# Patient Record
Sex: FEMALE | Race: BLACK OR AFRICAN AMERICAN | NOT HISPANIC OR LATINO
[De-identification: names, ages, dates, MRNs, and addresses within clinical notes are randomized per-mention and may not be internally consistent; named-entity substitution may affect disease eponyms.]

---

## 2020-11-04 PROBLEM — Z00.00 ENCOUNTER FOR PREVENTIVE HEALTH EXAMINATION: Status: ACTIVE | Noted: 2020-11-04

## 2020-11-20 ENCOUNTER — APPOINTMENT (OUTPATIENT)
Dept: VASCULAR SURGERY | Facility: CLINIC | Age: 70
End: 2020-11-20
Payer: MEDICARE

## 2020-11-20 ENCOUNTER — APPOINTMENT (OUTPATIENT)
Dept: VASCULAR SURGERY | Facility: CLINIC | Age: 70
End: 2020-11-20

## 2020-11-20 VITALS
BODY MASS INDEX: 40.48 KG/M2 | SYSTOLIC BLOOD PRESSURE: 170 MMHG | HEIGHT: 62 IN | WEIGHT: 220 LBS | HEART RATE: 90 BPM | DIASTOLIC BLOOD PRESSURE: 72 MMHG

## 2020-11-20 DIAGNOSIS — I10 ESSENTIAL (PRIMARY) HYPERTENSION: ICD-10-CM

## 2020-11-20 DIAGNOSIS — E11.9 TYPE 2 DIABETES MELLITUS W/OUT COMPLICATIONS: ICD-10-CM

## 2020-11-20 DIAGNOSIS — E04.2 NONTOXIC MULTINODULAR GOITER: ICD-10-CM

## 2020-11-20 DIAGNOSIS — E78.5 HYPERLIPIDEMIA, UNSPECIFIED: ICD-10-CM

## 2020-11-20 DIAGNOSIS — I50.9 HEART FAILURE, UNSPECIFIED: ICD-10-CM

## 2020-11-20 DIAGNOSIS — Z78.9 OTHER SPECIFIED HEALTH STATUS: ICD-10-CM

## 2020-11-20 PROCEDURE — 93970 EXTREMITY STUDY: CPT

## 2020-11-20 PROCEDURE — 99204 OFFICE O/P NEW MOD 45 MIN: CPT

## 2020-11-20 PROCEDURE — 93924 LWR XTR VASC STDY BILAT: CPT

## 2020-11-20 RX ORDER — LISINOPRIL 40 MG/1
40 TABLET ORAL
Refills: 0 | Status: ACTIVE | COMMUNITY

## 2020-11-20 RX ORDER — VITAMIN B COMPLEX
CAPSULE ORAL
Refills: 0 | Status: ACTIVE | COMMUNITY

## 2020-11-20 RX ORDER — HUMAN INSULIN 100 [IU]/ML
INJECTION, SUSPENSION SUBCUTANEOUS
Refills: 0 | Status: ACTIVE | COMMUNITY

## 2020-11-20 RX ORDER — ATORVASTATIN CALCIUM 40 MG/1
40 TABLET, FILM COATED ORAL
Refills: 0 | Status: ACTIVE | COMMUNITY

## 2020-11-20 RX ORDER — PSYLLIUM HUSK 0.4 G
CAPSULE ORAL
Refills: 0 | Status: ACTIVE | COMMUNITY

## 2020-11-20 NOTE — HISTORY OF PRESENT ILLNESS
[FreeTextEntry1] : 71 yo female with history of dm, htn, hld, chf, ckd not on hd presents for evaluation of bilateral lower extremity edema pain numbness and pain.  pt states that the pain has been present for a while but has significantly increased in severity over the past 3 months.  pt denies any history of open wounds or ulcers.  pt states that the right leg is more painful then the left.  pt states that after walking about 1/2 block her breathing stops her but her legs do feel stiff.  pt states that her dose of lasix was recently increased with improvement in the edema and pain.

## 2020-11-20 NOTE — CONSULT LETTER
[Dear  ___] : Dear  [unfilled], [Consult Letter:] : I had the pleasure of evaluating your patient, [unfilled]. [Please see my note below.] : Please see my note below. [Consult Closing:] : Thank you very much for allowing me to participate in the care of this patient.  If you have any questions, please do not hesitate to contact me. [Sincerely,] : Sincerely, [FreeTextEntry3] : Piyuhs Kidd M.D., F.YENYS., R.P.[email or web address].\par  of Vascular Surgery\par Assistant Professor of Radiology\par Director of Endovascular Program/ [facility]\par [facility]

## 2020-11-20 NOTE — PHYSICAL EXAM
[0] : right 0 [2+] : left 2+ [] : bilaterally [Ankle Swelling Bilaterally] : severe [No Rash or Lesion] : No rash or lesion [Alert] : alert [Calm] : calm [JVD] : no jugular venous distention  [Normal Breath Sounds] : Normal breath sounds [Normal Heart Sounds] : normal heart sounds [Ankle Swelling (On Exam)] : not present [Varicose Veins Of Lower Extremities] : not present [Abdomen Masses] : No abdominal masses [Skin Ulcer] : no ulcer [de-identified] : appears well  [de-identified] : right calf tenderness

## 2020-11-20 NOTE — ASSESSMENT
[FreeTextEntry1] : 69 yo female with history of dm, htn, hld, chf, ckd not on hd presents for evaluation of bilateral lower extremity edema pain numbness and pain. \par \par venous duplex shows no insufficiency of the right lower extremity, left lower extremity with reflux in the ssv from the saphenofemoral junction to the proximal calf \par \par farhana/pvr shows mild decrease in pressure of the right lower extremity with farhana of 0.8 with drop to 0.6 after exercise \par \par would recommend right lower extremity angiogram\par pt given request for blood work to check for most recent creatine and she will consult with her nephrologist \par pt encouraged to exercise/walk \par pt to start asa  \par given pt history of chf would not recommend pletal \par

## 2020-12-13 ENCOUNTER — APPOINTMENT (OUTPATIENT)
Dept: DISASTER EMERGENCY | Facility: CLINIC | Age: 70
End: 2020-12-13

## 2020-12-14 ENCOUNTER — LABORATORY RESULT (OUTPATIENT)
Age: 70
End: 2020-12-14

## 2020-12-14 LAB — SARS-COV-2 N GENE NPH QL NAA+PROBE: NOT DETECTED

## 2020-12-15 PROBLEM — I73.9 PAD (PERIPHERAL ARTERY DISEASE): Status: ACTIVE | Noted: 2020-11-20

## 2020-12-15 RX ORDER — INSULIN ISOPHANE,PORK PURE 100/ML
VIAL (ML) SUBCUTANEOUS
Refills: 0 | Status: ACTIVE | COMMUNITY

## 2020-12-15 RX ORDER — GABAPENTIN 300 MG/1
300 CAPSULE ORAL
Refills: 0 | Status: ACTIVE | COMMUNITY

## 2020-12-16 ENCOUNTER — APPOINTMENT (OUTPATIENT)
Dept: ENDOVASCULAR SURGERY | Facility: CLINIC | Age: 70
End: 2020-12-16
Payer: MEDICARE

## 2020-12-16 ENCOUNTER — LABORATORY RESULT (OUTPATIENT)
Age: 70
End: 2020-12-16

## 2020-12-16 ENCOUNTER — RESULT REVIEW (OUTPATIENT)
Age: 70
End: 2020-12-16

## 2020-12-16 VITALS
DIASTOLIC BLOOD PRESSURE: 97 MMHG | RESPIRATION RATE: 16 BRPM | OXYGEN SATURATION: 98 % | SYSTOLIC BLOOD PRESSURE: 185 MMHG | HEART RATE: 74 BPM | TEMPERATURE: 98 F | WEIGHT: 220 LBS | BODY MASS INDEX: 40.48 KG/M2 | HEIGHT: 62 IN

## 2020-12-16 DIAGNOSIS — I73.9 PERIPHERAL VASCULAR DISEASE, UNSPECIFIED: ICD-10-CM

## 2020-12-16 PROCEDURE — 99072 ADDL SUPL MATRL&STAF TM PHE: CPT

## 2020-12-16 PROCEDURE — 37229Z: CUSTOM | Mod: RT

## 2020-12-16 PROCEDURE — 75625 CONTRAST EXAM ABDOMINL AORTA: CPT

## 2020-12-16 RX ORDER — ASPIRIN ENTERIC COATED TABLETS 81 MG 81 MG/1
81 TABLET, DELAYED RELEASE ORAL DAILY
Qty: 90 | Refills: 3 | Status: ACTIVE | COMMUNITY
Start: 2020-12-16 | End: 1900-01-01

## 2020-12-22 NOTE — PAST MEDICAL HISTORY
[Increasing age ( >40 years old)] : Increasing age ( >40 years old) [Congestive Heart Failure] : Congestive Heart Failure [No therapy indicated for cases scheduled for less than one hour] : No therapy indicated for cases scheduled for less than one hour. [Obesity: BMI >25] : Obesity: BMI >25 [FreeTextEntry1] : Malignant Hyperthermia Screening Tool and Risk of Bleeding Assessment\par \par Ms. YON WARREN denies family history of unexpected death following Anesthesia or Exercise.\par Denies Family history of Malignant Hyperthermia, Muscle or Neuromuscular disorder and High Temperature following exercise.\par \par Ms. YON WARREN denies history of Muscle Spasm, Dark or Chocolate - Colored urine and Unanticipated fever immediately following anesthesia or serious exercise. \par Ms. WARREN also denies bleeding tendencies/ Risks of Bleeding.\par

## 2020-12-22 NOTE — PROCEDURE
[FSBS] : FSBS [D/C IV on discharge] : D/C IV on discharge [Resume diet] : resume diet [FreeTextEntry1] : aortogram, right leg angiogram/atherectomy/angioplasty

## 2020-12-22 NOTE — HISTORY OF PRESENT ILLNESS
[FreeTextEntry1] : accompanied by daughter Tootie Estevez [phone]\par covid not detected 12/13/2020\par Cr 2.03 12/14/2020\par BS: 250, took 10 units of novolin at 430 am.  @700am\par feels ok\par took lisinopril [FreeTextEntry5] : 12/15/2020 9pm [FreeTextEntry6] : Dr. Sparks

## 2020-12-22 NOTE — PHYSICAL EXAM
[Normal Breath Sounds] : Normal breath sounds [Normal Heart Sounds] : normal heart sounds [0] : right 0 [2+] : left 2+ [] : bilaterally [Ankle Swelling Bilaterally] : severe [No Rash or Lesion] : No rash or lesion [Alert] : alert [Calm] : calm [JVD] : no jugular venous distention  [Ankle Swelling (On Exam)] : not present [Varicose Veins Of Lower Extremities] : not present [Abdomen Masses] : No abdominal masses [Skin Ulcer] : no ulcer [de-identified] : appears well  [de-identified] : right calf tenderness

## 2021-01-08 ENCOUNTER — APPOINTMENT (OUTPATIENT)
Dept: VASCULAR SURGERY | Facility: CLINIC | Age: 71
End: 2021-01-08
Payer: MEDICARE

## 2021-01-08 VITALS — TEMPERATURE: 96.8 F

## 2021-01-08 PROCEDURE — 99213 OFFICE O/P EST LOW 20 MIN: CPT

## 2021-01-08 PROCEDURE — 93926 LOWER EXTREMITY STUDY: CPT

## 2021-01-08 PROCEDURE — 99072 ADDL SUPL MATRL&STAF TM PHE: CPT

## 2021-01-08 PROCEDURE — 93923 UPR/LXTR ART STDY 3+ LVLS: CPT

## 2021-01-08 NOTE — PHYSICAL EXAM
Currently asymptomatic  [JVD] : no jugular venous distention  [Normal Breath Sounds] : Normal breath sounds [Normal Heart Sounds] : normal heart sounds [2+] : left 2+ [Ankle Swelling (On Exam)] : not present [Varicose Veins Of Lower Extremities] : not present [] : bilaterally [Ankle Swelling Bilaterally] : severe [Abdomen Masses] : No abdominal masses [No Rash or Lesion] : No rash or lesion [Skin Ulcer] : no ulcer [Alert] : alert [Oriented to Person] : oriented to person [Oriented to Place] : oriented to place [Calm] : calm [de-identified] : appears well  [de-identified] : right calf tenderness

## 2021-01-08 NOTE — ASSESSMENT
[FreeTextEntry1] : Patient with peripheral vascular disease, status post right leg intervention.  Patient's symptoms have improved.  Arterial Dopplers have improved.  Arterial duplex of the right anterior tibial artery shows some residual stenosis.  Recommend antiplatelet therapy.  Follow-up in 3 months with repeat duplex.

## 2021-01-08 NOTE — HISTORY OF PRESENT ILLNESS
[FreeTextEntry1] : Patient is a 70-year-old with complaints of right foot pain and claudication with skin discoloration who underwent right leg angiogram and right lower extremity revascularization endovascularly.  Patient reports improvement of symptoms significantly.  No significant rest pain with mild claudication symptoms.  Patient has remained noncompliant with antiplatelet therapy.

## 2021-04-16 ENCOUNTER — APPOINTMENT (OUTPATIENT)
Dept: VASCULAR SURGERY | Facility: CLINIC | Age: 71
End: 2021-04-16
Payer: MEDICARE

## 2021-04-16 VITALS
DIASTOLIC BLOOD PRESSURE: 74 MMHG | SYSTOLIC BLOOD PRESSURE: 126 MMHG | WEIGHT: 220 LBS | HEIGHT: 62 IN | BODY MASS INDEX: 40.48 KG/M2 | HEART RATE: 66 BPM

## 2021-04-16 VITALS — TEMPERATURE: 96.7 F

## 2021-04-16 PROCEDURE — 99213 OFFICE O/P EST LOW 20 MIN: CPT

## 2021-04-16 PROCEDURE — 93926 LOWER EXTREMITY STUDY: CPT

## 2021-04-16 PROCEDURE — 99072 ADDL SUPL MATRL&STAF TM PHE: CPT

## 2021-04-16 NOTE — HISTORY OF PRESENT ILLNESS
[FreeTextEntry1] : Patient is a 70-year-old with complaints of right foot pain and claudication with skin discoloration who underwent right leg angiogram and right lower extremity revascularization endovascularly.  Patient reports improvement of symptoms significantly.  No significant rest pain with mild claudication symptoms.

## 2021-04-16 NOTE — ASSESSMENT
[FreeTextEntry1] : Patient with peripheral vascular disease, status post right leg intervention.  Patient's symptoms have improved.   Arterial duplex of the right anterior tibial artery shows some residual stenosis.  Recommend antiplatelet therapy.  Follow-up in 3 months with repeat duplex and PVRs.

## 2021-04-16 NOTE — PHYSICAL EXAM
[JVD] : no jugular venous distention  [Normal Breath Sounds] : Normal breath sounds [Normal Heart Sounds] : normal heart sounds [2+] : left 2+ [Ankle Swelling (On Exam)] : not present [Varicose Veins Of Lower Extremities] : not present [] : bilaterally [Ankle Swelling Bilaterally] : severe [Abdomen Masses] : No abdominal masses [No Rash or Lesion] : No rash or lesion [Skin Ulcer] : no ulcer [Alert] : alert [Oriented to Person] : oriented to person [Oriented to Place] : oriented to place [Calm] : calm [de-identified] : appears well  [de-identified] : right calf tenderness

## 2021-04-29 ENCOUNTER — INPATIENT (INPATIENT)
Facility: HOSPITAL | Age: 71
LOS: 22 days | Discharge: HOME HEALTH | End: 2021-05-22
Attending: HOSPITALIST | Admitting: HOSPITALIST
Payer: MEDICARE

## 2021-04-29 VITALS
TEMPERATURE: 98 F | SYSTOLIC BLOOD PRESSURE: 169 MMHG | DIASTOLIC BLOOD PRESSURE: 82 MMHG | OXYGEN SATURATION: 95 % | RESPIRATION RATE: 24 BRPM | HEART RATE: 74 BPM

## 2021-04-29 DIAGNOSIS — I10 ESSENTIAL (PRIMARY) HYPERTENSION: ICD-10-CM

## 2021-04-29 DIAGNOSIS — I50.9 HEART FAILURE, UNSPECIFIED: ICD-10-CM

## 2021-04-29 DIAGNOSIS — C34.90 MALIGNANT NEOPLASM OF UNSPECIFIED PART OF UNSPECIFIED BRONCHUS OR LUNG: Chronic | ICD-10-CM

## 2021-04-29 DIAGNOSIS — G62.9 POLYNEUROPATHY, UNSPECIFIED: ICD-10-CM

## 2021-04-29 DIAGNOSIS — N18.9 CHRONIC KIDNEY DISEASE, UNSPECIFIED: ICD-10-CM

## 2021-04-29 DIAGNOSIS — Z29.9 ENCOUNTER FOR PROPHYLACTIC MEASURES, UNSPECIFIED: ICD-10-CM

## 2021-04-29 DIAGNOSIS — R06.00 DYSPNEA, UNSPECIFIED: ICD-10-CM

## 2021-04-29 DIAGNOSIS — I73.9 PERIPHERAL VASCULAR DISEASE, UNSPECIFIED: ICD-10-CM

## 2021-04-29 DIAGNOSIS — Z98.890 OTHER SPECIFIED POSTPROCEDURAL STATES: Chronic | ICD-10-CM

## 2021-04-29 DIAGNOSIS — F10.10 ALCOHOL ABUSE, UNCOMPLICATED: ICD-10-CM

## 2021-04-29 DIAGNOSIS — N28.9 DISORDER OF KIDNEY AND URETER, UNSPECIFIED: ICD-10-CM

## 2021-04-29 DIAGNOSIS — E78.5 HYPERLIPIDEMIA, UNSPECIFIED: ICD-10-CM

## 2021-04-29 DIAGNOSIS — Z90.710 ACQUIRED ABSENCE OF BOTH CERVIX AND UTERUS: Chronic | ICD-10-CM

## 2021-04-29 DIAGNOSIS — E11.9 TYPE 2 DIABETES MELLITUS WITHOUT COMPLICATIONS: ICD-10-CM

## 2021-04-29 DIAGNOSIS — H53.2 DIPLOPIA: ICD-10-CM

## 2021-04-29 DIAGNOSIS — Z98.62 PERIPHERAL VASCULAR ANGIOPLASTY STATUS: Chronic | ICD-10-CM

## 2021-04-29 DIAGNOSIS — R07.9 CHEST PAIN, UNSPECIFIED: ICD-10-CM

## 2021-04-29 DIAGNOSIS — Z98.49 CATARACT EXTRACTION STATUS, UNSPECIFIED EYE: Chronic | ICD-10-CM

## 2021-04-29 DIAGNOSIS — R13.10 DYSPHAGIA, UNSPECIFIED: ICD-10-CM

## 2021-04-29 DIAGNOSIS — E11.42 TYPE 2 DIABETES MELLITUS WITH DIABETIC POLYNEUROPATHY: ICD-10-CM

## 2021-04-29 LAB
ALBUMIN SERPL ELPH-MCNC: 4.2 G/DL — SIGNIFICANT CHANGE UP (ref 3.3–5)
ALP SERPL-CCNC: 122 U/L — HIGH (ref 40–120)
ALT FLD-CCNC: 25 U/L — SIGNIFICANT CHANGE UP (ref 4–33)
ANION GAP SERPL CALC-SCNC: 10 MMOL/L — SIGNIFICANT CHANGE UP (ref 7–14)
AST SERPL-CCNC: 23 U/L — SIGNIFICANT CHANGE UP (ref 4–32)
BASOPHILS # BLD AUTO: 0.01 K/UL — SIGNIFICANT CHANGE UP (ref 0–0.2)
BASOPHILS NFR BLD AUTO: 0.2 % — SIGNIFICANT CHANGE UP (ref 0–2)
BILIRUB SERPL-MCNC: 0.4 MG/DL — SIGNIFICANT CHANGE UP (ref 0.2–1.2)
BUN SERPL-MCNC: 44 MG/DL — HIGH (ref 7–23)
CALCIUM SERPL-MCNC: 10.6 MG/DL — HIGH (ref 8.4–10.5)
CHLORIDE SERPL-SCNC: 105 MMOL/L — SIGNIFICANT CHANGE UP (ref 98–107)
CK MB BLD-MCNC: 1.8 % — SIGNIFICANT CHANGE UP (ref 0–2.5)
CK MB BLD-MCNC: 1.9 % — SIGNIFICANT CHANGE UP (ref 0–2.5)
CK MB CFR SERPL CALC: 2.9 NG/ML — SIGNIFICANT CHANGE UP
CK MB CFR SERPL CALC: 3 NG/ML — SIGNIFICANT CHANGE UP
CK SERPL-CCNC: 161 U/L — SIGNIFICANT CHANGE UP (ref 25–170)
CK SERPL-CCNC: 162 U/L — SIGNIFICANT CHANGE UP (ref 25–170)
CO2 SERPL-SCNC: 26 MMOL/L — SIGNIFICANT CHANGE UP (ref 22–31)
CREAT SERPL-MCNC: 2.41 MG/DL — HIGH (ref 0.5–1.3)
EOSINOPHIL # BLD AUTO: 0.09 K/UL — SIGNIFICANT CHANGE UP (ref 0–0.5)
EOSINOPHIL NFR BLD AUTO: 1.9 % — SIGNIFICANT CHANGE UP (ref 0–6)
GLUCOSE BLDC GLUCOMTR-MCNC: 140 MG/DL — HIGH (ref 70–99)
GLUCOSE BLDC GLUCOMTR-MCNC: 140 MG/DL — HIGH (ref 70–99)
GLUCOSE BLDC GLUCOMTR-MCNC: 87 MG/DL — SIGNIFICANT CHANGE UP (ref 70–99)
GLUCOSE SERPL-MCNC: 74 MG/DL — SIGNIFICANT CHANGE UP (ref 70–99)
HCT VFR BLD CALC: 34.8 % — SIGNIFICANT CHANGE UP (ref 34.5–45)
HGB BLD-MCNC: 10.9 G/DL — LOW (ref 11.5–15.5)
IANC: 2.74 K/UL — SIGNIFICANT CHANGE UP (ref 1.5–8.5)
IMM GRANULOCYTES NFR BLD AUTO: 0.2 % — SIGNIFICANT CHANGE UP (ref 0–1.5)
LYMPHOCYTES # BLD AUTO: 1.25 K/UL — SIGNIFICANT CHANGE UP (ref 1–3.3)
LYMPHOCYTES # BLD AUTO: 26.6 % — SIGNIFICANT CHANGE UP (ref 13–44)
MAGNESIUM SERPL-MCNC: 2.5 MG/DL — SIGNIFICANT CHANGE UP (ref 1.6–2.6)
MCHC RBC-ENTMCNC: 28.7 PG — SIGNIFICANT CHANGE UP (ref 27–34)
MCHC RBC-ENTMCNC: 31.3 GM/DL — LOW (ref 32–36)
MCV RBC AUTO: 91.6 FL — SIGNIFICANT CHANGE UP (ref 80–100)
MONOCYTES # BLD AUTO: 0.6 K/UL — SIGNIFICANT CHANGE UP (ref 0–0.9)
MONOCYTES NFR BLD AUTO: 12.8 % — SIGNIFICANT CHANGE UP (ref 2–14)
NEUTROPHILS # BLD AUTO: 2.74 K/UL — SIGNIFICANT CHANGE UP (ref 1.8–7.4)
NEUTROPHILS NFR BLD AUTO: 58.3 % — SIGNIFICANT CHANGE UP (ref 43–77)
NRBC # BLD: 0 /100 WBCS — SIGNIFICANT CHANGE UP
NRBC # FLD: 0 K/UL — SIGNIFICANT CHANGE UP
NT-PROBNP SERPL-SCNC: 295 PG/ML — SIGNIFICANT CHANGE UP
PHOSPHATE SERPL-MCNC: 3.4 MG/DL — SIGNIFICANT CHANGE UP (ref 2.5–4.5)
PLATELET # BLD AUTO: 198 K/UL — SIGNIFICANT CHANGE UP (ref 150–400)
POTASSIUM SERPL-MCNC: 4.8 MMOL/L — SIGNIFICANT CHANGE UP (ref 3.5–5.3)
POTASSIUM SERPL-SCNC: 4.8 MMOL/L — SIGNIFICANT CHANGE UP (ref 3.5–5.3)
PROT SERPL-MCNC: 7.3 G/DL — SIGNIFICANT CHANGE UP (ref 6–8.3)
RBC # BLD: 3.8 M/UL — SIGNIFICANT CHANGE UP (ref 3.8–5.2)
RBC # FLD: 15 % — HIGH (ref 10.3–14.5)
SARS-COV-2 RNA SPEC QL NAA+PROBE: SIGNIFICANT CHANGE UP
SODIUM SERPL-SCNC: 141 MMOL/L — SIGNIFICANT CHANGE UP (ref 135–145)
TROPONIN T, HIGH SENSITIVITY RESULT: 64 NG/L — CRITICAL HIGH
TROPONIN T, HIGH SENSITIVITY RESULT: 66 NG/L — CRITICAL HIGH
WBC # BLD: 4.7 K/UL — SIGNIFICANT CHANGE UP (ref 3.8–10.5)
WBC # FLD AUTO: 4.7 K/UL — SIGNIFICANT CHANGE UP (ref 3.8–10.5)

## 2021-04-29 PROCEDURE — 71045 X-RAY EXAM CHEST 1 VIEW: CPT | Mod: 26

## 2021-04-29 PROCEDURE — 99223 1ST HOSP IP/OBS HIGH 75: CPT

## 2021-04-29 PROCEDURE — 99285 EMERGENCY DEPT VISIT HI MDM: CPT | Mod: 25

## 2021-04-29 PROCEDURE — 93010 ELECTROCARDIOGRAM REPORT: CPT

## 2021-04-29 RX ORDER — ASPIRIN/CALCIUM CARB/MAGNESIUM 324 MG
324 TABLET ORAL ONCE
Refills: 0 | Status: COMPLETED | OUTPATIENT
Start: 2021-04-29 | End: 2021-04-29

## 2021-04-29 RX ORDER — FUROSEMIDE 40 MG
40 TABLET ORAL ONCE
Refills: 0 | Status: COMPLETED | OUTPATIENT
Start: 2021-04-29 | End: 2021-04-29

## 2021-04-29 RX ORDER — GABAPENTIN 400 MG/1
300 CAPSULE ORAL THREE TIMES A DAY
Refills: 0 | Status: DISCONTINUED | OUTPATIENT
Start: 2021-04-29 | End: 2021-05-22

## 2021-04-29 RX ORDER — KETOTIFEN FUMARATE 0.34 MG/ML
1 SOLUTION OPHTHALMIC
Refills: 0 | Status: DISCONTINUED | OUTPATIENT
Start: 2021-04-29 | End: 2021-05-22

## 2021-04-29 RX ORDER — INSULIN LISPRO 100/ML
5 VIAL (ML) SUBCUTANEOUS
Refills: 0 | Status: DISCONTINUED | OUTPATIENT
Start: 2021-04-29 | End: 2021-04-29

## 2021-04-29 RX ORDER — DEXTROSE 50 % IN WATER 50 %
25 SYRINGE (ML) INTRAVENOUS ONCE
Refills: 0 | Status: DISCONTINUED | OUTPATIENT
Start: 2021-04-29 | End: 2021-05-22

## 2021-04-29 RX ORDER — ALLOPURINOL 300 MG
100 TABLET ORAL DAILY
Refills: 0 | Status: DISCONTINUED | OUTPATIENT
Start: 2021-04-29 | End: 2021-05-22

## 2021-04-29 RX ORDER — KETOTIFEN FUMARATE 0.34 MG/ML
1 SOLUTION OPHTHALMIC
Refills: 0 | Status: DISCONTINUED | OUTPATIENT
Start: 2021-04-29 | End: 2021-04-29

## 2021-04-29 RX ORDER — SODIUM CHLORIDE 9 MG/ML
1000 INJECTION, SOLUTION INTRAVENOUS
Refills: 0 | Status: DISCONTINUED | OUTPATIENT
Start: 2021-04-29 | End: 2021-05-22

## 2021-04-29 RX ORDER — HYDRALAZINE HCL 50 MG
100 TABLET ORAL THREE TIMES A DAY
Refills: 0 | Status: DISCONTINUED | OUTPATIENT
Start: 2021-04-29 | End: 2021-05-04

## 2021-04-29 RX ORDER — GLUCAGON INJECTION, SOLUTION 0.5 MG/.1ML
1 INJECTION, SOLUTION SUBCUTANEOUS ONCE
Refills: 0 | Status: DISCONTINUED | OUTPATIENT
Start: 2021-04-29 | End: 2021-05-22

## 2021-04-29 RX ORDER — INSULIN LISPRO 100/ML
VIAL (ML) SUBCUTANEOUS AT BEDTIME
Refills: 0 | Status: DISCONTINUED | OUTPATIENT
Start: 2021-04-29 | End: 2021-05-10

## 2021-04-29 RX ORDER — DEXTROSE 50 % IN WATER 50 %
12.5 SYRINGE (ML) INTRAVENOUS ONCE
Refills: 0 | Status: DISCONTINUED | OUTPATIENT
Start: 2021-04-29 | End: 2021-05-22

## 2021-04-29 RX ORDER — ACETAMINOPHEN 500 MG
650 TABLET ORAL EVERY 6 HOURS
Refills: 0 | Status: DISCONTINUED | OUTPATIENT
Start: 2021-04-29 | End: 2021-05-22

## 2021-04-29 RX ORDER — HEPARIN SODIUM 5000 [USP'U]/ML
5000 INJECTION INTRAVENOUS; SUBCUTANEOUS EVERY 8 HOURS
Refills: 0 | Status: DISCONTINUED | OUTPATIENT
Start: 2021-04-29 | End: 2021-05-20

## 2021-04-29 RX ORDER — INSULIN LISPRO 100/ML
VIAL (ML) SUBCUTANEOUS
Refills: 0 | Status: DISCONTINUED | OUTPATIENT
Start: 2021-04-29 | End: 2021-05-10

## 2021-04-29 RX ORDER — CARVEDILOL PHOSPHATE 80 MG/1
6.25 CAPSULE, EXTENDED RELEASE ORAL EVERY 12 HOURS
Refills: 0 | Status: DISCONTINUED | OUTPATIENT
Start: 2021-04-29 | End: 2021-05-04

## 2021-04-29 RX ORDER — HUMAN INSULIN 100 [IU]/ML
25 INJECTION, SUSPENSION SUBCUTANEOUS
Refills: 0 | Status: DISCONTINUED | OUTPATIENT
Start: 2021-04-29 | End: 2021-04-29

## 2021-04-29 RX ORDER — DEXTROSE 50 % IN WATER 50 %
15 SYRINGE (ML) INTRAVENOUS ONCE
Refills: 0 | Status: DISCONTINUED | OUTPATIENT
Start: 2021-04-29 | End: 2021-05-22

## 2021-04-29 RX ORDER — HUMAN INSULIN 100 [IU]/ML
20 INJECTION, SUSPENSION SUBCUTANEOUS
Refills: 0 | Status: DISCONTINUED | OUTPATIENT
Start: 2021-04-29 | End: 2021-05-07

## 2021-04-29 RX ORDER — FUROSEMIDE 40 MG
40 TABLET ORAL
Refills: 0 | Status: DISCONTINUED | OUTPATIENT
Start: 2021-04-29 | End: 2021-04-30

## 2021-04-29 RX ORDER — LISINOPRIL 2.5 MG/1
40 TABLET ORAL DAILY
Refills: 0 | Status: DISCONTINUED | OUTPATIENT
Start: 2021-04-29 | End: 2021-04-29

## 2021-04-29 RX ORDER — ATORVASTATIN CALCIUM 80 MG/1
80 TABLET, FILM COATED ORAL AT BEDTIME
Refills: 0 | Status: DISCONTINUED | OUTPATIENT
Start: 2021-04-29 | End: 2021-05-22

## 2021-04-29 RX ORDER — LISINOPRIL 2.5 MG/1
1 TABLET ORAL
Qty: 0 | Refills: 0 | DISCHARGE

## 2021-04-29 RX ORDER — ASPIRIN/CALCIUM CARB/MAGNESIUM 324 MG
81 TABLET ORAL DAILY
Refills: 0 | Status: DISCONTINUED | OUTPATIENT
Start: 2021-04-29 | End: 2021-05-22

## 2021-04-29 RX ADMIN — Medication 324 MILLIGRAM(S): at 11:28

## 2021-04-29 RX ADMIN — GABAPENTIN 300 MILLIGRAM(S): 400 CAPSULE ORAL at 22:46

## 2021-04-29 RX ADMIN — ATORVASTATIN CALCIUM 80 MILLIGRAM(S): 80 TABLET, FILM COATED ORAL at 22:47

## 2021-04-29 RX ADMIN — Medication 1 TABLET(S): at 17:39

## 2021-04-29 RX ADMIN — Medication 100 MILLIGRAM(S): at 19:59

## 2021-04-29 RX ADMIN — LISINOPRIL 40 MILLIGRAM(S): 2.5 TABLET ORAL at 19:59

## 2021-04-29 RX ADMIN — Medication 40 MILLIGRAM(S): at 17:40

## 2021-04-29 RX ADMIN — HEPARIN SODIUM 5000 UNIT(S): 5000 INJECTION INTRAVENOUS; SUBCUTANEOUS at 22:46

## 2021-04-29 RX ADMIN — Medication 40 MILLIGRAM(S): at 10:12

## 2021-04-29 RX ADMIN — Medication 81 MILLIGRAM(S): at 17:38

## 2021-04-29 RX ADMIN — CARVEDILOL PHOSPHATE 6.25 MILLIGRAM(S): 80 CAPSULE, EXTENDED RELEASE ORAL at 17:38

## 2021-04-29 NOTE — H&P ADULT - NSICDXPASTMEDICALHX_GEN_ALL_CORE_FT
PAST MEDICAL HISTORY:  CHF (congestive heart failure)     CKD (chronic kidney disease)     Diabetes     HLD (hyperlipidemia)     HTN (hypertension)     Peripheral neuropathy     PVD (peripheral vascular disease)

## 2021-04-29 NOTE — H&P ADULT - GASTROINTESTINAL DETAILS
soft/nontender/no rebound tenderness/no guarding soft/nontender/no rebound tenderness/no guarding/no rigidity

## 2021-04-29 NOTE — H&P ADULT - PROBLEM SELECTOR PLAN 3
monitor bun/cr, avoid nephrotoxic agents, consider renal consult - Patient reports worsening renal function as outpatient but unsure on function/level (states it was in the 2s, now 1s but not sure if that is the creatinine level or the function level)  - Here with elevated BUN/Cr placing her at CKD4  - Would monitor BUN/Cr, check UA, urine sodium/creatinine  - Check renal US  - Consider renal eval in AM

## 2021-04-29 NOTE — ED PROVIDER NOTE - PMH
CHF (congestive heart failure)    CKD (chronic kidney disease)    Diabetes    HLD (hyperlipidemia)    HTN (hypertension)    PVD (peripheral vascular disease)

## 2021-04-29 NOTE — H&P ADULT - NEGATIVE ENMT SYMPTOMS
no vertigo/no sinus symptoms/no nasal congestion/no nasal discharge/no nasal obstruction no vertigo/no sinus symptoms/no nasal congestion/no nasal discharge/no nasal obstruction/no throat pain

## 2021-04-29 NOTE — ED PROVIDER NOTE - NS ED ROS FT
Constitutional: no fevers; no chills  HEENT: no visual changes, no sore throat, no rhinorrhea  CV: cp; no palpitations  Resp: sob; no cough  GI: no abd pain, no nausea, no vomiting, no diarrhea, no constipation  : no dysuria, no hematuria  MSK: no myalgais; no arthralgias  skin: no rashes  neuro: no HA, no numbness; no weakness, no tingling  ROS statement: all other ROS negative except as per HPI

## 2021-04-29 NOTE — H&P ADULT - MUSCULOSKELETAL
normal/ROM intact/no calf tenderness/normal strength detailed exam details… ROM intact/no joint swelling/no calf tenderness/normal strength

## 2021-04-29 NOTE — H&P ADULT - PROBLEM SELECTOR PLAN 5
monitor bp, cont meds, adjust as needed - c/o new onset dysphagia, noted over the past few weeks, mostly to large pills, occasionally to food  - here passed dysphagia screen, will place on dysphagia 3 diet with honey thick liquids, obtain S&S eval in AM  - Check Thyroid US to eval her thyroid nodules, if present then might need further evaluation

## 2021-04-29 NOTE — H&P ADULT - PROBLEM SELECTOR PROBLEM 7
Need for prophylactic measure Type 2 diabetes mellitus with diabetic polyneuropathy, with long-term current use of insulin

## 2021-04-29 NOTE — H&P ADULT - NSICDXPASTSURGICALHX_GEN_ALL_CORE_FT
PAST SURGICAL HISTORY:  History of angioplasty of peripheral vessel RLE - no stents    History of breast biopsy bilateral - benign    History of cataract surgery bilateral eyes    History of hysterectomy     S/P thyroid biopsy benign

## 2021-04-29 NOTE — H&P ADULT - NEGATIVE GASTROINTESTINAL SYMPTOMS
no nausea/no vomiting/no change in bowel habits/no abdominal pain no nausea/no vomiting/no diarrhea/no change in bowel habits/no abdominal pain

## 2021-04-29 NOTE — H&P ADULT - PROBLEM SELECTOR PLAN 1
ekg/telemetry, ce x 2, mg, tsh, echo, consider cardio c/s, lasix 40 iv bid, daily wts, fluid restriction, strict I & O's, bnp - Patient with worsening SOB, orthopnea, b/l LE edema, weight gain, increasing abdominal girth, here with bibasilar crackles and mildly elevated JVD concerning for CHF exacerbation despite low proBNP (might be falsely low 2/2 BMI)  - Will c/w IV lasix diuresis BID, check TTE, telemetry monitoring  - 1.5L fluid restriction, daily weights, I/O   - Recheck proBNP in AM, trend trops (likely elevated 2/2 CKD and demand from CHF exacerbation)  - Cardiology eval in AM as per primary team  - Will check b/l LE duplex scan

## 2021-04-29 NOTE — ED PROVIDER NOTE - ATTENDING CONTRIBUTION TO CARE
Pt was seen and evaluated by me. Pt is a 70 y/o female with PMHx CKD, DM type 2, HLD, HTN, PVD, and CHF who presented to the ED for increased SOB X 5 days. Pt states over the past 5 days having increased SOB with lying down and improved with sitting up. Pt was recently changed from Lasix 40mg to Torsemide 20mg. Pt also admits to some chest discomfort. Pt denies any fever, chills, nausea, vomiting, or abd pain. Rales at b/l bases. RRR. Abd soft, non-tender. Noted to have 2+ pitting edema to b/l LE.   Concern for CHF/ACS  Labs, EKG, CXR, Lasix

## 2021-04-29 NOTE — H&P ADULT - HISTORY OF PRESENT ILLNESS
72 y/o female, with a PmHx of HTN, DM, CHF, CKD, HLD. PAD s/p revascularization, presented to the [facility] ED with sob. Pt states she has been having worsening sob over the past 2 days since she was taken off the Torsemide. Her Cardiologist had taken her off the Torsemide for a few days as recommended by her Nephrologist due to a worsening renal function but since coming off of the Torsemide, she has gradually gotten more sob. She states she is having difficulty ambulating a few feet without getting sob and has had worsening pain in bilateral LE's due to increased edema. Denies any fever, chills, HA, dizziness, abd pain, n/v, recent travel. She did have some chest pain this morning to the left sided of her chest. She described the chest pain as a dull, left sided, non-radiating, 4/10 pain but has since subsided and is now a 0/10 since she has been at [facility]. She was also c/o mild blurry vision that has gradually getting worse over the past few weeks. She states she currently feels a little better and is now being admitted to telemetry for acute on chronic systolic heart failure exacerbation.   This is a 71F with history of DM2 with neuropathy, HTN, Recently diagnosed CKD, PAD s/p angiogram RLE (pt denies any stents, just "clearing out"), CKD, HLD, and Thyroid nodules (benign biopsy x2 as per patient) who presents to the hospital with complaints of worsening SOB, LE edema, orthopnea, weight gain, and increasing abdominal girth. Also with complaints of new intermittent double vision (occasionally when watching TV, does not occur otherwise) and new dysphagia intermittently.     Patient said that she initially noted worsening LE edema a few months ago and had a work up done for it and was told she had CHF (not sure on the etiology of her CHF). Was placed on furosemide by her nephrologist and had the medication changed to torsemide by her cardiologist. Initially was taking it twice daily but then her renal function started to worsen and then her cardiologist decreased the torsemide to daily then every other day. During this time she also noted that her SOB started to worsen and that she was gaining weight (~13 pounds over the past month 215 -> 228), had worsening LE edema, increasing abdominal girth, and worsening orthopnea (now sleeps on 4 pillows). Her cardiologist had performed a stress test and an echo on her recently also as work up of her issues. (pt does not know the results). Today, while she was getting ready she noted mild, 4/10 L chest pressure like pain and had to rest for the pain to resolve. Thus came to the hospital for evaluation. Currently said that her symptoms seem improved after the medications in the ED, denies any current chest pain while in the hospital, still has SOB with exertion though.    Of note, states that she has noted occasional double vision when watching TV, does not notice the double vision with any other activities. Denies any HA with the double vision, no blurry vision, no other eye complaints. Also has noted intermittent dysphagia recently, mostly to large pills and occasionally to some food/liquids. Started about a month ago, states that she has a history of thyroid nodules and is worried that they maybe growing and causing her symptoms currently. Also has EtOH use disorder, said that she drinks a pint of vodka over the weekend every week for a long time. Has never withdrawn from EtOH use and denies any hospitalizations for EtOH use. has been to rehab x2 for EtOH use.    Denies any other complaints.     On arrival to the ED, her vitals were T 97.8, P 74, /72, RR 24, O2 sat 95% RA. Her lab work showed anemia (no prior baseline), elevated trops but stable (possibly 2/2 renal disease, CK/CKMB wnl), and elevated BUN/Cr. Her CXR showed clear lungs. She was given aspirin 324mg PO x1 and lasix 40mg IVP x1. She was admitted to medicine on telemetry.  This is a 71F with history of DM2 with neuropathy, HTN, Recently diagnosed CKD, PAD s/p angiogram RLE (pt denies any stents, just "clearing out"), CKD, HLD, and Thyroid nodules (benign biopsy x2 as per patient) who presents to the hospital with complaints of worsening SOB, LE edema, orthopnea, weight gain, and increasing abdominal girth. Also with complaints of new intermittent double vision (occasionally when watching TV, does not occur otherwise) and new dysphagia intermittently.     Patient said that she initially noted worsening LE edema a few months ago and had a work up done for it and was told she had CHF (not sure on the etiology of her CHF). Was placed on furosemide by her nephrologist and had the medication changed to torsemide by her cardiologist. Initially was taking it twice daily but then her renal function started to worsen and then her cardiologist decreased the torsemide to daily then every other day. Cardiologist also stopped her lisinopril due to her worsening renal function and placed her on amlodipine (but she has not started the medication yet). During this time she also noted that her SOB started to worsen and that she was gaining weight (~13 pounds over the past month 215 -> 228), had worsening LE edema, increasing abdominal girth, and worsening orthopnea (now sleeps on 4 pillows). Her cardiologist had performed a stress test and an echo on her recently also as work up of her issues. (pt does not know the results). Today, while she was getting ready she noted mild, 4/10 L chest pressure like pain and had to rest for the pain to resolve. Thus came to the hospital for evaluation. Currently said that her symptoms seem improved after the medications in the ED, denies any current chest pain while in the hospital, still has SOB with exertion though.    Of note, states that she has noted occasional double vision when watching TV, does not notice the double vision with any other activities. Denies any HA with the double vision, no blurry vision, no other eye complaints. Also has noted intermittent dysphagia recently, mostly to large pills and occasionally to some food/liquids. Started about a month ago, states that she has a history of thyroid nodules and is worried that they maybe growing and causing her symptoms currently. Also has EtOH use disorder, said that she drinks a pint of vodka over the weekend every week for a long time. Has never withdrawn from EtOH use and denies any hospitalizations for EtOH use. has been to rehab x2 for EtOH use.    Denies any other complaints.     On arrival to the ED, her vitals were T 97.8, P 74, /72, RR 24, O2 sat 95% RA. Her lab work showed anemia (no prior baseline), elevated trops but stable (possibly 2/2 renal disease, CK/CKMB wnl), and elevated BUN/Cr. Her CXR showed clear lungs. She was given aspirin 324mg PO x1 and lasix 40mg IVP x1. She was admitted to medicine on telemetry.

## 2021-04-29 NOTE — H&P ADULT - NSHPSOCIALHISTORY_GEN_ALL_CORE
Marital Status: , lives with her daughter    Occupation: Retired Licensed Practical Nurse    Tobacco Use: denies    ETOH Use: drinks alcohol on weekends only - drinks about a 1 pint    Flu Vaccine:      denies                            Pneumonia Vaccine:     fall 2020                COVID Vaccine: neg Marital Status: , lives with her daughter    Occupation: Retired Licensed Practical Nurse    Tobacco Use: denies    ETOH Use: drinks alcohol on weekends only - drinks about a 1 pint

## 2021-04-29 NOTE — H&P ADULT - NSICDXFAMILYHX_GEN_ALL_CORE_FT
FAMILY HISTORY:  Lung cancer, mother    Sibling  Still living? Unknown  Family history of chronic renal failure, Age at diagnosis: Age Unknown  Family history of myocardial infarction, Age at diagnosis: Age Unknown

## 2021-04-29 NOTE — H&P ADULT - PROBLEM SELECTOR PLAN 6
cont gabapentin - Chronic weekend EtOH use, 1 pint over the weekend, no history of hospitalizations for EtOH use but has been in rehab  - Currently no s/s of EtOH withdrawal, will monitor on low risk CIWA with prn ativan

## 2021-04-29 NOTE — PATIENT PROFILE ADULT - FALL HARM RISK TYPE OF ASSESSMENT
2/13/2017      Gerardo Lin  [address] [place] [address]    Dear Mr. Lin,    Your procedure is scheduled with Dr. Alexis Fontenot on March 23, 2017 at 9:30 a.m. at:    [facility]  [address] [place]  [address]  [phone]    Please register at [facility] on March 23, 2017 at 7:30 a.m.. You can expect to be contacted 1 to 3 days prior to the surgery to confirm arrival and surgery time. Occasionally these times may change.    The following appointment(s) have been scheduled for you:     Post-op with Dr. Alexis Fontenot at the [facility] [place] [id number] on April 7, 2017 at 2:50 p.m.     Here are instructions for your surgery:    · PLEASE SCHEDULE A PREOPERATIVE APPOINTMENT WITH YOUR PRIMARY CARE PROVIDER FOR SURGICAL CLEARANCE TO TAKE PLACE 2-3 WEEKS PRIOR TO SURGERY    · Do not take any anti--inflammatory medications (aspirin, ibuprofen, naproxen, etc) for 5 days before surgery.  Acetaminophen (Tylenol) is acceptable.    · Do not eat or drink after midnight the night before your surgery.      We will be contacting your insurance company to ensure that they have all of the pre-authorization information they need from us.  We will let you know if we encounter any issues or have any concerns in advance of your scheduled surgery.  If you have questions regarding your benefits/out-of-pocket expenses, please contact your insurance company.  If you have any questions after speaking with your insurance company regarding your surgery authorization, please contact our authorization department at [phone].    If you have any work related and/or disability forms that need to be completed, please bring these forms to our office, or contact the Forms Completion Department directly at [phone]. It takes 7 to 10 business days to complete these forms.                If you have any scheduling questions  or need to reschedule, please contact me at the telephone number and extension listed below. If you have questions regarding the procedure, medications, rehab, etc., please contact the nursing staff at [facility] scheduling line at [phone].     Thank you,        [place] at [phone].  Surgery Scheduler for Dr. Alexis Fontenot  [facility]    Enclosures: [facility] Booklet                                                                    Insurance Authorization Need to Know’s    Prior to your surgical procedure, our team will contact your Insurance Company to initiate a PreAuthorization request.      This is not a guarantee of payment from your insurance company, but rather a step taken to ensure that we have all of the information and documentation for them to confirm the procedure is one that is eligible for coverage under your plan.    We will contact you if we either need more information from you to fulfill the requirements of your insurance company, or if we need to discuss any concerns that may lead to postponement or cancellation of your procedure.     What to do if… My Insurance Changes:  If, at any time, your insurance company, plan or even card changes… Please call our office so that our team can be sure to update your records.  We will need to make sure to submit any PreAuth or jimmy to the correct, up-to-date insurance plan.      What to do if… My Insurance Requires A Referral:  If your insurance company requires a Referral for Specialty Care or to see a Specialist, you will need to confirm with them if you have one on file.    - If your insurance carrier does not have a referral, then you will need to contact your Primary Care Physician to have one directly submitted to your insurance company ASAP.    - Without a referral on file, your insurance company will not Pre-Authorize your surgery and may not cover any of your care with our  specialty.    What to do if… I have a Work Comp (W/C) Claim:  If you have a W/C claim, please be sure to provide our reception team with the information you have regarding your claim ASAP.  We will contact your W/C carrier/adjustor to inform them of your upcoming surgery and check the status of your claim (open vs closed).  We will let you know if they advise of any concerns or issues with your claim.  - Even if you have an open W/C claim, please also provide us with your personal/family insurance.  We will want to be sure this plan is loaded into your account.  We always PreAuth with personal insurance as a back-up to W/C.  Otherwise, if W/C doesn’t cover something along the way, you will receive a bill for the services.    What to do if… I have Month-to-Month Coverage/Premiums:  If you have an insurance plan that is paid for month to month, or is subject to plan change on a monthly basis, please be aware we cannot initiate PreAuth until just before the month of your surgery, as your insurance company will need to verify your premium payments/eligibility first.    What to do if… I Do Not Have Insurance Coverage:  If you do not have insurance coverage, please call our Patient Contact Center:  [phone] or a  at the hospital to discuss possible coverage options and/or billing options.     What to do if… I have other Insurance/Billing questions:If you have questions regarding our billing process, setting up payment plans, our fee schedule, etc… Please call our Patient Contact Center:  [phone].    If you need information regarding your level of benefits or out-of-pocket expenses, please contact your insurance company directly.  They can also confirm for you whether or not we (the surgeon and the hospital/surgery center) are in your plan’s preferred network (aka ‘in-network’).   admission

## 2021-04-29 NOTE — ED PROVIDER NOTE - PROGRESS NOTE DETAILS
Ajith June MD. Pro . Trop is elevated to 66 however, likely in setting of CKD. pt has no CP now. EKG was non ischemic. ASA ordered. will need further w/u for acs/chf. pt admitted to tele

## 2021-04-29 NOTE — H&P ADULT - PROBLEM SELECTOR PLAN 4
fasting lipid profile, cont statin - c/w new intermittent double vision over the past month, intermittent when watching TV, otherwise not present, no other eye complaints, no HA  - seems to have intact peripheral vision on exam currently, EOMI, PERRL  - Will check CT for further eval, consider ophthalmology eval in AM

## 2021-04-29 NOTE — ED PROVIDER NOTE - PHYSICAL EXAMINATION
PHYSICAL EXAM:  GENERAL: non-toxic appearing; tachypneic;   HEAD Atraumatic, Normocephalic  NECK: No JVD; FROM  EYES: PERRL, EOMs intact b/l w/out deficits; normal conjunctiva  CHEST/LUNG: crackles b/l up to midlungs  HEART: RRR no murmur/gallops/rubs  ABDOMEN: +BS, soft, NT, ND  EXTREMITIES: 2+ LE edema, +2 radial pulses b/l, +2 DP/PT pulses b/l  MUSCULOSKELETAL: FROM of all 4 extremities;  NERVOUS SYSTEM:  A&Ox3, No motor deficits or sensory deficits; CNII-XII intact; no focal neurologic deficits  SKIN:  No new rashes

## 2021-04-29 NOTE — H&P ADULT - NEUROLOGICAL DETAILS
alert and oriented x 3/sensation intact/cranial nerves intact/normal strength alert and oriented x 3/responds to verbal commands/sensation intact/cranial nerves intact/normal strength

## 2021-04-29 NOTE — H&P ADULT - NEGATIVE NEUROLOGICAL SYMPTOMS
no weakness/no syncope/no vertigo/no headache no weakness/no paresthesias/no syncope/no vertigo/no headache/no confusion

## 2021-04-29 NOTE — H&P ADULT - PROBLEM SELECTOR PROBLEM 1
CHF (congestive heart failure) Acute on chronic congestive heart failure, unspecified heart failure type

## 2021-04-29 NOTE — H&P ADULT - PROBLEM SELECTOR PLAN 9
- h/o PAD with recent angiogram (s/p "clearing out", no stenting as per patient), pulses soft but palpable, no LE coolness to touch, no pain, no discoloration  - Will c/w aspirin, statin therapy

## 2021-04-29 NOTE — H&P ADULT - ATTENDING COMMENTS
Patient seen and examined on 4/29/21, case discussed with RADHA Rogers. This is a 71F with history as above who presents to the hospital with complaints of worsening CHF symptoms (LE edema, BREEN/SOB, weight gain, orthopnea) and new onset chest pain (currently resolved). Also with new intermittent double vision, and intermittent dysphagia.  - Would c/w IV diuresis for her CHF exacerbation, check TTE, cards eval in AM  - Chest pain currently resolved, EKG nonischemic, trops elevated but stable, will continue to trend trops but CK/CKMB low  - Worsening renal function as per patient, here elevated BUN/Cr, will check UA, urine lytes, Renal US, monitor I/O  - CTH for eval of her double vision, consider ophtho eval in AM  - Unclear cause of her dysphagia, reports history of thyroid nodules in past, passed dysphagia screen here, will place on dysphagia diet for now, check thyroid US, S&S eval in AM, aspiration precautions  - Other management as above.

## 2021-04-29 NOTE — ED PROVIDER NOTE - OBJECTIVE STATEMENT
70 yo F PMHx CKD, DM, HLD, HTN, PAD s/p recent RLE revascularization (for claudication), CHF (2 weeks ago changed from lasix 40qd to Torsemide 20 BID), presents to the ED c/o worsening of her chronic SOB with BREEN over the past 4-5 days. Pt has 4 pillow orthopnea. Pt endorsing parosyxmal nocturnal dyspnea this week. She is also noting midsternal CP x2 days. She states she has not missed her lasix dosing. She is endorsing b/l lower leg swelling up to her thighs. She denies nausa, vomiting, cough, fevers, abd pain, diarrhea, dysuria. Of note, pt states she had an echo 4 days ago.   Her cardiologist is Pj Barahona.

## 2021-04-29 NOTE — H&P ADULT - PROBLEM SELECTOR PLAN 2
monitor fs, insulin ssc, hgba1-c, cont pre-meal and basal insulin - Pressure like chest pain earlier today, now resolved, no chest pain in hospital at rest nor when ambulating  - Trops elevated but stable, CK/CKMB negative, trops likely elevated 2/2 CKD and demand from her CHF, would trend for now, monitor on telemetry, check TTE  - Cardiology eval as per primary team  - Has recent stress test and TTE but unsure on results, will have to see if we can obtain the results from patient's cardiologist in AM

## 2021-04-29 NOTE — ED PROVIDER NOTE - MUSCULOSKELETAL, MLM
Spine appears normal, range of motion is not limited, no muscle or joint tenderness. 2+ edema to b/l LE

## 2021-04-29 NOTE — H&P ADULT - NSHPOUTPATIENTPROVIDERS_GEN_ALL_CORE
PCP: Dr. Paz Coe [phone]  Cardio: Dr. Pj Schultz  Nephrology: Dr. Escudero   Vascular: Dr. Kidd  Podiatrist; Dr. Espino  Endocrinology: NELLIE Stahl

## 2021-04-29 NOTE — H&P ADULT - ASSESSMENT
70 y/o female, with a PmHx of HTN, DM, CHF, CKD, HLD. PAD s/p revascularization, presented to the [facility] ED with sob. Admitted to telemetry for acute on chronic systolic heart failure. This is a 71F with history of DM2 with neuropathy, HTN, Recently diagnosed CKD, PAD s/p angiogram RLE (pt denies any stents, just "clearing out"), CKD, HLD, and Thyroid nodules (benign biopsy x2 as per patient) who presents to the hospital with complaints of worsening SOB, LE edema, orthopnea, weight gain, and increasing abdominal girth likely 2/2 CHF exacerbation. Also with complaints of new intermittent double vision (occasionally when watching TV, does not occur otherwise) and new dysphagia intermittently.

## 2021-04-29 NOTE — H&P ADULT - NSHPPHYSICALEXAM_GEN_ALL_CORE
Vital Signs Last 24 Hrs  T(C): 37.1 (29 Apr 2021 15:18), Max: 37.1 (29 Apr 2021 15:18)  T(F): 98.7 (29 Apr 2021 15:18), Max: 98.7 (29 Apr 2021 15:18)  HR: 67 (29 Apr 2021 15:18) (67 - 74)  BP: 164/72 (29 Apr 2021 15:18) (164/72 - 169/82)  BP(mean): --  RR: 18 (29 Apr 2021 15:18) (18 - 24)  SpO2: 98% (29 Apr 2021 15:18) (95% - 98%)    EKG: NSR @ 70, no changes, LVH Vital Signs Last 24 Hrs  T(C): 37.1 (29 Apr 2021 15:18), Max: 37.1 (29 Apr 2021 15:18)  T(F): 98.7 (29 Apr 2021 15:18), Max: 98.7 (29 Apr 2021 15:18)  HR: 67 (29 Apr 2021 15:18) (67 - 74)  BP: 164/72 (29 Apr 2021 15:18) (164/72 - 169/82)  BP(mean): --  RR: 18 (29 Apr 2021 15:18) (18 - 24)  SpO2: 98% (29 Apr 2021 15:18) (95% - 98%)

## 2021-04-29 NOTE — ED PROVIDER NOTE - NS ED MD TWO NIGHTS YN
"DASH Eating Plan  DASH stands for \"Dietary Approaches to Stop Hypertension.\" The DASH eating plan is a healthy eating plan that has been shown to reduce high blood pressure (hypertension). It may also reduce your risk for type 2 diabetes, heart disease, and stroke. The DASH eating plan may also help with weight loss.  What are tips for following this plan?  General guidelines   · Avoid eating more than 2,300 mg (milligrams) of salt (sodium) a day. If you have hypertension, you may need to reduce your sodium intake to 1,500 mg a day.  · Limit alcohol intake to no more than 1 drink a day for nonpregnant women and 2 drinks a day for men. One drink equals 12 oz of beer, 5 oz of wine, or 1½ oz of hard liquor.  · Work with your health care provider to maintain a healthy body weight or to lose weight. Ask what an ideal weight is for you.  · Get at least 30 minutes of exercise that causes your heart to beat faster (aerobic exercise) most days of the week. Activities may include walking, swimming, or biking.  · Work with your health care provider or diet and nutrition specialist (dietitian) to adjust your eating plan to your individual calorie needs.  Reading food labels   · Check food labels for the amount of sodium per serving. Choose foods with less than 5 percent of the Daily Value of sodium. Generally, foods with less than 300 mg of sodium per serving fit into this eating plan.  · To find whole grains, look for the word \"whole\" as the first word in the ingredient list.  Shopping   · Buy products labeled as \"low-sodium\" or \"no salt added.\"  · Buy fresh foods. Avoid canned foods and premade or frozen meals.  Cooking   · Avoid adding salt when cooking. Use salt-free seasonings or herbs instead of table salt or sea salt. Check with your health care provider or pharmacist before using salt substitutes.  · Do not edwards foods. Cook foods using healthy methods such as baking, boiling, grilling, and broiling instead.  · Cook with " heart-healthy oils, such as olive, canola, soybean, or sunflower oil.  Meal planning     · Eat a balanced diet that includes:  ¨ 5 or more servings of fruits and vegetables each day. At each meal, try to fill half of your plate with fruits and vegetables.  ¨ Up to 6-8 servings of whole grains each day.  ¨ Less than 6 oz of lean meat, poultry, or fish each day. A 3-oz serving of meat is about the same size as a deck of cards. One egg equals 1 oz.  ¨ 2 servings of low-fat dairy each day.  ¨ A serving of nuts, seeds, or beans 5 times each week.  ¨ Heart-healthy fats. Healthy fats called Omega-3 fatty acids are found in foods such as flaxseeds and coldwater fish, like sardines, salmon, and mackerel.  · Limit how much you eat of the following:  ¨ Canned or prepackaged foods.  ¨ Food that is high in trans fat, such as fried foods.  ¨ Food that is high in saturated fat, such as fatty meat.  ¨ Sweets, desserts, sugary drinks, and other foods with added sugar.  ¨ Full-fat dairy products.  · Do not salt foods before eating.  · Try to eat at least 2 vegetarian meals each week.  · Eat more home-cooked food and less restaurant, buffet, and fast food.  · When eating at a restaurant, ask that your food be prepared with less salt or no salt, if possible.  What foods are recommended?  The items listed may not be a complete list. Talk with your dietitian about what dietary choices are best for you.  Grains   Whole-grain or whole-wheat bread. Whole-grain or whole-wheat pasta. Brown rice. Oatmeal. Quinoa. Bulgur. Whole-grain and low-sodium cereals. Libby bread. Low-fat, low-sodium crackers. Whole-wheat flour tortillas.  Vegetables   Fresh or frozen vegetables (raw, steamed, roasted, or grilled). Low-sodium or reduced-sodium tomato and vegetable juice. Low-sodium or reduced-sodium tomato sauce and tomato paste. Low-sodium or reduced-sodium canned vegetables.  Fruits   All fresh, dried, or frozen fruit. Canned fruit in natural juice  (without added sugar).  Meat and other protein foods   Skinless chicken or turkey. Ground chicken or turkey. Pork with fat trimmed off. Fish and seafood. Egg whites. Dried beans, peas, or lentils. Unsalted nuts, nut butters, and seeds. Unsalted canned beans. Lean cuts of beef with fat trimmed off. Low-sodium, lean deli meat.  Dairy   Low-fat (1%) or fat-free (skim) milk. Fat-free, low-fat, or reduced-fat cheeses. Nonfat, low-sodium ricotta or cottage cheese. Low-fat or nonfat yogurt. Low-fat, low-sodium cheese.  Fats and oils   Soft margarine without trans fats. Vegetable oil. Low-fat, reduced-fat, or light mayonnaise and salad dressings (reduced-sodium). Canola, safflower, olive, soybean, and sunflower oils. Avocado.  Seasoning and other foods   Herbs. Spices. Seasoning mixes without salt. Unsalted popcorn and pretzels. Fat-free sweets.  What foods are not recommended?  The items listed may not be a complete list. Talk with your dietitian about what dietary choices are best for you.  Grains   Baked goods made with fat, such as croissants, muffins, or some breads. Dry pasta or rice meal packs.  Vegetables   Creamed or fried vegetables. Vegetables in a cheese sauce. Regular canned vegetables (not low-sodium or reduced-sodium). Regular canned tomato sauce and paste (not low-sodium or reduced-sodium). Regular tomato and vegetable juice (not low-sodium or reduced-sodium). Pickles. Olives.  Fruits   Canned fruit in a light or heavy syrup. Fried fruit. Fruit in cream or butter sauce.  Meat and other protein foods   Fatty cuts of meat. Ribs. Fried meat. Preston. Sausage. Bologna and other processed lunch meats. Salami. Fatback. Hotdogs. Bratwurst. Salted nuts and seeds. Canned beans with added salt. Canned or smoked fish. Whole eggs or egg yolks. Chicken or turkey with skin.  Dairy   Whole or 2% milk, cream, and half-and-half. Whole or full-fat cream cheese. Whole-fat or sweetened yogurt. Full-fat cheese. Nondairy creamers.  Whipped toppings. Processed cheese and cheese spreads.  Fats and oils   Butter. Stick margarine. Lard. Shortening. Ghee. Preston fat. Tropical oils, such as coconut, palm kernel, or palm oil.  Seasoning and other foods   Salted popcorn and pretzels. Onion salt, garlic salt, seasoned salt, table salt, and sea salt. Worcestershire sauce. Tartar sauce. Barbecue sauce. Teriyaki sauce. Soy sauce, including reduced-sodium. Steak sauce. Canned and packaged gravies. Fish sauce. Oyster sauce. Cocktail sauce. Horseradish that you find on the shelf. Ketchup. Mustard. Meat flavorings and tenderizers. Bouillon cubes. Hot sauce and Tabasco sauce. Premade or packaged marinades. Premade or packaged taco seasonings. Relishes. Regular salad dressings.  Where to find more information:  · National Heart, Lung, and Blood [place]: www.nhlbi.nih.gov  · American Heart Association: www.heart.org  Summary  · The DASH eating plan is a healthy eating plan that has been shown to reduce high blood pressure (hypertension). It may also reduce your risk for type 2 diabetes, heart disease, and stroke.  · With the DASH eating plan, you should limit salt (sodium) intake to 2,300 mg a day. If you have hypertension, you may need to reduce your sodium intake to 1,500 mg a day.  · When on the DASH eating plan, aim to eat more fresh fruits and vegetables, whole grains, lean proteins, low-fat dairy, and heart-healthy fats.  · Work with your health care provider or diet and nutrition specialist (dietitian) to adjust your eating plan to your individual calorie needs.  This information is not intended to replace advice given to you by your health care provider. Make sure you discuss any questions you have with your health care provider.  Document Released: 12/06/2012 Document Revised: 12/11/2017 Document Reviewed: 12/11/2017  [employer] Interactive Patient Education © 2017 [employer]    Upper Respiratory Infection, Adult  Most upper respiratory infections  "(URIs) are a viral infection of the air passages leading to the lungs. A URI affects the nose, throat, and upper air passages. The most common type of URI is nasopharyngitis and is typically referred to as \"the common cold.\"  URIs run their course and usually go away on their own. Most of the time, a URI does not require medical attention, but sometimes a bacterial infection in the upper airways can follow a viral infection. This is called a secondary infection. Sinus and middle ear infections are common types of secondary upper respiratory infections.  Bacterial pneumonia can also complicate a URI. A URI can worsen asthma and chronic obstructive pulmonary disease (COPD). Sometimes, these complications can require emergency medical care and may be life threatening.  What are the causes?  Almost all URIs are caused by viruses. A virus is a type of germ and can spread from one person to another.  What increases the risk?  You may be at risk for a URI if:  · You smoke.  · You have chronic heart or lung disease.  · You have a weakened defense (immune) system.  · You are very young or very old.  · You have nasal allergies or asthma.  · You work in crowded or poorly ventilated areas.  · You work in health care facilities or schools.  What are the signs or symptoms?  Symptoms typically develop 2-3 days after you come in contact with a cold virus. Most viral URIs last 7-10 days. However, viral URIs from the influenza virus (flu virus) can last 14-18 days and are typically more severe. Symptoms may include:  · Runny or stuffy (congested) nose.  · Sneezing.  · Cough.  · Sore throat.  · Headache.  · Fatigue.  · Fever.  · Loss of appetite.  · Pain in your forehead, behind your eyes, and over your cheekbones (sinus pain).  · Muscle aches.  How is this diagnosed?  Your health care provider may diagnose a URI by:  · Physical exam.  · Tests to check that your symptoms are not due to another condition such as:  ¨ Strep " throat.  ¨ Sinusitis.  ¨ Pneumonia.  ¨ Asthma.  How is this treated?  A URI goes away on its own with time. It cannot be cured with medicines, but medicines may be prescribed or recommended to relieve symptoms. Medicines may help:  · Reduce your fever.  · Reduce your cough.  · Relieve nasal congestion.  Follow these instructions at home:  · Take medicines only as directed by your health care provider.  · Gargle warm saltwater or take cough drops to comfort your throat as directed by your health care provider.  · Use a warm mist humidifier or inhale steam from a shower to increase air moisture. This may make it easier to breathe.  · Drink enough fluid to keep your urine clear or pale yellow.  · Eat soups and other clear broths and maintain good nutrition.  · Rest as needed.  · Return to work when your temperature has returned to normal or as your health care provider advises. You may need to stay home longer to avoid infecting others. You can also use a face mask and careful hand washing to prevent spread of the virus.  · Increase the usage of your inhaler if you have asthma.  · Do not use any tobacco products, including cigarettes, chewing tobacco, or electronic cigarettes. If you need help quitting, ask your health care provider.  How is this prevented?  The best way to protect yourself from getting a cold is to practice good hygiene.  · Avoid oral or hand contact with people with cold symptoms.  · Wash your hands often if contact occurs.  There is no clear evidence that vitamin C, vitamin E, echinacea, or exercise reduces the chance of developing a cold. However, it is always recommended to get plenty of rest, exercise, and practice good nutrition.  Contact a health care provider if:  · You are getting worse rather than better.  · Your symptoms are not controlled by medicine.  · You have chills.  · You have worsening shortness of breath.  · You have brown or red mucus.  · You have yellow or brown nasal  discharge.  · You have pain in your face, especially when you bend forward.  · You have a fever.  · You have swollen neck glands.  · You have pain while swallowing.  · You have white areas in the back of your throat.  Get help right away if:  · You have severe or persistent:  ¨ Headache.  ¨ Ear pain.  ¨ Sinus pain.  ¨ Chest pain.  · You have chronic lung disease and any of the following:  ¨ Wheezing.  ¨ Prolonged cough.  ¨ Coughing up blood.  ¨ A change in your usual mucus.  · You have a stiff neck.  · You have changes in your:  ¨ Vision.  ¨ Hearing.  ¨ Thinking.  ¨ Mood.  This information is not intended to replace advice given to you by your health care provider. Make sure you discuss any questions you have with your health care provider.  Document Released: 06/13/2002 Document Revised: 08/20/2017 Document Reviewed: 03/25/2015  [employer] Interactive Patient Education © 2017 [employer]    Steps to Quit Smoking  Smoking tobacco can be bad for your health. It can also affect almost every organ in your body. Smoking puts you and people around you at risk for many serious long-lasting (chronic) diseases. Quitting smoking is hard, but it is one of the best things that you can do for your health. It is never too late to quit.  What are the benefits of quitting smoking?  When you quit smoking, you lower your risk for getting serious diseases and conditions. They can include:  · Lung cancer or lung disease.  · Heart disease.  · Stroke.  · Heart attack.  · Not being able to have children (infertility).  · Weak bones (osteoporosis) and broken bones (fractures).  If you have coughing, wheezing, and shortness of breath, those symptoms may get better when you quit. You may also get sick less often. If you are pregnant, quitting smoking can help to lower your chances of having a baby of low birth weight.  What can I do to help me quit smoking?  Talk with your doctor about what can help you quit smoking. Some things you can do  (strategies) include:  · Quitting smoking totally, instead of slowly cutting back how much you smoke over a period of time.  · Going to in-person counseling. You are more likely to quit if you go to many counseling sessions.  · Using resources and support systems, such as:  ¨ Online chats with a counselor.  ¨ Phone quitlines.  ¨ Printed self-help materials.  ¨ Support groups or group counseling.  ¨ Text messaging programs.  ¨ Mobile phone apps or applications.  · Taking medicines. Some of these medicines may have nicotine in them. If you are pregnant or breastfeeding, do not take any medicines to quit smoking unless your doctor says it is okay. Talk with your doctor about counseling or other things that can help you.  Talk with your doctor about using more than one strategy at the same time, such as taking medicines while you are also going to in-person counseling. This can help make quitting easier.  What things can I do to make it easier to quit?  Quitting smoking might feel very hard at first, but there is a lot that you can do to make it easier. Take these steps:  · Talk to your family and friends. Ask them to support and encourage you.  · Call phone quitlines, reach out to support groups, or work with a counselor.  · Ask people who smoke to not smoke around you.  · Avoid places that make you want (trigger) to smoke, such as:  ¨ Bars.  ¨ Parties.  ¨ Smoke-break areas at work.  · Spend time with people who do not smoke.  · Lower the stress in your life. Stress can make you want to smoke. Try these things to help your stress:  ¨ Getting regular exercise.  ¨ Deep-breathing exercises.  ¨ Yoga.  ¨ Meditating.  ¨ Doing a body scan. To do this, close your eyes, focus on one area of your body at a time from head to toe, and notice which parts of your body are tense. Try to relax the muscles in those areas.  · Download or buy apps on your mobile phone or tablet that can help you stick to your quit plan. There are many  free apps, such as QuitGuide from the CDC (Centers for Disease Control and Prevention). You can find more support from smokefree.gov and other websites.  This information is not intended to replace advice given to you by your health care provider. Make sure you discuss any questions you have with your health care provider.  Document Released: 10/14/2010 Document Revised: 08/15/2017 Document Reviewed: 05/03/2016  [employer] Interactive Patient Education © 2017 [employer]     Yes

## 2021-04-29 NOTE — ED PROVIDER NOTE - CLINICAL SUMMARY MEDICAL DECISION MAKING FREE TEXT BOX
Ajith June MD. 70 yo F PMHx CKD, DM, HTN, PAD, CHF, presents to ED c/o worsening of her chronic SOB w/ BREEN over the past 4-5 days despite taking her Torsemide 20 mg BID (recently adjusted from Lasix about 2 weeks ago). Pt endorses 4 pillow orthopnea. This week, having PND. Also endorsing 2 days of midsternal CP. Pt appears tachypneic. Crackles up to the mid lungs bilaterally with 2+ pitting edema up to the thighs. Pt appears tachypneic. Concern for CHF exacerbation. Cause could be 2/2 ACS vs adjustment to new dose? of torsemide. Will diuresis w/ Lasix, obtain labs, cxr, ekg, cardiac monitor, likely admit Ajith June MD. 72 yo F PMHx CKD, DM, HTN, PAD, CHF, presents to ED c/o worsening of her chronic SOB w/ BREEN over the past 4-5 days despite taking her Torsemide 20 mg BID (recently adjusted from Lasix about 2 weeks ago). Pt endorses 4 pillow orthopnea. This week, having PND. Also endorsing 2 days of midsternal CP. Pt appears tachypneic. Crackles up to the mid lungs bilaterally with 2+ pitting edema up to the thighs. Pt appears tachypneic. Concern for CHF exacerbation. Cause could be 2/2 ACS vs adjustment to new dose? of torsemide. Well's Low risk for VTE, especially bc CHF is more likely dx (pt also not tachy, not hypoxic). Will diuresis w/ Lasix, obtain labs, cxr, ekg, cardiac monitor, likely admit

## 2021-04-29 NOTE — H&P ADULT - PROBLEM SELECTOR PLAN 10
DVT ppx - Heparin 5,000u sc tid  Diet - Dysphagia 3 with honey thick liquids for now, S&S eval in AM  Activity - Ambulate with assistance    Fall and Aspiration precautions

## 2021-04-29 NOTE — H&P ADULT - NEGATIVE OPHTHALMOLOGIC SYMPTOMS
no diplopia/no photophobia/no loss of vision L/no loss of vision R no photophobia/no lacrimation L/no lacrimation R/no blurred vision L/no blurred vision R/no discharge L/no discharge R/no pain L/no pain R/no irritation L/no irritation R/no loss of vision L/no loss of vision R/no scleral injection L/no scleral injection R

## 2021-04-29 NOTE — H&P ADULT - RS GEN PE MLT RESP DETAILS PC
airway patent/breath sounds equal/good air movement/respirations non-labored/clear to auscultation bilaterally/no chest wall tenderness airway patent/breath sounds equal/good air movement/respirations non-labored/no chest wall tenderness/rales

## 2021-04-29 NOTE — H&P ADULT - NSHPLABSRESULTS_GEN_ALL_CORE
LABS and ADDITIONAL STUDIES:                        10.9   4.70  )-----------( 198      ( 29 Apr 2021 10:24 )             34.8   Complete Blood Count + Automated Diff (04.29.21 @ 10:24)   Mean Cell Volume: 91.6 fL   Mean Cell Hemoglobin: 28.7 pg   Mean Cell Hemoglobin Conc: 31.3 gm/dL     04-29    141  |  105  |  44<H>  ----------------------------<  74  4.8   |  26  |  2.41<H>    Ca    10.6<H>      29 Apr 2021 10:24  Phos  3.4     04-29  Mg     2.5     04-29    TPro  7.3  /  Alb  4.2  /  TBili  0.4  /  DBili  x   /  AST  23  /  ALT  25  /  AlkPhos  122<H>  04-29    CARDIAC MARKERS ( 29 Apr 2021 11:51 )  x     / x     / 161 U/L / x     / 2.9 ng/mL  CARDIAC MARKERS ( 29 Apr 2021 10:24 )  x     / x     / 162 U/L / x     / 3.0 ng/mL  Troponin T, High Sensitivity (04.29.21 @ 10:24)   Troponin T, High Sensitivity Result: 66  Troponin T, High Sensitivity (04.29.21 @ 11:45)   Troponin T, High Sensitivity Result: 64      LIVER FUNCTIONS - ( 29 Apr 2021 10:24 )  Alb: 4.2 g/dL / Pro: 7.3 g/dL / ALK PHOS: 122 U/L / ALT: 25 U/L / AST: 23 U/L / GGT: x           EKG - NSR @ 70, QTc 444, no significant ST-T wave changes, LVH

## 2021-04-29 NOTE — H&P ADULT - NEGATIVE MUSCULOSKELETAL SYMPTOMS
no arthralgia/no stiffness/no neck pain/no back pain no arthralgia/no myalgia/no stiffness/no neck pain/no back pain/no leg pain L/no leg pain R

## 2021-04-30 DIAGNOSIS — R06.02 SHORTNESS OF BREATH: ICD-10-CM

## 2021-04-30 LAB
A1C WITH ESTIMATED AVERAGE GLUCOSE RESULT: 6.8 % — HIGH (ref 4–5.6)
ALBUMIN SERPL ELPH-MCNC: 3.5 G/DL — SIGNIFICANT CHANGE UP (ref 3.3–5)
ALP SERPL-CCNC: 110 U/L — SIGNIFICANT CHANGE UP (ref 40–120)
ALT FLD-CCNC: 23 U/L — SIGNIFICANT CHANGE UP (ref 4–33)
ANION GAP SERPL CALC-SCNC: 13 MMOL/L — SIGNIFICANT CHANGE UP (ref 7–14)
AST SERPL-CCNC: 25 U/L — SIGNIFICANT CHANGE UP (ref 4–32)
BASOPHILS # BLD AUTO: 0.01 K/UL — SIGNIFICANT CHANGE UP (ref 0–0.2)
BASOPHILS NFR BLD AUTO: 0.2 % — SIGNIFICANT CHANGE UP (ref 0–2)
BILIRUB SERPL-MCNC: 0.5 MG/DL — SIGNIFICANT CHANGE UP (ref 0.2–1.2)
BUN SERPL-MCNC: 46 MG/DL — HIGH (ref 7–23)
CALCIUM SERPL-MCNC: 10.2 MG/DL — SIGNIFICANT CHANGE UP (ref 8.4–10.5)
CHLORIDE SERPL-SCNC: 103 MMOL/L — SIGNIFICANT CHANGE UP (ref 98–107)
CHOLEST SERPL-MCNC: 132 MG/DL — SIGNIFICANT CHANGE UP
CK MB BLD-MCNC: 1.7 % — SIGNIFICANT CHANGE UP (ref 0–2.5)
CK MB CFR SERPL CALC: 2.2 NG/ML — SIGNIFICANT CHANGE UP
CK MB CFR SERPL CALC: 2.4 NG/ML — SIGNIFICANT CHANGE UP
CK SERPL-CCNC: 133 U/L — SIGNIFICANT CHANGE UP (ref 25–170)
CO2 SERPL-SCNC: 23 MMOL/L — SIGNIFICANT CHANGE UP (ref 22–31)
COVID-19 SPIKE DOMAIN AB INTERP: POSITIVE
COVID-19 SPIKE DOMAIN ANTIBODY RESULT: >250 U/ML — HIGH
CREAT SERPL-MCNC: 2.43 MG/DL — HIGH (ref 0.5–1.3)
EOSINOPHIL # BLD AUTO: 0.12 K/UL — SIGNIFICANT CHANGE UP (ref 0–0.5)
EOSINOPHIL NFR BLD AUTO: 2.4 % — SIGNIFICANT CHANGE UP (ref 0–6)
ESTIMATED AVERAGE GLUCOSE: 148 MG/DL — HIGH (ref 68–114)
GLUCOSE BLDC GLUCOMTR-MCNC: 106 MG/DL — HIGH (ref 70–99)
GLUCOSE BLDC GLUCOMTR-MCNC: 144 MG/DL — HIGH (ref 70–99)
GLUCOSE BLDC GLUCOMTR-MCNC: 146 MG/DL — HIGH (ref 70–99)
GLUCOSE BLDC GLUCOMTR-MCNC: 217 MG/DL — HIGH (ref 70–99)
GLUCOSE SERPL-MCNC: 145 MG/DL — HIGH (ref 70–99)
HCT VFR BLD CALC: 33.5 % — LOW (ref 34.5–45)
HDLC SERPL-MCNC: 37 MG/DL — LOW
HGB BLD-MCNC: 10.6 G/DL — LOW (ref 11.5–15.5)
IANC: 2.79 K/UL — SIGNIFICANT CHANGE UP (ref 1.5–8.5)
IMM GRANULOCYTES NFR BLD AUTO: 0.2 % — SIGNIFICANT CHANGE UP (ref 0–1.5)
LIPID PNL WITH DIRECT LDL SERPL: 72 MG/DL — SIGNIFICANT CHANGE UP
LYMPHOCYTES # BLD AUTO: 1.54 K/UL — SIGNIFICANT CHANGE UP (ref 1–3.3)
LYMPHOCYTES # BLD AUTO: 30.4 % — SIGNIFICANT CHANGE UP (ref 13–44)
MAGNESIUM SERPL-MCNC: 2.5 MG/DL — SIGNIFICANT CHANGE UP (ref 1.6–2.6)
MCHC RBC-ENTMCNC: 28.5 PG — SIGNIFICANT CHANGE UP (ref 27–34)
MCHC RBC-ENTMCNC: 31.6 GM/DL — LOW (ref 32–36)
MCV RBC AUTO: 90.1 FL — SIGNIFICANT CHANGE UP (ref 80–100)
MONOCYTES # BLD AUTO: 0.6 K/UL — SIGNIFICANT CHANGE UP (ref 0–0.9)
MONOCYTES NFR BLD AUTO: 11.8 % — SIGNIFICANT CHANGE UP (ref 2–14)
NEUTROPHILS # BLD AUTO: 2.79 K/UL — SIGNIFICANT CHANGE UP (ref 1.8–7.4)
NEUTROPHILS NFR BLD AUTO: 55 % — SIGNIFICANT CHANGE UP (ref 43–77)
NON HDL CHOLESTEROL: 95 MG/DL — SIGNIFICANT CHANGE UP
NRBC # BLD: 0 /100 WBCS — SIGNIFICANT CHANGE UP
NRBC # FLD: 0 K/UL — SIGNIFICANT CHANGE UP
NT-PROBNP SERPL-SCNC: 303 PG/ML — HIGH
PHOSPHATE SERPL-MCNC: 3.8 MG/DL — SIGNIFICANT CHANGE UP (ref 2.5–4.5)
PLATELET # BLD AUTO: 194 K/UL — SIGNIFICANT CHANGE UP (ref 150–400)
POTASSIUM SERPL-MCNC: 5.2 MMOL/L — SIGNIFICANT CHANGE UP (ref 3.5–5.3)
POTASSIUM SERPL-SCNC: 5.2 MMOL/L — SIGNIFICANT CHANGE UP (ref 3.5–5.3)
PROT SERPL-MCNC: 6.9 G/DL — SIGNIFICANT CHANGE UP (ref 6–8.3)
PTH-INTACT FLD-MCNC: 201 PG/ML — HIGH (ref 15–65)
RBC # BLD: 3.72 M/UL — LOW (ref 3.8–5.2)
RBC # FLD: 15.1 % — HIGH (ref 10.3–14.5)
SARS-COV-2 IGG+IGM SERPL QL IA: >250 U/ML — HIGH
SARS-COV-2 IGG+IGM SERPL QL IA: POSITIVE
SODIUM SERPL-SCNC: 139 MMOL/L — SIGNIFICANT CHANGE UP (ref 135–145)
TRIGL SERPL-MCNC: 117 MG/DL — SIGNIFICANT CHANGE UP
TROPONIN T, HIGH SENSITIVITY RESULT: 61 NG/L — CRITICAL HIGH
TROPONIN T, HIGH SENSITIVITY RESULT: 62 NG/L — CRITICAL HIGH
TSH SERPL-MCNC: 2.19 UIU/ML — SIGNIFICANT CHANGE UP (ref 0.27–4.2)
WBC # BLD: 5.07 K/UL — SIGNIFICANT CHANGE UP (ref 3.8–10.5)
WBC # FLD AUTO: 5.07 K/UL — SIGNIFICANT CHANGE UP (ref 3.8–10.5)

## 2021-04-30 PROCEDURE — 76536 US EXAM OF HEAD AND NECK: CPT | Mod: 26

## 2021-04-30 PROCEDURE — 99223 1ST HOSP IP/OBS HIGH 75: CPT

## 2021-04-30 PROCEDURE — 70450 CT HEAD/BRAIN W/O DYE: CPT | Mod: 26

## 2021-04-30 PROCEDURE — 93970 EXTREMITY STUDY: CPT | Mod: 26

## 2021-04-30 PROCEDURE — 99233 SBSQ HOSP IP/OBS HIGH 50: CPT

## 2021-04-30 PROCEDURE — 76770 US EXAM ABDO BACK WALL COMP: CPT | Mod: 26

## 2021-04-30 RX ORDER — ISOSORBIDE DINITRATE 5 MG/1
10 TABLET ORAL THREE TIMES A DAY
Refills: 0 | Status: DISCONTINUED | OUTPATIENT
Start: 2021-04-30 | End: 2021-05-22

## 2021-04-30 RX ORDER — FUROSEMIDE 40 MG
5 TABLET ORAL
Qty: 500 | Refills: 0 | Status: DISCONTINUED | OUTPATIENT
Start: 2021-04-30 | End: 2021-05-03

## 2021-04-30 RX ADMIN — Medication 100 MILLIGRAM(S): at 15:21

## 2021-04-30 RX ADMIN — Medication 100 MILLIGRAM(S): at 12:36

## 2021-04-30 RX ADMIN — Medication 81 MILLIGRAM(S): at 12:36

## 2021-04-30 RX ADMIN — Medication 40 MILLIGRAM(S): at 06:01

## 2021-04-30 RX ADMIN — Medication 100 MILLIGRAM(S): at 21:47

## 2021-04-30 RX ADMIN — HEPARIN SODIUM 5000 UNIT(S): 5000 INJECTION INTRAVENOUS; SUBCUTANEOUS at 06:01

## 2021-04-30 RX ADMIN — CARVEDILOL PHOSPHATE 6.25 MILLIGRAM(S): 80 CAPSULE, EXTENDED RELEASE ORAL at 06:01

## 2021-04-30 RX ADMIN — Medication 2.5 MG/HR: at 21:47

## 2021-04-30 RX ADMIN — HUMAN INSULIN 20 UNIT(S): 100 INJECTION, SUSPENSION SUBCUTANEOUS at 09:15

## 2021-04-30 RX ADMIN — GABAPENTIN 300 MILLIGRAM(S): 400 CAPSULE ORAL at 15:22

## 2021-04-30 RX ADMIN — Medication 40 MILLIGRAM(S): at 17:09

## 2021-04-30 RX ADMIN — Medication 1 TABLET(S): at 12:36

## 2021-04-30 RX ADMIN — ATORVASTATIN CALCIUM 80 MILLIGRAM(S): 80 TABLET, FILM COATED ORAL at 21:47

## 2021-04-30 RX ADMIN — HEPARIN SODIUM 5000 UNIT(S): 5000 INJECTION INTRAVENOUS; SUBCUTANEOUS at 15:22

## 2021-04-30 RX ADMIN — Medication 100 MILLIGRAM(S): at 06:00

## 2021-04-30 RX ADMIN — GABAPENTIN 300 MILLIGRAM(S): 400 CAPSULE ORAL at 21:47

## 2021-04-30 RX ADMIN — HEPARIN SODIUM 5000 UNIT(S): 5000 INJECTION INTRAVENOUS; SUBCUTANEOUS at 21:46

## 2021-04-30 RX ADMIN — HUMAN INSULIN 20 UNIT(S): 100 INJECTION, SUSPENSION SUBCUTANEOUS at 17:42

## 2021-04-30 RX ADMIN — GABAPENTIN 300 MILLIGRAM(S): 400 CAPSULE ORAL at 06:00

## 2021-04-30 RX ADMIN — CARVEDILOL PHOSPHATE 6.25 MILLIGRAM(S): 80 CAPSULE, EXTENDED RELEASE ORAL at 17:09

## 2021-04-30 NOTE — PROGRESS NOTE ADULT - PROBLEM SELECTOR PLAN 6
- Chronic weekend EtOH use, 1 pint over the weekend, no history of hospitalizations for EtOH use but has been in rehab  - Currently no s/s of EtOH withdrawal, will monitor on low risk CIWA with prn ativan

## 2021-04-30 NOTE — PROGRESS NOTE ADULT - PROBLEM SELECTOR PLAN 1
- Presenting with worsening SOB, orthopnea, b/l LE edema, weight gain, increasing abdominal girth, here with bibasilar crackles and JVP concerning for CHF exacerbation  - c/w 40 IV lasix BID   - TTE pending   - 1.5L fluid restriction, daily weights, I/O   - heart failure consulted

## 2021-04-30 NOTE — CONSULT NOTE ADULT - ATTENDING COMMENTS
Continue current regimen.  Get echo.  Get cardiac records. Change Lasix to 5 mg/hr.  Get echo.  Get cardiac records from cardiologist. Change Lasix to 5 mg/hr.  Start Isordil 10 mg po tid.  Get echo.  Get cardiac records from cardiologist.

## 2021-04-30 NOTE — PROGRESS NOTE ADULT - PROBLEM SELECTOR PLAN 5
- c/o new onset dysphagia, noted over the past few weeks, mostly to large pills, occasionally to food  - here passed dysphagia screen, currently on dysphagia 3pending S&S eval

## 2021-04-30 NOTE — CONSULT NOTE ADULT - ASSESSMENT
71 y/p female with PMHX of HTN, DM II, HLD, PAD s/p RLE angiogram, CKD comes in with complaints of worsening SOB, LE edema, abdominal distension.   CXR shows clear lungs, LE dopplers show no DVT, CT head unremarkable. Labs significant for BUN/Cr 46/2.43, K 5.2, proBNP 303. She states she was recently told she had a weak heart and has been placed on oral diuretics, but with minimal relief. She admits to 4 pillow orthopnea, BREEN w/ minimal exertion. No PND, CP, palpitations, dizziness. She has had a stress test with her cardiologist Dr. Alas a few months ago, but unclear of the results. Admits to drinking 1 pint of vodka most weekends. Non smoker, no other drug use.       Hypervolemic, hypertensive.

## 2021-04-30 NOTE — PROGRESS NOTE ADULT - ASSESSMENT
71F PMH of DM2 with neuropathy, HTN, Recently diagnosed CKD, PAD s/p angiogram RLE (pt denies any stents, just "clearing out"), CKD, HLD, and Thyroid nodules (benign biopsy x2 as per patient) who presents to the hospital with complaints of worsening SOB, LE edema, orthopnea, weight gain, and increasing abdominal girth likely 2/2 CHF exacerbation. Also with complaints of new intermittent double vision (occasionally when watching TV, does not occur otherwise) and new dysphagia intermittently.

## 2021-04-30 NOTE — CONSULT NOTE ADULT - SUBJECTIVE AND OBJECTIVE BOX
Date of Admission:    CHIEF COMPLAINT:    HISTORY OF PRESENT ILLNESS:      Allergies    No Known Allergies    Intolerances    	    MEDICATIONS:  aspirin enteric coated 81 milliGRAM(s) Oral daily  carvedilol 6.25 milliGRAM(s) Oral every 12 hours  furosemide   Injectable 40 milliGRAM(s) IV Push two times a day  heparin   Injectable 5000 Unit(s) SubCutaneous every 8 hours  hydrALAZINE 100 milliGRAM(s) Oral three times a day        acetaminophen   Tablet .. 650 milliGRAM(s) Oral every 6 hours PRN  gabapentin 300 milliGRAM(s) Oral three times a day  LORazepam     Tablet 1 milliGRAM(s) Oral every 2 hours PRN  LORazepam     Tablet 1 milliGRAM(s) Oral every 1 hour PRN      allopurinol 100 milliGRAM(s) Oral daily  atorvastatin 80 milliGRAM(s) Oral at bedtime  dextrose 40% Gel 15 Gram(s) Oral once  dextrose 50% Injectable 25 Gram(s) IV Push once  dextrose 50% Injectable 12.5 Gram(s) IV Push once  dextrose 50% Injectable 25 Gram(s) IV Push once  glucagon  Injectable 1 milliGRAM(s) IntraMuscular once  insulin lispro (ADMELOG) corrective regimen sliding scale   SubCutaneous three times a day before meals  insulin lispro (ADMELOG) corrective regimen sliding scale   SubCutaneous at bedtime  insulin NPH human recombinant 20 Unit(s) SubCutaneous two times a day    dextrose 5%. 1000 milliLiter(s) IV Continuous <Continuous>  dextrose 5%. 1000 milliLiter(s) IV Continuous <Continuous>  ketotifen 0.025% Ophthalmic Solution 1 Drop(s) Both EYES two times a day PRN  multivitamin 1 Tablet(s) Oral daily      PAST MEDICAL & SURGICAL HISTORY:  CKD (chronic kidney disease)    Diabetes    HLD (hyperlipidemia)    HTN (hypertension)    PVD (peripheral vascular disease)    CHF (congestive heart failure)    Peripheral neuropathy    History of angioplasty of peripheral vessel  RLE - no stents    History of hysterectomy    History of breast biopsy  bilateral - benign    S/P thyroid biopsy  benign    History of cataract surgery  bilateral eyes        FAMILY HISTORY:  Lung cancer  mother    Family history of chronic renal failure (Sibling)  sister  from renal failure @ age 35    Family history of myocardial infarction (Sibling)  sister        SOCIAL HISTORY:    [ ] Non-smoker  [ ] Smoker  [ ] Alcohol      REVIEW OF SYSTEMS:  CONSTITUTIONAL: No fever, weight loss, or fatigue  EYES: No eye pain, visual disturbances, or discharge  ENMT:  No difficulty hearing, tinnitus, vertigo; No sinus or throat pain  NECK: No pain or stiffness  RESPIRATORY: No cough, wheezing, chills or hemoptysis; No Shortness of Breath  CARDIOVASCULAR: No chest pain, palpitations, passing out, dizziness, or leg swelling  GASTROINTESTINAL: No abdominal or epigastric pain. No nausea, vomiting, or hematemesis; No diarrhea or constipation. No melena or hematochezia.  GENITOURINARY: No dysuria, frequency, hematuria, or incontinence  NEUROLOGICAL: No headaches, memory loss, loss of strength, numbness, or tremors  SKIN: No itching, burning, rashes, or lesions   LYMPH Nodes: No enlarged glands  ENDOCRINE: No heat or cold intolerance; No hair loss  MUSCULOSKELETAL: No joint pain or swelling; No muscle, back, or extremity pain  PSYCHIATRIC: No depression, anxiety, mood swings, or difficulty sleeping  HEME/LYMPH: No easy bruising, or bleeding gums  ALLERY AND IMMUNOLOGIC: No hives or eczema	    [ ] All others negative	  [ ] Unable to obtain    PHYSICAL EXAM:  T(C): 36.7 (21 @ 05:54), Max: 37.1 (21 @ 15:18)  HR: 77 (21 @ 05:54) (67 - 95)  BP: 136/67 (21 @ 05:54) (136/67 - 197/77)  RR: 18 (21 @ 05:54) (18 - 18)  SpO2: 99% (21 @ 05:54) (98% - 99%)  Wt(kg): --  I&O's Summary      Appearance: Normal	  HEENT:   Normal oral mucosa, PERRL, EOMI	  Lymphatic: No lymphadenopathy  Cardiovascular: Normal S1 S2, No JVD, No murmurs, No edema  Respiratory: Lungs clear to auscultation	  Psychiatry: A & O x 3, Mood & affect appropriate  Gastrointestinal:  Soft, Non-tender, + BS	  Skin: No rashes, No ecchymoses, No cyanosis	  Neurologic: Non-focal  Extremities: Normal range of motion, No clubbing, cyanosis or edema  Vascular: Peripheral pulses palpable 2+ bilaterally        LABS:	 	    CBC Full  -  ( 2021 07:41 )  WBC Count : 5.07 K/uL  Hemoglobin : 10.6 g/dL  Hematocrit : 33.5 %  Platelet Count - Automated : 194 K/uL  Mean Cell Volume : 90.1 fL  Mean Cell Hemoglobin : 28.5 pg  Mean Cell Hemoglobin Concentration : 31.6 gm/dL  Auto Neutrophil # : 2.79 K/uL  Auto Lymphocyte # : 1.54 K/uL  Auto Monocyte # : 0.60 K/uL  Auto Eosinophil # : 0.12 K/uL  Auto Basophil # : 0.01 K/uL  Auto Neutrophil % : 55.0 %  Auto Lymphocyte % : 30.4 %  Auto Monocyte % : 11.8 %  Auto Eosinophil % : 2.4 %  Auto Basophil % : 0.2 %        139  |  103  |  46<H>  ----------------------------<  145<H>  5.2   |  23  |  2.43<H>      141  |  105  |  44<H>  ----------------------------<  74  4.8   |  26  |  2.41<H>    Ca    10.2      2021 07:41  Ca    10.6<H>      2021 10:24  Phos  3.8       Phos  3.4       Mg     2.5       Mg     2.5         TPro  6.9  /  Alb  3.5  /  TBili  0.5  /  DBili  x   /  AST  25  /  ALT  23  /  AlkPhos  110    TPro  7.3  /  Alb  4.2  /  TBili  0.4  /  DBili  x   /  AST  23  /  ALT  25  /  AlkPhos  122<H>        proBNP: Serum Pro-Brain Natriuretic Peptide: 303 pg/mL ( @ 07:41)  Serum Pro-Brain Natriuretic Peptide: 295 pg/mL ( @ 10:24)        < from: Xray Chest 1 View- PORTABLE-Urgent (Xray Chest 1 View- PORTABLE-Urgent .) (21 @ 11:04) >  COMPARISON:  2013      IMPRESSION:  Clear lungs.    < end of copied text >     Date of Admission: 21     CHIEF COMPLAINT: SOB, LE edema     HISTORY OF PRESENT ILLNESS:    71 y/p female with PMHX of HTN, DM II, HLD, PAD s/p RLE angiogram, CKD comes in with complaints of worsening SOB, LE edema, abdominal distension.   CXR shows clear lungs, LE dopplers show no DVT, CT head unremarkable. Labs significant for BUN/Cr 46/2.43, K 5.2, proBNP 303.       Allergies    No Known Allergies    Intolerances    	    MEDICATIONS:  aspirin enteric coated 81 milliGRAM(s) Oral daily  carvedilol 6.25 milliGRAM(s) Oral every 12 hours  furosemide   Injectable 40 milliGRAM(s) IV Push two times a day  heparin   Injectable 5000 Unit(s) SubCutaneous every 8 hours  hydrALAZINE 100 milliGRAM(s) Oral three times a day        acetaminophen   Tablet .. 650 milliGRAM(s) Oral every 6 hours PRN  gabapentin 300 milliGRAM(s) Oral three times a day  LORazepam     Tablet 1 milliGRAM(s) Oral every 2 hours PRN  LORazepam     Tablet 1 milliGRAM(s) Oral every 1 hour PRN      allopurinol 100 milliGRAM(s) Oral daily  atorvastatin 80 milliGRAM(s) Oral at bedtime  dextrose 40% Gel 15 Gram(s) Oral once  dextrose 50% Injectable 25 Gram(s) IV Push once  dextrose 50% Injectable 12.5 Gram(s) IV Push once  dextrose 50% Injectable 25 Gram(s) IV Push once  glucagon  Injectable 1 milliGRAM(s) IntraMuscular once  insulin lispro (ADMELOG) corrective regimen sliding scale   SubCutaneous three times a day before meals  insulin lispro (ADMELOG) corrective regimen sliding scale   SubCutaneous at bedtime  insulin NPH human recombinant 20 Unit(s) SubCutaneous two times a day    dextrose 5%. 1000 milliLiter(s) IV Continuous <Continuous>  dextrose 5%. 1000 milliLiter(s) IV Continuous <Continuous>  ketotifen 0.025% Ophthalmic Solution 1 Drop(s) Both EYES two times a day PRN  multivitamin 1 Tablet(s) Oral daily      PAST MEDICAL & SURGICAL HISTORY:  CKD (chronic kidney disease)    Diabetes    HLD (hyperlipidemia)    HTN (hypertension)    PVD (peripheral vascular disease)    CHF (congestive heart failure)    Peripheral neuropathy    History of angioplasty of peripheral vessel  RLE - no stents    History of hysterectomy    History of breast biopsy  bilateral - benign    S/P thyroid biopsy  benign    History of cataract surgery  bilateral eyes        FAMILY HISTORY:  Lung cancer  mother    Family history of chronic renal failure (Sibling)  sister  from renal failure @ age 35    Family history of myocardial infarction (Sibling)  sister        SOCIAL HISTORY:    [ ] Non-smoker  [ ] Smoker  [ ] Alcohol      REVIEW OF SYSTEMS:  CONSTITUTIONAL: No fever, weight loss, or fatigue  EYES: No eye pain, visual disturbances, or discharge  ENMT:  No difficulty hearing, tinnitus, vertigo; No sinus or throat pain  NECK: No pain or stiffness  RESPIRATORY: No cough, wheezing, chills or hemoptysis; No Shortness of Breath  CARDIOVASCULAR: No chest pain, palpitations, passing out, dizziness, or leg swelling  GASTROINTESTINAL: No abdominal or epigastric pain. No nausea, vomiting, or hematemesis; No diarrhea or constipation. No melena or hematochezia.  GENITOURINARY: No dysuria, frequency, hematuria, or incontinence  NEUROLOGICAL: No headaches, memory loss, loss of strength, numbness, or tremors  SKIN: No itching, burning, rashes, or lesions   LYMPH Nodes: No enlarged glands  ENDOCRINE: No heat or cold intolerance; No hair loss  MUSCULOSKELETAL: No joint pain or swelling; No muscle, back, or extremity pain  PSYCHIATRIC: No depression, anxiety, mood swings, or difficulty sleeping  HEME/LYMPH: No easy bruising, or bleeding gums  ALLERY AND IMMUNOLOGIC: No hives or eczema	    [ ] All others negative	  [ ] Unable to obtain    PHYSICAL EXAM:  T(C): 36.7 (21 @ 05:54), Max: 37.1 (21 @ 15:18)  HR: 77 (21 @ 05:54) (67 - 95)  BP: 136/67 (21 @ 05:54) (136/67 - 197/77)  RR: 18 (21 @ 05:54) (18 - 18)  SpO2: 99% (21 @ 05:54) (98% - 99%)  Wt(kg): --  I&O's Summary      Appearance: Normal	  HEENT:   Normal oral mucosa, PERRL, EOMI	  Lymphatic: No lymphadenopathy  Cardiovascular: Normal S1 S2, No JVD, No murmurs, No edema  Respiratory: Lungs clear to auscultation	  Psychiatry: A & O x 3, Mood & affect appropriate  Gastrointestinal:  Soft, Non-tender, + BS	  Skin: No rashes, No ecchymoses, No cyanosis	  Neurologic: Non-focal  Extremities: Normal range of motion, No clubbing, cyanosis or edema  Vascular: Peripheral pulses palpable 2+ bilaterally        LABS:	 	    CBC Full  -  ( 2021 07:41 )  WBC Count : 5.07 K/uL  Hemoglobin : 10.6 g/dL  Hematocrit : 33.5 %  Platelet Count - Automated : 194 K/uL  Mean Cell Volume : 90.1 fL  Mean Cell Hemoglobin : 28.5 pg  Mean Cell Hemoglobin Concentration : 31.6 gm/dL  Auto Neutrophil # : 2.79 K/uL  Auto Lymphocyte # : 1.54 K/uL  Auto Monocyte # : 0.60 K/uL  Auto Eosinophil # : 0.12 K/uL  Auto Basophil # : 0.01 K/uL  Auto Neutrophil % : 55.0 %  Auto Lymphocyte % : 30.4 %  Auto Monocyte % : 11.8 %  Auto Eosinophil % : 2.4 %  Auto Basophil % : 0.2 %        139  |  103  |  46<H>  ----------------------------<  145<H>  5.2   |  23  |  2.43<H>      141  |  105  |  44<H>  ----------------------------<  74  4.8   |  26  |  2.41<H>    Ca    10.2      2021 07:41  Ca    10.6<H>      2021 10:24  Phos  3.8       Phos  3.4       Mg     2.5       Mg     2.5         TPro  6.9  /  Alb  3.5  /  TBili  0.5  /  DBili  x   /  AST  25  /  ALT  23  /  AlkPhos  110    TPro  7.3  /  Alb  4.2  /  TBili  0.4  /  DBili  x   /  AST  23  /  ALT  25  /  AlkPhos  122<H>        proBNP: Serum Pro-Brain Natriuretic Peptide: 303 pg/mL ( @ 07:41)  Serum Pro-Brain Natriuretic Peptide: 295 pg/mL ( @ 10:24)        < from: Xray Chest 1 View- PORTABLE-Urgent (Xray Chest 1 View- PORTABLE-Urgent .) (21 @ 11:04) >  COMPARISON:  2013      IMPRESSION:  Clear lungs.    < end of copied text >     Date of Admission: 21     CHIEF COMPLAINT: SOB, LE edema     HISTORY OF PRESENT ILLNESS:    71 y/p female with PMHX of HTN, DM II, HLD, PAD s/p RLE angiogram, CKD comes in with complaints of worsening SOB, LE edema, abdominal distension.   CXR shows clear lungs, LE dopplers show no DVT, CT head unremarkable. Labs significant for BUN/Cr 46/2.43, K 5.2, proBNP 303. She states she was recently told she had a weak heart and has been placed on oral diuretics, but with minimal relief. She admits to 4 pillow orthopnea, BREEN w/ minimal exertion. No PND, CP, palpitations, dizziness. She has had a stress test with her cardiologist Dr. Alas a few months ago, but unclear of the results. Admits to drinking 1 pint of vodka most weekends. Non smoker, no other drug use.       Allergies    No Known Allergies    Intolerances    	    MEDICATIONS:  aspirin enteric coated 81 milliGRAM(s) Oral daily  carvedilol 6.25 milliGRAM(s) Oral every 12 hours  furosemide   Injectable 40 milliGRAM(s) IV Push two times a day  heparin   Injectable 5000 Unit(s) SubCutaneous every 8 hours  hydrALAZINE 100 milliGRAM(s) Oral three times a day        acetaminophen   Tablet .. 650 milliGRAM(s) Oral every 6 hours PRN  gabapentin 300 milliGRAM(s) Oral three times a day  LORazepam     Tablet 1 milliGRAM(s) Oral every 2 hours PRN  LORazepam     Tablet 1 milliGRAM(s) Oral every 1 hour PRN      allopurinol 100 milliGRAM(s) Oral daily  atorvastatin 80 milliGRAM(s) Oral at bedtime  dextrose 40% Gel 15 Gram(s) Oral once  dextrose 50% Injectable 25 Gram(s) IV Push once  dextrose 50% Injectable 12.5 Gram(s) IV Push once  dextrose 50% Injectable 25 Gram(s) IV Push once  glucagon  Injectable 1 milliGRAM(s) IntraMuscular once  insulin lispro (ADMELOG) corrective regimen sliding scale   SubCutaneous three times a day before meals  insulin lispro (ADMELOG) corrective regimen sliding scale   SubCutaneous at bedtime  insulin NPH human recombinant 20 Unit(s) SubCutaneous two times a day    dextrose 5%. 1000 milliLiter(s) IV Continuous <Continuous>  dextrose 5%. 1000 milliLiter(s) IV Continuous <Continuous>  ketotifen 0.025% Ophthalmic Solution 1 Drop(s) Both EYES two times a day PRN  multivitamin 1 Tablet(s) Oral daily      PAST MEDICAL & SURGICAL HISTORY:  CKD (chronic kidney disease)    Diabetes    HLD (hyperlipidemia)    HTN (hypertension)    PVD (peripheral vascular disease)    CHF (congestive heart failure)    Peripheral neuropathy    History of angioplasty of peripheral vessel  RLE - no stents    History of hysterectomy    History of breast biopsy  bilateral - benign    S/P thyroid biopsy  benign    History of cataract surgery  bilateral eyes        FAMILY HISTORY:  Lung cancer  mother    Family history of chronic renal failure (Sibling)  sister  from renal failure @ age 35    Family history of myocardial infarction (Sibling)  sister        SOCIAL HISTORY:    [ x] Non-smoker  [ ] Smoker  [x ] Alcohol      REVIEW OF SYSTEMS:  CONSTITUTIONAL: No fever, weight loss, or fatigue  EYES: No eye pain, visual disturbances, or discharge  ENMT:  No difficulty hearing, tinnitus, vertigo; No sinus or throat pain  NECK: No pain or stiffness  RESPIRATORY: No cough, wheezing, chills or hemoptysis; admits to shortness of Breath  CARDIOVASCULAR: No chest pain, palpitations, passing out, dizziness, has leg swelling  GASTROINTESTINAL: No abdominal or epigastric pain. No nausea, vomiting, or hematemesis; No diarrhea or constipation. No melena or hematochezia.  GENITOURINARY: No dysuria, frequency, hematuria, or incontinence  NEUROLOGICAL: No headaches, memory loss, loss of strength, numbness, or tremors  SKIN: No itching, burning, rashes, or lesions   LYMPH Nodes: No enlarged glands  ENDOCRINE: No heat or cold intolerance; No hair loss  MUSCULOSKELETAL: No joint pain or swelling; No muscle, back, or extremity pain  PSYCHIATRIC: No depression, anxiety, mood swings, or difficulty sleeping  HEME/LYMPH: No easy bruising, or bleeding gums  ALLERY AND IMMUNOLOGIC: No hives or eczema	    [ ] All others negative	  [ ] Unable to obtain    PHYSICAL EXAM:  T(C): 36.7 (21 @ 05:54), Max: 37.1 (21 @ 15:18)  HR: 77 (21 @ 05:54) (67 - 95)  BP: 136/67 (21 @ 05:54) (136/67 - 197/77)  RR: 18 (21 @ 05:54) (18 - 18)  SpO2: 99% (-21 @ 05:54) (98% - 99%)  Wt(kg): --  I&O's Summary      Appearance: Normal	  HEENT:   Normal oral mucosa, PERRL, EOMI	  Lymphatic: No lymphadenopathy  Cardiovascular: Normal S1 S2, difficult to assess JVD, No murmurs, 1+ b/l LE edema. Warm b/l.   Respiratory: Lungs clear to auscultation	  Psychiatry: A & O x 3, Mood & affect appropriate  Gastrointestinal:  Soft, Non-tender, + BS	  Skin: No rashes, No ecchymoses, No cyanosis	  Neurologic: Non-focal  Extremities: Normal range of motion, No clubbing, cyanosis   Vascular: Peripheral pulses palpable 2+ bilaterally        LABS:	 	    CBC Full  -  ( 2021 07:41 )  WBC Count : 5.07 K/uL  Hemoglobin : 10.6 g/dL  Hematocrit : 33.5 %  Platelet Count - Automated : 194 K/uL  Mean Cell Volume : 90.1 fL  Mean Cell Hemoglobin : 28.5 pg  Mean Cell Hemoglobin Concentration : 31.6 gm/dL  Auto Neutrophil # : 2.79 K/uL  Auto Lymphocyte # : 1.54 K/uL  Auto Monocyte # : 0.60 K/uL  Auto Eosinophil # : 0.12 K/uL  Auto Basophil # : 0.01 K/uL  Auto Neutrophil % : 55.0 %  Auto Lymphocyte % : 30.4 %  Auto Monocyte % : 11.8 %  Auto Eosinophil % : 2.4 %  Auto Basophil % : 0.2 %        139  |  103  |  46<H>  ----------------------------<  145<H>  5.2   |  23  |  2.43<H>      141  |  105  |  44<H>  ----------------------------<  74  4.8   |  26  |  2.41<H>    Ca    10.2      2021 07:41  Ca    10.6<H>      2021 10:24  Phos  3.8       Phos  3.4       Mg     2.5       Mg     2.5         TPro  6.9  /  Alb  3.5  /  TBili  0.5  /  DBili  x   /  AST  25  /  ALT  23  /  AlkPhos  110    TPro  7.3  /  Alb  4.2  /  TBili  0.4  /  DBili  x   /  AST  23  /  ALT  25  /  AlkPhos  122<H>        proBNP: Serum Pro-Brain Natriuretic Peptide: 303 pg/mL ( @ 07:41)  Serum Pro-Brain Natriuretic Peptide: 295 pg/mL ( @ 10:24)        < from: Xray Chest 1 View- PORTABLE-Urgent (Xray Chest 1 View- PORTABLE-Urgent .) (21 @ 11:04) >  COMPARISON:  2013      IMPRESSION:  Clear lungs.    < end of copied text >

## 2021-04-30 NOTE — PHYSICAL THERAPY INITIAL EVALUATION ADULT - PATIENT PROFILE REVIEW, REHAB EVAL
ACTIVITY: Ambulate with Assistance; spoke with RN Cheyenne Fischer prior to PT evaluation--> Pt OK for PT consult/yes

## 2021-04-30 NOTE — PHYSICAL THERAPY INITIAL EVALUATION ADULT - ADDITIONAL COMMENTS
Pt reports that she lives in a private house with her daughter and son in law with ~4 steps to enter; (+)bilateral handrails; bedroom/bathroom is on the first floor. Prior to hospital admission pt was completely independent and used no assistive device with ambulation. Pt denies any recent falls.    Pt left comfortable in bed, NAD, all lines intact, all precautions maintained, with call bell in reach, and RN aware of PT evlauation. Pt reports that she lives in a private house with her daughter and son in law with ~4 steps to enter; (+)bilateral handrails; bedroom/bathroom is on the first floor. Prior to hospital admission pt was completely independent and used no assistive device with ambulation. Pt denies any recent falls.    Pt left comfortable in bed, NAD, all lines intact, all precautions maintained, with call bell in reach, and RN aware of PT evaluation.

## 2021-04-30 NOTE — PROGRESS NOTE ADULT - PROBLEM SELECTOR PLAN 2
- pain resolved  - Trops peaked at 66 and downtrending  - Tele without events    - Has recent stress test, called cards office, awaiting call back

## 2021-04-30 NOTE — PHYSICAL THERAPY INITIAL EVALUATION ADULT - PERTINENT HX OF CURRENT PROBLEM, REHAB EVAL
This is a 71F with history of DM2 with neuropathy, HTN, Recently diagnosed CKD, PAD s/p angiogram RLE (pt denies any stents, just "clearing out"), CKD, HLD, and Thyroid nodules (benign biopsy x2 as per patient) who presents to the hospital with complaints of worsening SOB, LE edema, orthopnea, weight gain, and increasing abdominal girth likely 2/2 CHF exacerbation

## 2021-04-30 NOTE — PROGRESS NOTE ADULT - SUBJECTIVE AND OBJECTIVE BOX
Patient is a 71y old  Female who presents with a chief complaint of Worsening SOB (30 Apr 2021 10:16)      SUBJECTIVE / OVERNIGHT EVENTS:  Patient seen and examined today.    MEDICATIONS  (STANDING):  allopurinol 100 milliGRAM(s) Oral daily  aspirin enteric coated 81 milliGRAM(s) Oral daily  atorvastatin 80 milliGRAM(s) Oral at bedtime  carvedilol 6.25 milliGRAM(s) Oral every 12 hours  dextrose 40% Gel 15 Gram(s) Oral once  dextrose 5%. 1000 milliLiter(s) (50 mL/Hr) IV Continuous <Continuous>  dextrose 5%. 1000 milliLiter(s) (100 mL/Hr) IV Continuous <Continuous>  dextrose 50% Injectable 25 Gram(s) IV Push once  dextrose 50% Injectable 12.5 Gram(s) IV Push once  dextrose 50% Injectable 25 Gram(s) IV Push once  furosemide   Injectable 40 milliGRAM(s) IV Push two times a day  gabapentin 300 milliGRAM(s) Oral three times a day  glucagon  Injectable 1 milliGRAM(s) IntraMuscular once  heparin   Injectable 5000 Unit(s) SubCutaneous every 8 hours  hydrALAZINE 100 milliGRAM(s) Oral three times a day  insulin lispro (ADMELOG) corrective regimen sliding scale   SubCutaneous three times a day before meals  insulin lispro (ADMELOG) corrective regimen sliding scale   SubCutaneous at bedtime  insulin NPH human recombinant 20 Unit(s) SubCutaneous two times a day  multivitamin 1 Tablet(s) Oral daily    MEDICATIONS  (PRN):  acetaminophen   Tablet .. 650 milliGRAM(s) Oral every 6 hours PRN Temp greater or equal to 38C (100.4F), Mild Pain (1 - 3), Moderate Pain (4 - 6)  ketotifen 0.025% Ophthalmic Solution 1 Drop(s) Both EYES two times a day PRN Allergic Conjunctivitis  LORazepam     Tablet 1 milliGRAM(s) Oral every 2 hours PRN CIWA-Ar score increase by 2 points and a total score of 7 or less  LORazepam     Tablet 1 milliGRAM(s) Oral every 1 hour PRN CIWA-Ar score 8 or greater      Vital Signs Last 24 Hrs  T(C): 36.7 (30 Apr 2021 12:30), Max: 37.1 (29 Apr 2021 15:18)  T(F): 98.1 (30 Apr 2021 12:30), Max: 98.7 (29 Apr 2021 15:18)  HR: 70 (30 Apr 2021 12:30) (67 - 95)  BP: 142/72 (30 Apr 2021 12:30) (136/67 - 197/77)  BP(mean): --  RR: 18 (30 Apr 2021 12:30) (18 - 18)  SpO2: 99% (30 Apr 2021 12:30) (98% - 99%)  CAPILLARY BLOOD GLUCOSE      POCT Blood Glucose.: 106 mg/dL (30 Apr 2021 12:36)  POCT Blood Glucose.: 146 mg/dL (30 Apr 2021 08:44)  POCT Blood Glucose.: 140 mg/dL (29 Apr 2021 22:34)  POCT Blood Glucose.: 140 mg/dL (29 Apr 2021 19:44)  POCT Blood Glucose.: 87 mg/dL (29 Apr 2021 17:36)    I&O's Summary      Constitutional: NAD, awake and alert  EYES: no scleral icterus   HEENT:  NCAT  Neck: Soft and supple, no thyromegaly   Respiratory: no respiratory distress, Breath sounds decreased in the bilateral bases  Cardiovascular: S1 and S2, regular rate. peripheral pulses strong in UEs   Gastrointestinal: Soft, nontender, nondistended. +BS  Extremities: bilateral pitting lower extremity edema, no calf tenderness, warm to touch  Psych: Alert and Oriented x3, normal mood, normal affect    LABS:                        10.6   5.07  )-----------( 194      ( 30 Apr 2021 07:41 )             33.5     04-30    139  |  103  |  46<H>  ----------------------------<  145<H>  5.2   |  23  |  2.43<H>    Ca    10.2      30 Apr 2021 07:41  Phos  3.8     04-30  Mg     2.5     04-30    TPro  6.9  /  Alb  3.5  /  TBili  0.5  /  DBili  x   /  AST  25  /  ALT  23  /  AlkPhos  110  04-30      CARDIAC MARKERS ( 30 Apr 2021 07:41 )  x     / x     / 133 U/L / x     / 2.2 ng/mL  CARDIAC MARKERS ( 30 Apr 2021 00:02 )  x     / x     / x     / x     / 2.4 ng/mL  CARDIAC MARKERS ( 29 Apr 2021 11:51 )  x     / x     / 161 U/L / x     / 2.9 ng/mL  CARDIAC MARKERS ( 29 Apr 2021 10:24 )  x     / x     / 162 U/L / x     / 3.0 ng/mL          RADIOLOGY & ADDITIONAL TESTS:    Imaging Personally Reviewed:    Consultant(s) Notes Reviewed:      Care Discussed with Consultants/Other Providers:

## 2021-04-30 NOTE — PROGRESS NOTE ADULT - PROBLEM SELECTOR PLAN 4
- intermittent double vision over the past month, intermittent, currently resolved  - CTH with no acute intracranial abnormalities  - f/u OP with Optho

## 2021-04-30 NOTE — CONSULT NOTE ADULT - PROBLEM SELECTOR RECOMMENDATION 9
Likely HFrEF w/ decompensation. Please get TTE/ stress test results from her cardiologist Dr. Alas.   As d/w Dr. Hu d/cd IV Lasix, started Lasix gtt 5 mg/hr. Started Isordil 10 mg po TID.   Continue Coreg 6.25 mg po BID.   Continue hydralazine 100 mg po TID.   Keep K 4.0-5.0 and mag 2.0.   Daily standing weights, and strict I/O.

## 2021-04-30 NOTE — PROGRESS NOTE ADULT - PROBLEM SELECTOR PLAN 7
- On NPH 25U BID and novolin R sliding scale (usually end up taking 5U BID with her NPH)  - for now will place on NPH 20U BID (80% of her home dosing), place on ISS, monitor FS qAC  - cont gabapentin for her neuropathy  - c/w atorvastatin, check fasting lipid profile

## 2021-05-01 LAB
ANION GAP SERPL CALC-SCNC: 12 MMOL/L — SIGNIFICANT CHANGE UP (ref 7–14)
BASOPHILS # BLD AUTO: 0.01 K/UL — SIGNIFICANT CHANGE UP (ref 0–0.2)
BASOPHILS NFR BLD AUTO: 0.2 % — SIGNIFICANT CHANGE UP (ref 0–2)
BUN SERPL-MCNC: 52 MG/DL — HIGH (ref 7–23)
CALCIUM SERPL-MCNC: 10.2 MG/DL — SIGNIFICANT CHANGE UP (ref 8.4–10.5)
CHLORIDE SERPL-SCNC: 103 MMOL/L — SIGNIFICANT CHANGE UP (ref 98–107)
CO2 SERPL-SCNC: 25 MMOL/L — SIGNIFICANT CHANGE UP (ref 22–31)
CREAT SERPL-MCNC: 2.84 MG/DL — HIGH (ref 0.5–1.3)
CULTURE RESULTS: SIGNIFICANT CHANGE UP
EOSINOPHIL # BLD AUTO: 0.12 K/UL — SIGNIFICANT CHANGE UP (ref 0–0.5)
EOSINOPHIL NFR BLD AUTO: 2.5 % — SIGNIFICANT CHANGE UP (ref 0–6)
GLUCOSE BLDC GLUCOMTR-MCNC: 165 MG/DL — HIGH (ref 70–99)
GLUCOSE BLDC GLUCOMTR-MCNC: 191 MG/DL — HIGH (ref 70–99)
GLUCOSE BLDC GLUCOMTR-MCNC: 205 MG/DL — HIGH (ref 70–99)
GLUCOSE BLDC GLUCOMTR-MCNC: 234 MG/DL — HIGH (ref 70–99)
GLUCOSE BLDC GLUCOMTR-MCNC: 255 MG/DL — HIGH (ref 70–99)
GLUCOSE SERPL-MCNC: 253 MG/DL — HIGH (ref 70–99)
HCT VFR BLD CALC: 32.9 % — LOW (ref 34.5–45)
HGB BLD-MCNC: 10.1 G/DL — LOW (ref 11.5–15.5)
IANC: 2.95 K/UL — SIGNIFICANT CHANGE UP (ref 1.5–8.5)
IMM GRANULOCYTES NFR BLD AUTO: 0.2 % — SIGNIFICANT CHANGE UP (ref 0–1.5)
LYMPHOCYTES # BLD AUTO: 1.11 K/UL — SIGNIFICANT CHANGE UP (ref 1–3.3)
LYMPHOCYTES # BLD AUTO: 23.1 % — SIGNIFICANT CHANGE UP (ref 13–44)
MAGNESIUM SERPL-MCNC: 2.4 MG/DL — SIGNIFICANT CHANGE UP (ref 1.6–2.6)
MCHC RBC-ENTMCNC: 28.1 PG — SIGNIFICANT CHANGE UP (ref 27–34)
MCHC RBC-ENTMCNC: 30.7 GM/DL — LOW (ref 32–36)
MCV RBC AUTO: 91.6 FL — SIGNIFICANT CHANGE UP (ref 80–100)
MONOCYTES # BLD AUTO: 0.6 K/UL — SIGNIFICANT CHANGE UP (ref 0–0.9)
MONOCYTES NFR BLD AUTO: 12.5 % — SIGNIFICANT CHANGE UP (ref 2–14)
NEUTROPHILS # BLD AUTO: 2.95 K/UL — SIGNIFICANT CHANGE UP (ref 1.8–7.4)
NEUTROPHILS NFR BLD AUTO: 61.5 % — SIGNIFICANT CHANGE UP (ref 43–77)
NRBC # BLD: 0 /100 WBCS — SIGNIFICANT CHANGE UP
NRBC # FLD: 0 K/UL — SIGNIFICANT CHANGE UP
NT-PROBNP SERPL-SCNC: 244 PG/ML — SIGNIFICANT CHANGE UP
PHOSPHATE SERPL-MCNC: 3.4 MG/DL — SIGNIFICANT CHANGE UP (ref 2.5–4.5)
PLATELET # BLD AUTO: 192 K/UL — SIGNIFICANT CHANGE UP (ref 150–400)
POTASSIUM SERPL-MCNC: 4.9 MMOL/L — SIGNIFICANT CHANGE UP (ref 3.5–5.3)
POTASSIUM SERPL-SCNC: 4.9 MMOL/L — SIGNIFICANT CHANGE UP (ref 3.5–5.3)
RBC # BLD: 3.59 M/UL — LOW (ref 3.8–5.2)
RBC # FLD: 15 % — HIGH (ref 10.3–14.5)
SODIUM SERPL-SCNC: 140 MMOL/L — SIGNIFICANT CHANGE UP (ref 135–145)
SPECIMEN SOURCE: SIGNIFICANT CHANGE UP
WBC # BLD: 4.8 K/UL — SIGNIFICANT CHANGE UP (ref 3.8–10.5)
WBC # FLD AUTO: 4.8 K/UL — SIGNIFICANT CHANGE UP (ref 3.8–10.5)

## 2021-05-01 PROCEDURE — 99233 SBSQ HOSP IP/OBS HIGH 50: CPT

## 2021-05-01 PROCEDURE — 99232 SBSQ HOSP IP/OBS MODERATE 35: CPT

## 2021-05-01 RX ADMIN — Medication 1: at 13:20

## 2021-05-01 RX ADMIN — CARVEDILOL PHOSPHATE 6.25 MILLIGRAM(S): 80 CAPSULE, EXTENDED RELEASE ORAL at 04:47

## 2021-05-01 RX ADMIN — GABAPENTIN 300 MILLIGRAM(S): 400 CAPSULE ORAL at 04:47

## 2021-05-01 RX ADMIN — ISOSORBIDE DINITRATE 10 MILLIGRAM(S): 5 TABLET ORAL at 18:24

## 2021-05-01 RX ADMIN — GABAPENTIN 300 MILLIGRAM(S): 400 CAPSULE ORAL at 13:20

## 2021-05-01 RX ADMIN — Medication 2: at 08:54

## 2021-05-01 RX ADMIN — Medication 100 MILLIGRAM(S): at 04:47

## 2021-05-01 RX ADMIN — Medication 100 MILLIGRAM(S): at 21:01

## 2021-05-01 RX ADMIN — ISOSORBIDE DINITRATE 10 MILLIGRAM(S): 5 TABLET ORAL at 13:19

## 2021-05-01 RX ADMIN — CARVEDILOL PHOSPHATE 6.25 MILLIGRAM(S): 80 CAPSULE, EXTENDED RELEASE ORAL at 18:24

## 2021-05-01 RX ADMIN — HEPARIN SODIUM 5000 UNIT(S): 5000 INJECTION INTRAVENOUS; SUBCUTANEOUS at 04:48

## 2021-05-01 RX ADMIN — Medication 100 MILLIGRAM(S): at 13:20

## 2021-05-01 RX ADMIN — Medication 1: at 18:22

## 2021-05-01 RX ADMIN — HUMAN INSULIN 20 UNIT(S): 100 INJECTION, SUSPENSION SUBCUTANEOUS at 04:55

## 2021-05-01 RX ADMIN — Medication 1 TABLET(S): at 13:19

## 2021-05-01 RX ADMIN — ISOSORBIDE DINITRATE 10 MILLIGRAM(S): 5 TABLET ORAL at 04:48

## 2021-05-01 RX ADMIN — ATORVASTATIN CALCIUM 80 MILLIGRAM(S): 80 TABLET, FILM COATED ORAL at 21:00

## 2021-05-01 RX ADMIN — HEPARIN SODIUM 5000 UNIT(S): 5000 INJECTION INTRAVENOUS; SUBCUTANEOUS at 21:01

## 2021-05-01 RX ADMIN — Medication 81 MILLIGRAM(S): at 13:20

## 2021-05-01 RX ADMIN — HUMAN INSULIN 20 UNIT(S): 100 INJECTION, SUSPENSION SUBCUTANEOUS at 18:23

## 2021-05-01 RX ADMIN — GABAPENTIN 300 MILLIGRAM(S): 400 CAPSULE ORAL at 21:01

## 2021-05-01 RX ADMIN — HEPARIN SODIUM 5000 UNIT(S): 5000 INJECTION INTRAVENOUS; SUBCUTANEOUS at 13:19

## 2021-05-01 RX ADMIN — Medication 100 MILLIGRAM(S): at 13:19

## 2021-05-01 NOTE — PROGRESS NOTE ADULT - SUBJECTIVE AND OBJECTIVE BOX
Spouse/Patient Patient is a 71y old  Female who presents with a chief complaint of Worsening SOB. (01 May 2021 11:58)      SUBJECTIVE / OVERNIGHT EVENTS:  Patient seen and examined this morning. She reports 3 BM overnight   SOB significantly improved, LE swelling improved     MEDICATIONS  (STANDING):  allopurinol 100 milliGRAM(s) Oral daily  aspirin enteric coated 81 milliGRAM(s) Oral daily  atorvastatin 80 milliGRAM(s) Oral at bedtime  carvedilol 6.25 milliGRAM(s) Oral every 12 hours  dextrose 40% Gel 15 Gram(s) Oral once  dextrose 5%. 1000 milliLiter(s) (50 mL/Hr) IV Continuous <Continuous>  dextrose 5%. 1000 milliLiter(s) (100 mL/Hr) IV Continuous <Continuous>  dextrose 50% Injectable 25 Gram(s) IV Push once  dextrose 50% Injectable 12.5 Gram(s) IV Push once  dextrose 50% Injectable 25 Gram(s) IV Push once  furosemide Infusion 5 mG/Hr (2.5 mL/Hr) IV Continuous <Continuous>  gabapentin 300 milliGRAM(s) Oral three times a day  glucagon  Injectable 1 milliGRAM(s) IntraMuscular once  heparin   Injectable 5000 Unit(s) SubCutaneous every 8 hours  hydrALAZINE 100 milliGRAM(s) Oral three times a day  insulin lispro (ADMELOG) corrective regimen sliding scale   SubCutaneous three times a day before meals  insulin lispro (ADMELOG) corrective regimen sliding scale   SubCutaneous at bedtime  insulin NPH human recombinant 20 Unit(s) SubCutaneous two times a day  isosorbide   dinitrate Tablet (ISORDIL) 10 milliGRAM(s) Oral three times a day  multivitamin 1 Tablet(s) Oral daily    MEDICATIONS  (PRN):  acetaminophen   Tablet .. 650 milliGRAM(s) Oral every 6 hours PRN Temp greater or equal to 38C (100.4F), Mild Pain (1 - 3), Moderate Pain (4 - 6)  ketotifen 0.025% Ophthalmic Solution 1 Drop(s) Both EYES two times a day PRN Allergic Conjunctivitis  LORazepam     Tablet 1 milliGRAM(s) Oral every 2 hours PRN CIWA-Ar score increase by 2 points and a total score of 7 or less  LORazepam     Tablet 1 milliGRAM(s) Oral every 1 hour PRN CIWA-Ar score 8 or greater      Vital Signs Last 24 Hrs  T(C): 36.6 (01 May 2021 12:30), Max: 36.8 (01 May 2021 08:30)  T(F): 97.8 (01 May 2021 12:30), Max: 98.2 (01 May 2021 08:30)  HR: 71 (01 May 2021 12:30) (68 - 82)  BP: 145/71 (01 May 2021 12:30) (132/73 - 163/80)  BP(mean): --  RR: 18 (01 May 2021 12:30) (17 - 18)  SpO2: 98% (01 May 2021 12:30) (97% - 99%)  CAPILLARY BLOOD GLUCOSE      POCT Blood Glucose.: 165 mg/dL (01 May 2021 12:39)  POCT Blood Glucose.: 205 mg/dL (01 May 2021 08:39)  POCT Blood Glucose.: 255 mg/dL (01 May 2021 04:52)  POCT Blood Glucose.: 217 mg/dL (30 Apr 2021 21:23)  POCT Blood Glucose.: 144 mg/dL (30 Apr 2021 17:25)    I&O's Summary    30 Apr 2021 07:01  -  01 May 2021 07:00  --------------------------------------------------------  IN: 100 mL / OUT: 400 mL / NET: -300 mL        Constitutional: NAD, awake and alert  EYES: PERRLA, normal sclera  HEENT:  NCAT, nares clear, no tonsillar exudates   Neck: Soft and supple , no thyromegaly   Respiratory: no respiratory distress, Breath sounds are clear bilaterally. No wheezing, rales or rhonchi  Cardiovascular: S1 and S2, regular rate and rhythm, no murmurs. peripheral pulses palpable x4  Gastrointestinal: Soft, nontender, nondistended. +BS  Extremities: No lower extremity edema, no calf tenderness, warm to touch  Neurological: 5/5 strength b/l upper and lower extremities.   Musculoskeletal: Normal ROM, no joint swelling.  Skin: No rashes, No erythema   Psych: Alert and Oriented x3, normal mood, normal affect    LABS:                        10.1   4.80  )-----------( 192      ( 01 May 2021 07:37 )             32.9     05-01    140  |  103  |  52<H>  ----------------------------<  253<H>  4.9   |  25  |  2.84<H>    Ca    10.2      01 May 2021 07:31  Phos  3.4     05-01  Mg     2.4     05-01    TPro  6.9  /  Alb  3.5  /  TBili  0.5  /  DBili  x   /  AST  25  /  ALT  23  /  AlkPhos  110  04-30      CARDIAC MARKERS ( 30 Apr 2021 07:41 )  x     / x     / 133 U/L / x     / 2.2 ng/mL  CARDIAC MARKERS ( 30 Apr 2021 00:02 )  x     / x     / x     / x     / 2.4 ng/mL          RADIOLOGY & ADDITIONAL TESTS:    Imaging Personally Reviewed:    Consultant(s) Notes Reviewed:      Care Discussed with Consultants/Other Providers:

## 2021-05-01 NOTE — PROGRESS NOTE ADULT - SUBJECTIVE AND OBJECTIVE BOX
Cardiology Progress Note    Interval: Pt resting comfortably in bed. Noted feeling much better this morning. Denied chest pain and SOB.    Tele: Sinus rhythm, PVC's    Medications:  acetaminophen   Tablet .. 650 milliGRAM(s) Oral every 6 hours PRN  allopurinol 100 milliGRAM(s) Oral daily  aspirin enteric coated 81 milliGRAM(s) Oral daily  atorvastatin 80 milliGRAM(s) Oral at bedtime  carvedilol 6.25 milliGRAM(s) Oral every 12 hours  dextrose 40% Gel 15 Gram(s) Oral once  dextrose 5%. 1000 milliLiter(s) IV Continuous <Continuous>  dextrose 5%. 1000 milliLiter(s) IV Continuous <Continuous>  dextrose 50% Injectable 25 Gram(s) IV Push once  dextrose 50% Injectable 12.5 Gram(s) IV Push once  dextrose 50% Injectable 25 Gram(s) IV Push once  furosemide Infusion 5 mG/Hr IV Continuous <Continuous>  gabapentin 300 milliGRAM(s) Oral three times a day  glucagon  Injectable 1 milliGRAM(s) IntraMuscular once  heparin   Injectable 5000 Unit(s) SubCutaneous every 8 hours  hydrALAZINE 100 milliGRAM(s) Oral three times a day  insulin lispro (ADMELOG) corrective regimen sliding scale   SubCutaneous three times a day before meals  insulin lispro (ADMELOG) corrective regimen sliding scale   SubCutaneous at bedtime  insulin NPH human recombinant 20 Unit(s) SubCutaneous two times a day  isosorbide   dinitrate Tablet (ISORDIL) 10 milliGRAM(s) Oral three times a day  ketotifen 0.025% Ophthalmic Solution 1 Drop(s) Both EYES two times a day PRN  LORazepam     Tablet 1 milliGRAM(s) Oral every 2 hours PRN  LORazepam     Tablet 1 milliGRAM(s) Oral every 1 hour PRN  multivitamin 1 Tablet(s) Oral daily      Review of Systems:  Constitutional: [ ] Fever [ ] Chills [ ] Fatigue [ ] Weight change   HEENT: [ ] Blurred vision [ ] Eye Pain [ ] Headache [ ] Runny nose [ ] Sore Throat   Respiratory: [ ] Cough [ ] Wheezing [ ] Shortness of breath  Cardiovascular: [ ] Chest Pain [ ] Palpitations [ ] BREEN [ ] PND [ ] Orthopnea  Gastrointestinal: [ ] Abdominal Pain [ ] Diarrhea [ ] Constipation [ ] Hemorrhoids [ ] Nausea [ ] Vomiting  Genitourinary: [ ] Nocturia [ ] Dysuria [ ] Incontinence  Extremities: [ ] Swelling [ ] Joint Pain  Neurologic: [ ] Focal deficit [ ] Paresthesias [ ] Syncope  Lymphatic: [ ] Swelling [ ] Lymphadenopathy   Skin: [ ] Rash [ ] Ecchymoses [ ] Wounds [ ] Lesions  Psychiatry: [ ] Depression [ ] Suicidal/Homicidal Ideation [ ] Anxiety [ ] Sleep Disturbances  [ ] 10 point review of systems is otherwise negative except as mentioned above            [ ]Unable to obtain    Vitals:  T(C): 36.8 (05-01-21 @ 08:30), Max: 36.8 (05-01-21 @ 08:30)  HR: 79 (05-01-21 @ 08:30) (68 - 82)  BP: 138/74 (05-01-21 @ 08:30) (132/73 - 163/80)  BP(mean): --  RR: 18 (05-01-21 @ 08:30) (17 - 18)  SpO2: 99% (05-01-21 @ 08:30) (97% - 99%)  Wt(kg): --  Daily     Daily   I&O's Summary    30 Apr 2021 07:01  -  01 May 2021 07:00  --------------------------------------------------------  IN: 100 mL / OUT: 400 mL / NET: -300 mL        Physical Exam:  Appearance: Normal	  HEENT:   Normal oral mucosa, PERRL, EOMI	  Lymphatic: No lymphadenopathy  Cardiovascular: Normal S1 S2, difficult to assess JVD, No murmurs, 1+ b/l LE edema. Warm b/l.   Respiratory: Lungs clear to auscultation	  Psychiatry: A & O x 3, Mood & affect appropriate  Gastrointestinal:  Soft, Non-tender, + BS	  Skin: No rashes, No ecchymoses, No cyanosis	  Neurologic: Non-focal  Extremities: Normal range of motion, No clubbing, cyanosis   Vascular: Peripheral pulses palpable 2+ bilaterally    Labs:                        10.1   4.80  )-----------( 192      ( 01 May 2021 07:37 )             32.9     05-01    140  |  103  |  52<H>  ----------------------------<  253<H>  4.9   |  25  |  2.84<H>    Ca    10.2      01 May 2021 07:31  Phos  3.4     05-01  Mg     2.4     05-01    TPro  6.9  /  Alb  3.5  /  TBili  0.5  /  DBili  x   /  AST  25  /  ALT  23  /  AlkPhos  110  04-30      CARDIAC MARKERS ( 30 Apr 2021 07:41 )  x     / x     / 133 U/L / x     / 2.2 ng/mL  CARDIAC MARKERS ( 30 Apr 2021 00:02 )  x     / x     / x     / x     / 2.4 ng/mL  CARDIAC MARKERS ( 29 Apr 2021 11:51 )  x     / x     / 161 U/L / x     / 2.9 ng/mL  CARDIAC MARKERS ( 29 Apr 2021 10:24 )  x     / x     / 162 U/L / x     / 3.0 ng/mL      Serum Pro-Brain Natriuretic Peptide: 244 pg/mL (05-01 @ 07:31)  Serum Pro-Brain Natriuretic Peptide: 303 pg/mL (04-30 @ 07:41)  Serum Pro-Brain Natriuretic Peptide: 295 pg/mL (04-29 @ 10:24)          New results/imaging:

## 2021-05-01 NOTE — DIETITIAN INITIAL EVALUATION ADULT. - PROBLEM SELECTOR PLAN 4
- c/w new intermittent double vision over the past month, intermittent when watching TV, otherwise not present, no other eye complaints, no HA  - seems to have intact peripheral vision on exam currently, EOMI, PERRL  - Will check CT for further eval, consider ophthalmology eval in AM

## 2021-05-01 NOTE — DIETITIAN INITIAL EVALUATION ADULT. - PROBLEM SELECTOR PLAN 7
- On NPH 25U BID and novolin R sliding scale (usually end up taking 5U BID with her NPH)  - for now will place on NPH 20U BID (80% of her home dosing), place on ISS, monitor FS qAC  - cont gabapentin for her neuropathy  - c/w atorvastation, check fasting lipid profile

## 2021-05-01 NOTE — DIETITIAN INITIAL EVALUATION ADULT. - PERTINENT LABORATORY DATA
05-01 Na 140 mmol/L Glu 253 mg/dL<H> K+ 4.9 mmol/L Cr 2.84 mg/dL<H> BUN 52 mg/dL<H> Phos 3.4 mg/dL  05-01 @ 08:39 POCT 205 mg/dL  05-01 @ 04:52 POCT 255 mg/dL  04-30 @ 21:23 POCT 217 mg/dL  04-30 @ 17:25 POCT 144 mg/dL  04-30 @ 12:36 POCT 106 mg/dL

## 2021-05-01 NOTE — SWALLOW BEDSIDE ASSESSMENT ADULT - SWALLOW EVAL: DIAGNOSIS
1. Patient presents with functional oral phase of swallow with puree, regular solids and thin liquids characterized by adequate bolus mastication, A-P transit and oral clearance. 2. Patient presents with functional pharyngeal phase of swallow with puree, regular solids and thin liquids characterized by adequate pharyngeal swallow trigger and present hyolaryngeal elevation upon digital palpation. No overt signs or symptoms of aspiration/penetration noted.

## 2021-05-01 NOTE — DIETITIAN INITIAL EVALUATION ADULT. - OTHER INFO
Pt was advanced from pureed foods to regular consistency 5/1 (After 2 day admission). Swallow recs 5/1: regular, thin. PO at breakfast was cereal and milk. c/o persistent abdominal pressure. +diarrhea-pt moved to contact room for Cdiff r/o. Stated  pounds; unintentional wt gain in setting of HF exacerbation.    Heart failure nutrition therapy provided. Discussed daily weights, 2 gm Na restriction,  nutrition label reading, no added salt to foods, and limiting eating out. Discussed strategies to order foods from [employer] that are lower in Na.

## 2021-05-01 NOTE — DIETITIAN INITIAL EVALUATION ADULT. - PERTINENT MEDS FT
MEDICATIONS  (STANDING):  allopurinol 100 milliGRAM(s) Oral daily  aspirin enteric coated 81 milliGRAM(s) Oral daily  atorvastatin 80 milliGRAM(s) Oral at bedtime  carvedilol 6.25 milliGRAM(s) Oral every 12 hours  dextrose 40% Gel 15 Gram(s) Oral once  dextrose 5%. 1000 milliLiter(s) (50 mL/Hr) IV Continuous <Continuous>  dextrose 5%. 1000 milliLiter(s) (100 mL/Hr) IV Continuous <Continuous>  dextrose 50% Injectable 25 Gram(s) IV Push once  dextrose 50% Injectable 12.5 Gram(s) IV Push once  dextrose 50% Injectable 25 Gram(s) IV Push once  furosemide Infusion 5 mG/Hr (2.5 mL/Hr) IV Continuous <Continuous>  gabapentin 300 milliGRAM(s) Oral three times a day  glucagon  Injectable 1 milliGRAM(s) IntraMuscular once  heparin   Injectable 5000 Unit(s) SubCutaneous every 8 hours  hydrALAZINE 100 milliGRAM(s) Oral three times a day  insulin lispro (ADMELOG) corrective regimen sliding scale   SubCutaneous three times a day before meals  insulin lispro (ADMELOG) corrective regimen sliding scale   SubCutaneous at bedtime  insulin NPH human recombinant 20 Unit(s) SubCutaneous two times a day  isosorbide   dinitrate Tablet (ISORDIL) 10 milliGRAM(s) Oral three times a day  multivitamin 1 Tablet(s) Oral daily

## 2021-05-01 NOTE — PROGRESS NOTE ADULT - PROBLEM SELECTOR PLAN 4
- intermittent double vision over the past month, intermittent, currently resolved  - CTH with no acute intracranial abnormalities  - f/u OP with Optho, has appointment

## 2021-05-01 NOTE — PROGRESS NOTE ADULT - PROBLEM SELECTOR PLAN 1
- Presenting with worsening SOB, orthopnea, b/l LE edema, weight gain, increasing abdominal girth, here with bibasilar crackles and JVP concerning for CHF exacerbation  - c/w lasix gtt  - TTE pending   - 1.5L fluid restriction, daily weights, I/O   - heart failure consult appreciated

## 2021-05-01 NOTE — PROGRESS NOTE ADULT - PROBLEM SELECTOR PLAN 5
- c/o new onset dysphagia, noted over the past few weeks, mostly to large pills, occasionally to food  - SLP -> regular and thin

## 2021-05-01 NOTE — DIETITIAN INITIAL EVALUATION ADULT. - ORAL INTAKE PTA/DIET HISTORY
Pt states she has only been eating one meal daily for the past month due to abdominal pressure and early satiety. Pt mostly orders from restaurants and wander because she is too tired/SOB. From diet recall, pt is trying to reduce sodium intake by reducing fried foods and deli meats.

## 2021-05-01 NOTE — DIETITIAN INITIAL EVALUATION ADULT. - PROBLEM SELECTOR PLAN 5
- c/o new onset dysphagia, noted over the past few weeks, mostly to large pills, occasionally to food  - here passed dysphagia screen, will place on dysphagia 3 diet with honey thick liquids, obtain S&S eval in AM  - Check Thyroid US to eval her thyroid nodules, if present then might need further evaluation

## 2021-05-01 NOTE — PROGRESS NOTE ADULT - ASSESSMENT
71F PMH of DM2 with neuropathy, HTN, Recently diagnosed CKD, PAD s/p angiogram RLE (pt denies any stents, just "clearing out"), CKD, HLD, and Thyroid nodules (benign biopsy x2 as per patient) who presents to the hospital with complaints of worsening SOB, LE edema, orthopnea, weight gain, and increasing abdominal girth likely 2/2 CHF exacerbation. Also with complaints of new intermittent double vision (occasionally when watching TV, does not occur otherwise) and new dysphagia intermittently.  0

## 2021-05-01 NOTE — DIETITIAN INITIAL EVALUATION ADULT. - PROBLEM SELECTOR PLAN 3
- Patient reports worsening renal function as outpatient but unsure on function/level (states it was in the 2s, now 1s but not sure if that is the creatinine level or the function level)  - Here with elevated BUN/Cr placing her at CKD4  - Would monitor BUN/Cr, check UA, urine sodium/creatinine  - Check renal US  - Consider renal eval in AM

## 2021-05-01 NOTE — SWALLOW BEDSIDE ASSESSMENT ADULT - COMMENTS
H&P 4/29/2021: This is a 71F with history of DM2 with neuropathy, HTN, Recently diagnosed CKD, PAD s/p angiogram RLE (pt denies any stents, just "clearing out"), CKD, HLD, and Thyroid nodules (benign biopsy x2 as per patient) who presents to the hospital with complaints of worsening SOB, LE edema, orthopnea, weight gain, and increasing abdominal girth likely 2/2 CHF exacerbation. Also with complaints of new intermittent double vision (occasionally when watching TV, does not occur otherwise) and new dysphagia intermittently.    CXR 4/29/2021: Clear lungs.    Consult received and chart reviewed. Patient seen for clinical swallow evaluation; patient alert and oriented x 4 and is able to follow commands and make wants/needs known. Patient reporting having thyroid nodules that are benign. Patient reporting disliking the puree foods that she’s receiving.    Results and recommendations discussed with patient and RN on unit. Called out to team. PA verbalized understanding.

## 2021-05-01 NOTE — PROGRESS NOTE ADULT - PROBLEM SELECTOR PLAN 1
Likely HFrEF w/ decompensation.   Please get TTE/ stress test results from her cardiologist Dr. Alas.   Started Lasix gtt 5 mg/hr. Started Isordil 10 mg po TID.   BP improved to 130s/80s.  Continue Coreg 6.25 mg po BID.   Continue hydralazine 100 mg po TID.   Keep K 4.0-5.0 and mag 2.0.   Daily standing weights, and strict I/O.

## 2021-05-01 NOTE — DIETITIAN INITIAL EVALUATION ADULT. - PROBLEM SELECTOR PLAN 2
- Pressure like chest pain earlier today, now resolved, no chest pain in hospital at rest nor when ambulating  - Trops elevated but stable, CK/CKMB negative, trops likely elevated 2/2 CKD and demand from her CHF, would trend for now, monitor on telemetry, check TTE  - Cardiology eval as per primary team  - Has recent stress test and TTE but unsure on results, will have to see if we can obtain the results from patient's cardiologist in AM

## 2021-05-01 NOTE — DIETITIAN INITIAL EVALUATION ADULT. - PROBLEM SELECTOR PLAN 1
- Patient with worsening SOB, orthopnea, b/l LE edema, weight gain, increasing abdominal girth, here with bibasilar crackles and mildly elevated JVD concerning for CHF exacerbation despite low proBNP (might be falsely low 2/2 BMI)  - Will c/w IV lasix diuresis BID, check TTE, telemetry monitoring  - 1.5L fluid restriction, daily weights, I/O   - Recheck proBNP in AM, trend trops (likely elevated 2/2 CKD and demand from CHF exacerbation)  - Cardiology eval in AM as per primary team  - Will check b/l LE duplex scan

## 2021-05-01 NOTE — PROGRESS NOTE ADULT - PROBLEM SELECTOR PLAN 2
- pain resolved  - Trops peaked at 66 and downtrending  - Tele without events    - Has recent stress test at OP, called cards office on 4/30 with no call back will need to reassess on Monday

## 2021-05-02 DIAGNOSIS — N17.9 ACUTE KIDNEY FAILURE, UNSPECIFIED: ICD-10-CM

## 2021-05-02 LAB
ANION GAP SERPL CALC-SCNC: 13 MMOL/L — SIGNIFICANT CHANGE UP (ref 7–14)
APPEARANCE UR: CLEAR — SIGNIFICANT CHANGE UP
BACTERIA # UR AUTO: ABNORMAL
BASOPHILS # BLD AUTO: 0.02 K/UL — SIGNIFICANT CHANGE UP (ref 0–0.2)
BASOPHILS NFR BLD AUTO: 0.4 % — SIGNIFICANT CHANGE UP (ref 0–2)
BILIRUB UR-MCNC: NEGATIVE — SIGNIFICANT CHANGE UP
BUN SERPL-MCNC: 55 MG/DL — HIGH (ref 7–23)
C DIFF BY PCR RESULT: SIGNIFICANT CHANGE UP
C DIFF TOX GENS STL QL NAA+PROBE: SIGNIFICANT CHANGE UP
CALCIUM SERPL-MCNC: 10 MG/DL — SIGNIFICANT CHANGE UP (ref 8.4–10.5)
CHLORIDE SERPL-SCNC: 105 MMOL/L — SIGNIFICANT CHANGE UP (ref 98–107)
CHLORIDE UR-SCNC: 71 MMOL/L — SIGNIFICANT CHANGE UP
CO2 SERPL-SCNC: 22 MMOL/L — SIGNIFICANT CHANGE UP (ref 22–31)
COLOR SPEC: YELLOW — SIGNIFICANT CHANGE UP
CREAT ?TM UR-MCNC: 140 MG/DL — SIGNIFICANT CHANGE UP
CREAT SERPL-MCNC: 3.27 MG/DL — HIGH (ref 0.5–1.3)
DIFF PNL FLD: NEGATIVE — SIGNIFICANT CHANGE UP
EOSINOPHIL # BLD AUTO: 0.12 K/UL — SIGNIFICANT CHANGE UP (ref 0–0.5)
EOSINOPHIL NFR BLD AUTO: 2.4 % — SIGNIFICANT CHANGE UP (ref 0–6)
EPI CELLS # UR: 1 /HPF — SIGNIFICANT CHANGE UP (ref 0–5)
GLUCOSE BLDC GLUCOMTR-MCNC: 164 MG/DL — HIGH (ref 70–99)
GLUCOSE BLDC GLUCOMTR-MCNC: 178 MG/DL — HIGH (ref 70–99)
GLUCOSE BLDC GLUCOMTR-MCNC: 193 MG/DL — HIGH (ref 70–99)
GLUCOSE BLDC GLUCOMTR-MCNC: 252 MG/DL — HIGH (ref 70–99)
GLUCOSE BLDC GLUCOMTR-MCNC: 258 MG/DL — HIGH (ref 70–99)
GLUCOSE SERPL-MCNC: 187 MG/DL — HIGH (ref 70–99)
GLUCOSE UR QL: NEGATIVE — SIGNIFICANT CHANGE UP
HCT VFR BLD CALC: 32.7 % — LOW (ref 34.5–45)
HGB BLD-MCNC: 10.2 G/DL — LOW (ref 11.5–15.5)
IANC: 2.82 K/UL — SIGNIFICANT CHANGE UP (ref 1.5–8.5)
IMM GRANULOCYTES NFR BLD AUTO: 0.4 % — SIGNIFICANT CHANGE UP (ref 0–1.5)
KETONES UR-MCNC: NEGATIVE — SIGNIFICANT CHANGE UP
LEUKOCYTE ESTERASE UR-ACNC: NEGATIVE — SIGNIFICANT CHANGE UP
LYMPHOCYTES # BLD AUTO: 1.5 K/UL — SIGNIFICANT CHANGE UP (ref 1–3.3)
LYMPHOCYTES # BLD AUTO: 29.8 % — SIGNIFICANT CHANGE UP (ref 13–44)
MAGNESIUM SERPL-MCNC: 2.5 MG/DL — SIGNIFICANT CHANGE UP (ref 1.6–2.6)
MCHC RBC-ENTMCNC: 28 PG — SIGNIFICANT CHANGE UP (ref 27–34)
MCHC RBC-ENTMCNC: 31.2 GM/DL — LOW (ref 32–36)
MCV RBC AUTO: 89.8 FL — SIGNIFICANT CHANGE UP (ref 80–100)
MONOCYTES # BLD AUTO: 0.56 K/UL — SIGNIFICANT CHANGE UP (ref 0–0.9)
MONOCYTES NFR BLD AUTO: 11.1 % — SIGNIFICANT CHANGE UP (ref 2–14)
NEUTROPHILS # BLD AUTO: 2.82 K/UL — SIGNIFICANT CHANGE UP (ref 1.8–7.4)
NEUTROPHILS NFR BLD AUTO: 55.9 % — SIGNIFICANT CHANGE UP (ref 43–77)
NITRITE UR-MCNC: NEGATIVE — SIGNIFICANT CHANGE UP
NRBC # BLD: 0 /100 WBCS — SIGNIFICANT CHANGE UP
NRBC # FLD: 0 K/UL — SIGNIFICANT CHANGE UP
PH UR: 5.5 — SIGNIFICANT CHANGE UP (ref 5–8)
PHOSPHATE SERPL-MCNC: 4 MG/DL — SIGNIFICANT CHANGE UP (ref 2.5–4.5)
PLATELET # BLD AUTO: 200 K/UL — SIGNIFICANT CHANGE UP (ref 150–400)
POTASSIUM SERPL-MCNC: 4.8 MMOL/L — SIGNIFICANT CHANGE UP (ref 3.5–5.3)
POTASSIUM SERPL-SCNC: 4.8 MMOL/L — SIGNIFICANT CHANGE UP (ref 3.5–5.3)
POTASSIUM UR-SCNC: 30.2 MMOL/L — SIGNIFICANT CHANGE UP
PROT UR-MCNC: ABNORMAL
RBC # BLD: 3.64 M/UL — LOW (ref 3.8–5.2)
RBC # FLD: 14.9 % — HIGH (ref 10.3–14.5)
RBC CASTS # UR COMP ASSIST: 1 /HPF — SIGNIFICANT CHANGE UP (ref 0–4)
SODIUM SERPL-SCNC: 140 MMOL/L — SIGNIFICANT CHANGE UP (ref 135–145)
SODIUM UR-SCNC: 98 MMOL/L — SIGNIFICANT CHANGE UP
SP GR SPEC: 1.01 — SIGNIFICANT CHANGE UP (ref 1.01–1.02)
UROBILINOGEN FLD QL: SIGNIFICANT CHANGE UP
WBC # BLD: 5.04 K/UL — SIGNIFICANT CHANGE UP (ref 3.8–10.5)
WBC # FLD AUTO: 5.04 K/UL — SIGNIFICANT CHANGE UP (ref 3.8–10.5)
WBC UR QL: 1 /HPF — SIGNIFICANT CHANGE UP (ref 0–5)

## 2021-05-02 PROCEDURE — 99232 SBSQ HOSP IP/OBS MODERATE 35: CPT

## 2021-05-02 PROCEDURE — 99233 SBSQ HOSP IP/OBS HIGH 50: CPT

## 2021-05-02 RX ADMIN — ATORVASTATIN CALCIUM 80 MILLIGRAM(S): 80 TABLET, FILM COATED ORAL at 21:21

## 2021-05-02 RX ADMIN — HEPARIN SODIUM 5000 UNIT(S): 5000 INJECTION INTRAVENOUS; SUBCUTANEOUS at 05:06

## 2021-05-02 RX ADMIN — Medication 3: at 17:29

## 2021-05-02 RX ADMIN — GABAPENTIN 300 MILLIGRAM(S): 400 CAPSULE ORAL at 13:00

## 2021-05-02 RX ADMIN — Medication 100 MILLIGRAM(S): at 12:01

## 2021-05-02 RX ADMIN — Medication 1: at 13:00

## 2021-05-02 RX ADMIN — Medication 1: at 21:21

## 2021-05-02 RX ADMIN — ISOSORBIDE DINITRATE 10 MILLIGRAM(S): 5 TABLET ORAL at 12:01

## 2021-05-02 RX ADMIN — Medication 81 MILLIGRAM(S): at 12:01

## 2021-05-02 RX ADMIN — HUMAN INSULIN 20 UNIT(S): 100 INJECTION, SUSPENSION SUBCUTANEOUS at 17:29

## 2021-05-02 RX ADMIN — Medication 100 MILLIGRAM(S): at 05:06

## 2021-05-02 RX ADMIN — Medication 2.5 MG/HR: at 12:01

## 2021-05-02 RX ADMIN — ISOSORBIDE DINITRATE 10 MILLIGRAM(S): 5 TABLET ORAL at 17:29

## 2021-05-02 RX ADMIN — CARVEDILOL PHOSPHATE 6.25 MILLIGRAM(S): 80 CAPSULE, EXTENDED RELEASE ORAL at 17:29

## 2021-05-02 RX ADMIN — HUMAN INSULIN 20 UNIT(S): 100 INJECTION, SUSPENSION SUBCUTANEOUS at 05:16

## 2021-05-02 RX ADMIN — GABAPENTIN 300 MILLIGRAM(S): 400 CAPSULE ORAL at 05:07

## 2021-05-02 RX ADMIN — GABAPENTIN 300 MILLIGRAM(S): 400 CAPSULE ORAL at 21:21

## 2021-05-02 RX ADMIN — Medication 100 MILLIGRAM(S): at 13:00

## 2021-05-02 RX ADMIN — CARVEDILOL PHOSPHATE 6.25 MILLIGRAM(S): 80 CAPSULE, EXTENDED RELEASE ORAL at 05:06

## 2021-05-02 RX ADMIN — Medication 100 MILLIGRAM(S): at 21:21

## 2021-05-02 RX ADMIN — ISOSORBIDE DINITRATE 10 MILLIGRAM(S): 5 TABLET ORAL at 05:06

## 2021-05-02 RX ADMIN — Medication 1 TABLET(S): at 12:01

## 2021-05-02 RX ADMIN — Medication 1: at 09:24

## 2021-05-02 RX ADMIN — HEPARIN SODIUM 5000 UNIT(S): 5000 INJECTION INTRAVENOUS; SUBCUTANEOUS at 13:04

## 2021-05-02 NOTE — PROGRESS NOTE ADULT - PROBLEM SELECTOR PLAN 6
- Chronic weekend EtOH use, 1 pint over the weekend, no history of hospitalizations for EtOH use but has been in rehab  - Currently no s/s of EtOH withdrawal, CIWAs have been 0. will d/c CIWA

## 2021-05-02 NOTE — PROGRESS NOTE ADULT - SUBJECTIVE AND OBJECTIVE BOX
Patient is a 71y old  Female who presents with a chief complaint of Worsening SOB (02 May 2021 08:18)      SUBJECTIVE / OVERNIGHT EVENTS:  Patient seen and examined today.    MEDICATIONS  (STANDING):  allopurinol 100 milliGRAM(s) Oral daily  aspirin enteric coated 81 milliGRAM(s) Oral daily  atorvastatin 80 milliGRAM(s) Oral at bedtime  carvedilol 6.25 milliGRAM(s) Oral every 12 hours  dextrose 40% Gel 15 Gram(s) Oral once  dextrose 5%. 1000 milliLiter(s) (50 mL/Hr) IV Continuous <Continuous>  dextrose 5%. 1000 milliLiter(s) (100 mL/Hr) IV Continuous <Continuous>  dextrose 50% Injectable 25 Gram(s) IV Push once  dextrose 50% Injectable 12.5 Gram(s) IV Push once  dextrose 50% Injectable 25 Gram(s) IV Push once  furosemide Infusion 5 mG/Hr (2.5 mL/Hr) IV Continuous <Continuous>  gabapentin 300 milliGRAM(s) Oral three times a day  glucagon  Injectable 1 milliGRAM(s) IntraMuscular once  heparin   Injectable 5000 Unit(s) SubCutaneous every 8 hours  hydrALAZINE 100 milliGRAM(s) Oral three times a day  insulin lispro (ADMELOG) corrective regimen sliding scale   SubCutaneous three times a day before meals  insulin lispro (ADMELOG) corrective regimen sliding scale   SubCutaneous at bedtime  insulin NPH human recombinant 20 Unit(s) SubCutaneous two times a day  isosorbide   dinitrate Tablet (ISORDIL) 10 milliGRAM(s) Oral three times a day  multivitamin 1 Tablet(s) Oral daily    MEDICATIONS  (PRN):  acetaminophen   Tablet .. 650 milliGRAM(s) Oral every 6 hours PRN Temp greater or equal to 38C (100.4F), Mild Pain (1 - 3), Moderate Pain (4 - 6)  ketotifen 0.025% Ophthalmic Solution 1 Drop(s) Both EYES two times a day PRN Allergic Conjunctivitis  LORazepam     Tablet 1 milliGRAM(s) Oral every 2 hours PRN CIWA-Ar score increase by 2 points and a total score of 7 or less  LORazepam     Tablet 1 milliGRAM(s) Oral every 1 hour PRN CIWA-Ar score 8 or greater      Vital Signs Last 24 Hrs  T(C): 36.9 (02 May 2021 12:58), Max: 36.9 (02 May 2021 12:58)  T(F): 98.4 (02 May 2021 12:58), Max: 98.4 (02 May 2021 12:58)  HR: 65 (02 May 2021 12:58) (65 - 75)  BP: 147/58 (02 May 2021 12:58) (136/57 - 151/70)  BP(mean): --  RR: 17 (02 May 2021 12:58) (17 - 18)  SpO2: 95% (02 May 2021 12:58) (95% - 99%)  CAPILLARY BLOOD GLUCOSE      POCT Blood Glucose.: 164 mg/dL (02 May 2021 12:26)  POCT Blood Glucose.: 178 mg/dL (02 May 2021 08:25)  POCT Blood Glucose.: 193 mg/dL (02 May 2021 05:11)  POCT Blood Glucose.: 234 mg/dL (01 May 2021 22:21)  POCT Blood Glucose.: 191 mg/dL (01 May 2021 17:15)    I&O's Summary    01 May 2021 07:01  -  02 May 2021 07:00  --------------------------------------------------------  IN: 820 mL / OUT: 1400 mL / NET: -580 mL      Constitutional: NAD, awake and alert  EYES: no scleral icterus   HEENT:  NCAT  Neck: Soft and supple, no thyromegaly   Respiratory: no respiratory distress; Breath sounds decreased in the bilateral bases, improving   Cardiovascular: S1 and S2, regular rate. peripheral pulses strong in UEs   Gastrointestinal: Soft, nontender, nondistended. +BS  Extremities: bilateral pitting lower extremity edema 1+ (was 2+ yesterday), no calf tenderness, warm to touch  Psych: Alert and Oriented x3, normal mood, normal affect    LABS:                        10.2   5.04  )-----------( 200      ( 02 May 2021 06:55 )             32.7     05-02    140  |  105  |  55<H>  ----------------------------<  187<H>  4.8   |  22  |  3.27<H>    Ca    10.0      02 May 2021 06:55  Phos  4.0     05-02  Mg     2.5     05-02                RADIOLOGY & ADDITIONAL TESTS:    Imaging Personally Reviewed:    Consultant(s) Notes Reviewed:      Care Discussed with Consultants/Other Providers:

## 2021-05-02 NOTE — CONSULT NOTE ADULT - PROBLEM SELECTOR RECOMMENDATION 9
Pt. with RADHA on CKD in the setting of ACE-i and ? CRS. Upon lab review on [facility], Scr was 2.83 on 4/20/21. Scr on arrival was 2.41. Pt. started on IV Lasix infusion by Cardiology. Scr increased to 3.28 today. US Kidneys negative for hydronephrosis. Pt. with likely CKD from DM and HTN. Pt. with hemodynamically mediated RADHA on CKD. ACE-i discontinued. Cardiology follow up reviewed (5/2/21). Agree with IV Lasix infusion and fluid restriction for now as pt. remains clinically hypervolemic. Check UA and urine electrolytes (including uUrea). Monitor labs and urine output. Avoid potential nephrotoxins. Dose medications as per eGFR.    Case discussed with Attending on call - Dr. Hernandez.  If any questions, please feel free to contact me  Antonino aHro   Nephrology Fellow  [phone]  (After 5 pm or on weekends use Amion for fellow on call) Pt. with RADHA on CKD in the setting of heart failure. Upon lab review on [facility], Scr was 2.83 on 4/20/21. Scr on arrival was 2.41. Pt. started on IV Lasix infusion by Cardiology. Scr increased to 3.28 today. US Kidneys negative for hydronephrosis. Pt. with likely CKD from DM and HTN. Pt. with hemodynamically mediated RADHA on CKD. ACE-I therapy discontinued. Cardiology follow up reviewed (5/2/21). Agree with IV Lasix infusion and fluid restriction for now as pt. remains clinically hypervolemic. Check UA. Monitor labs and urine output. Avoid potential nephrotoxins. Dose medications as per eGFR.    Case discussed with Attending on call - Dr. Hernandez.  If any questions, please feel free to contact me  Antonino Haro   Nephrology Fellow  [phone]  (After 5 pm or on weekends use Amion for fellow on call)

## 2021-05-02 NOTE — PROGRESS NOTE ADULT - PROBLEM SELECTOR PLAN 1
Likely HFrEF w/ decompensation.   Please get TTE/ stress test results from her cardiologist Dr. Alas.   Started Lasix gtt 5 mg/hr. Started Isordil 10 mg po TID.   -140's, consider increasing isordil to 20mg TID with holding parameter SBP <90.  Continue Coreg 6.25 mg po BID, consider increasing to 12.5mg BID with holding parameter SBP <90 and HR <60.  Continue hydralazine 100 mg po TID.   Keep K 4.0-5.0 and mag 2.0.   Daily standing weights, and strict I/O.

## 2021-05-02 NOTE — PROGRESS NOTE ADULT - SUBJECTIVE AND OBJECTIVE BOX
Cardiology Progress Note    Interval: Pt resting comfortably in bed. Noted overall feeling well with minimal SOB.     Tele: Sinus rhythm rate 70's    Medications:  acetaminophen   Tablet .. 650 milliGRAM(s) Oral every 6 hours PRN  allopurinol 100 milliGRAM(s) Oral daily  aspirin enteric coated 81 milliGRAM(s) Oral daily  atorvastatin 80 milliGRAM(s) Oral at bedtime  carvedilol 6.25 milliGRAM(s) Oral every 12 hours  dextrose 40% Gel 15 Gram(s) Oral once  dextrose 5%. 1000 milliLiter(s) IV Continuous <Continuous>  dextrose 5%. 1000 milliLiter(s) IV Continuous <Continuous>  dextrose 50% Injectable 25 Gram(s) IV Push once  dextrose 50% Injectable 12.5 Gram(s) IV Push once  dextrose 50% Injectable 25 Gram(s) IV Push once  furosemide Infusion 5 mG/Hr IV Continuous <Continuous>  gabapentin 300 milliGRAM(s) Oral three times a day  glucagon  Injectable 1 milliGRAM(s) IntraMuscular once  heparin   Injectable 5000 Unit(s) SubCutaneous every 8 hours  hydrALAZINE 100 milliGRAM(s) Oral three times a day  insulin lispro (ADMELOG) corrective regimen sliding scale   SubCutaneous three times a day before meals  insulin lispro (ADMELOG) corrective regimen sliding scale   SubCutaneous at bedtime  insulin NPH human recombinant 20 Unit(s) SubCutaneous two times a day  isosorbide   dinitrate Tablet (ISORDIL) 10 milliGRAM(s) Oral three times a day  ketotifen 0.025% Ophthalmic Solution 1 Drop(s) Both EYES two times a day PRN  LORazepam     Tablet 1 milliGRAM(s) Oral every 2 hours PRN  LORazepam     Tablet 1 milliGRAM(s) Oral every 1 hour PRN  multivitamin 1 Tablet(s) Oral daily      Review of Systems:  Constitutional: [ ] Fever [ ] Chills [ ] Fatigue [ ] Weight change   HEENT: [ ] Blurred vision [ ] Eye Pain [ ] Headache [ ] Runny nose [ ] Sore Throat   Respiratory: [ ] Cough [ ] Wheezing [ ] Shortness of breath  Cardiovascular: [ ] Chest Pain [ ] Palpitations [ ] BREEN [ ] PND [ ] Orthopnea  Gastrointestinal: [ ] Abdominal Pain [ ] Diarrhea [ ] Constipation [ ] Hemorrhoids [ ] Nausea [ ] Vomiting  Genitourinary: [ ] Nocturia [ ] Dysuria [ ] Incontinence  Extremities: [ ] Swelling [ ] Joint Pain  Neurologic: [ ] Focal deficit [ ] Paresthesias [ ] Syncope  Lymphatic: [ ] Swelling [ ] Lymphadenopathy   Skin: [ ] Rash [ ] Ecchymoses [ ] Wounds [ ] Lesions  Psychiatry: [ ] Depression [ ] Suicidal/Homicidal Ideation [ ] Anxiety [ ] Sleep Disturbances  [ ] 10 point review of systems is otherwise negative except as mentioned above            [ ]Unable to obtain    Vitals:  T(C): 36.6 (21 @ 04:30), Max: 36.8 (21 @ 08:30)  HR: 68 (21 @ 04:30) (68 - 79)  BP: 147/64 (21 @ 04:30) (136/57 - 151/70)  BP(mean): --  RR: 18 (21 @ 04:30) (17 - 18)  SpO2: 97% (21 @ 04:30) (97% - 99%)  Wt(kg): --  Daily     Daily Weight in k.3 (02 May 2021 04:30)  I&O's Summary    01 May 2021 07:01  -  02 May 2021 07:00  --------------------------------------------------------  IN: 820 mL / OUT: 1400 mL / NET: -580 mL        Physical Exam:  Appearance: Normal	  HEENT:   Normal oral mucosa, PERRL, EOMI	  Lymphatic: No lymphadenopathy  Cardiovascular: Normal S1 S2, difficult to assess JVD, No murmurs, 1+ b/l LE edema. Warm b/l.   Respiratory: Lungs clear to auscultation	  Psychiatry: A & O x 3, Mood & affect appropriate  Gastrointestinal:  Soft, Non-tender, + BS	  Skin: No rashes, No ecchymoses, No cyanosis	  Neurologic: Non-focal  Extremities: Normal range of motion, No clubbing, cyanosis   Vascular: Peripheral pulses palpable 2+ bilaterally      Labs:                        10.2   5.04  )-----------( 200      ( 02 May 2021 06:55 )             32.7         140  |  105  |  55<H>  ----------------------------<  187<H>  4.8   |  22  |  3.27<H>    Ca    10.0      02 May 2021 06:55  Phos  4.0     05-02  Mg     2.5     05-            Serum Pro-Brain Natriuretic Peptide: 244 pg/mL ( @ 07:31)  Serum Pro-Brain Natriuretic Peptide: 303 pg/mL ( @ 07:41)  Serum Pro-Brain Natriuretic Peptide: 295 pg/mL ( @ 10:24)          New results/imaging:

## 2021-05-02 NOTE — CONSULT NOTE ADULT - SUBJECTIVE AND OBJECTIVE BOX
[facility] DIVISION OF KIDNEY DISEASES AND HYPERTENSION -- [phone]  -- INITIAL CONSULT NOTE  --------------------------------------------------------------------------------  HPI: 71-year-old female with DM (>20 years), HTN (>20 years), CHF, HLD was admitted to [facility] on 21 for CHF exacerbation. Pt. says she was developed generalized swelling and was told to have CHF about 6 months ago and was started on diuretics by her cardiologist and nephrologist. Despite diuretics, pt. began to notice increased swelling and requiring more pillows to sleep at night, hence pt. came to [facility]. Nephrology team consulted for RADHA on CKD. Pt. has been evaluated by a nephrologist (Dr. Escudero) recently for CKD. Reports that her sister had ESRD and was on HD (thought to be due to AIDS). Denies any NSAIDs, OTC medications, or herbal supplements. Of note, pt. given one dose of ACE-i on 21. Upon lab review on [facility], earliest Scr available was elevated at 1.52 on 10/16/15. Scr steadily increased to 2.83 on 21. Scr on admission was 2.41. Pt. given IV intermittent Lasix and now started on IV Lasix infusion by Cardiology. Scr has increased to 3.2 today.    Pt. evaluated at bedside. Pt. continues to have LE edema and becomes short of breath after long sentences. Denies any other complaints.    PAST HISTORY  --------------------------------------------------------------------------------  PAST MEDICAL & SURGICAL HISTORY:  CKD (chronic kidney disease)    Diabetes    HLD (hyperlipidemia)    HTN (hypertension)    PVD (peripheral vascular disease)    CHF (congestive heart failure)    Peripheral neuropathy    History of angioplasty of peripheral vessel  RLE - no stents    History of hysterectomy    History of breast biopsy  bilateral - benign    S/P thyroid biopsy  benign    History of cataract surgery  bilateral eyes      FAMILY HISTORY:  Lung cancer  mother    Family history of chronic renal failure (Sibling)  sister  from renal failure @ age 35    Family history of myocardial infarction (Sibling)  sister      PAST SOCIAL HISTORY: EtOH user, lives at home with daughter    ALLERGIES & MEDICATIONS  --------------------------------------------------------------------------------  Allergies    No Known Allergies    Intolerances    Standing Inpatient Medications  allopurinol 100 milliGRAM(s) Oral daily  aspirin enteric coated 81 milliGRAM(s) Oral daily  atorvastatin 80 milliGRAM(s) Oral at bedtime  carvedilol 6.25 milliGRAM(s) Oral every 12 hours  furosemide Infusion 5 mG/Hr IV Continuous <Continuous>  gabapentin 300 milliGRAM(s) Oral three times a day  glucagon  Injectable 1 milliGRAM(s) IntraMuscular once  heparin   Injectable 5000 Unit(s) SubCutaneous every 8 hours  hydrALAZINE 100 milliGRAM(s) Oral three times a day  insulin lispro (ADMELOG) corrective regimen sliding scale   SubCutaneous three times a day before meals  insulin lispro (ADMELOG) corrective regimen sliding scale   SubCutaneous at bedtime  insulin NPH human recombinant 20 Unit(s) SubCutaneous two times a day  isosorbide   dinitrate Tablet (ISORDIL) 10 milliGRAM(s) Oral three times a day  multivitamin 1 Tablet(s) Oral daily    REVIEW OF SYSTEMS  --------------------------------------------------------------------------------  Gen: no fatigue  Respiratory: see HPI  CV: No chest pain  GI: No abdominal pain  MSK: + LE edema  Neuro: No dizziness  Heme: No bleeding    All other systems were reviewed and are negative, except as noted.    VITALS/PHYSICAL EXAM  --------------------------------------------------------------------------------  T(C): 36.9 (21 @ 12:58), Max: 36.9 (21 @ 12:58)  HR: 65 (21 12:58) (65 - 75)  BP: 147/58 (21 12:58) (136/57 - 151/70)  RR: 17 (21 @ 12:58) (17 - 18)  SpO2: 95% (21 @ 12:58) (95% - 99%)  Wt(kg): --    21 @ 07:01  -  21 @ 07:00  --------------------------------------------------------  IN: 820 mL / OUT: 1400 mL / NET: -580 mL    Physical Exam:  	Gen: NAD  	HEENT: MMM  	Pulm: good air entry B/L  	CV: S1S2  	Abd: Soft, +BS   	Ext: LE pitting edema B/L  	Neuro: Awake  	Skin: Warm and dry  	  LABS/STUDIES  --------------------------------------------------------------------------------              10.2   5.04  >-----------<  200      [21 @ 06:55]              32.7     140  |  105  |  55  ----------------------------<  187      [21 @ 06:55]  4.8   |  22  |  3.27        Ca     10.0     [21 @ 06:55]      Mg     2.5     [21 @ 06:55]      Phos  4.0     [21 @ 06:55]    Creatinine Trend:  SCr 3.27 [ @ 06:55]  SCr 2.84 [ @ 07:31]  SCr 2.43 [ @ 07:41]  SCr 2.41 [ @ 10:24] [facility] DIVISION OF KIDNEY DISEASES AND HYPERTENSION -- [phone]  -- INITIAL CONSULT NOTE  --------------------------------------------------------------------------------  HPI: 71-year-old female with DM (>20 years), HTN (>20 years), CHF, HLD was admitted to [facility] on 21 for CHF exacerbation. Pt. says she was developed generalized swelling and was told to have CHF about 6 months ago and was started on diuretics by her cardiologist and nephrologist. Despite diuretics, pt. began to notice increased swelling and requiring more pillows to sleep at night, hence pt. came to [facility]. Nephrology team consulted for RADHA on CKD. Pt. has been evaluated by a nephrologist (Dr. Escudero) recently for CKD. Reports that her sister had ESRD and was on HD (thought to be due to AIDS). Denies any NSAIDs, OTC medications, or herbal supplements. Of note, pt. given one dose of ACE-I on 21. Upon lab review on [facility], earliest Scr available was elevated at 1.52 on 10/16/15. Scr steadily increased to 2.83 on 21. Scr on admission was 2.41. Pt. given IV intermittent Lasix and now started on IV Lasix infusion by Cardiology. Scr has increased to 3.2 today.    Pt. evaluated at bedside. Pt. continues to have LE edema and becomes short of breath after long sentences. Denies any other complaints.    PAST HISTORY  --------------------------------------------------------------------------------  PAST MEDICAL & SURGICAL HISTORY:  CKD (chronic kidney disease)    Diabetes    HLD (hyperlipidemia)    HTN (hypertension)    PVD (peripheral vascular disease)    CHF (congestive heart failure)    Peripheral neuropathy    History of angioplasty of peripheral vessel  RLE - no stents    History of hysterectomy    History of breast biopsy  bilateral - benign    S/P thyroid biopsy  benign    History of cataract surgery  bilateral eyes      FAMILY HISTORY:  Lung cancer  mother    Family history of chronic renal failure (Sibling)  sister  from renal failure @ age 35    Family history of myocardial infarction (Sibling)  sister      PAST SOCIAL HISTORY: EtOH user, lives at home with daughter    ALLERGIES & MEDICATIONS  --------------------------------------------------------------------------------  Allergies    No Known Allergies    Intolerances    Standing Inpatient Medications  allopurinol 100 milliGRAM(s) Oral daily  aspirin enteric coated 81 milliGRAM(s) Oral daily  atorvastatin 80 milliGRAM(s) Oral at bedtime  carvedilol 6.25 milliGRAM(s) Oral every 12 hours  furosemide Infusion 5 mG/Hr IV Continuous <Continuous>  gabapentin 300 milliGRAM(s) Oral three times a day  glucagon  Injectable 1 milliGRAM(s) IntraMuscular once  heparin   Injectable 5000 Unit(s) SubCutaneous every 8 hours  hydrALAZINE 100 milliGRAM(s) Oral three times a day  insulin lispro (ADMELOG) corrective regimen sliding scale   SubCutaneous three times a day before meals  insulin lispro (ADMELOG) corrective regimen sliding scale   SubCutaneous at bedtime  insulin NPH human recombinant 20 Unit(s) SubCutaneous two times a day  isosorbide   dinitrate Tablet (ISORDIL) 10 milliGRAM(s) Oral three times a day  multivitamin 1 Tablet(s) Oral daily    REVIEW OF SYSTEMS  --------------------------------------------------------------------------------  Gen: no fatigue  Respiratory: see HPI  CV: No chest pain  GI: No abdominal pain  MSK: + LE edema  Neuro: No dizziness  Heme: No bleeding    All other systems were reviewed and are negative, except as noted.    VITALS/PHYSICAL EXAM  --------------------------------------------------------------------------------  T(C): 36.9 (21 @ 12:58), Max: 36.9 (21 @ 12:58)  HR: 65 (21 12:58) (65 - 75)  BP: 147/58 (21 12:58) (136/57 - 151/70)  RR: 17 (21 @ 12:58) (17 - 18)  SpO2: 95% (21 @ 12:58) (95% - 99%)  Wt(kg): --    21 @ 07:01  -  21 @ 07:00  --------------------------------------------------------  IN: 820 mL / OUT: 1400 mL / NET: -580 mL    Physical Exam:  	Gen: resting, NAD  	HEENT: MMM  	Pulm: good air entry B/L  	CV: S1S2  	Abd: Soft, +BS   	Ext: LE pitting edema B/L  	Neuro: Awake  	Skin: Warm and dry  	  LABS/STUDIES  --------------------------------------------------------------------------------              10.2   5.04  >-----------<  200      [21 @ 06:55]              32.7     140  |  105  |  55  ----------------------------<  187      [21 @ 06:55]  4.8   |  22  |  3.27        Ca     10.0     [21 @ 06:55]      Mg     2.5     [21 @ 06:55]      Phos  4.0     [21 @ 06:55]        137  |  100  |  69<H>  ----------------------------<  248<H>  5.0   |  23  |  4.11<H>    Ca    9.8      03 May 2021 06:57  Phos  3.9       Mg     2.4         Creatinine Trend:  SCr 3.27 [ 06:55]  SCr 2.84 [ @ 07:31]  SCr 2.43 [ @ 07:41]  SCr 2.41 [ @ 10:24]

## 2021-05-02 NOTE — PROGRESS NOTE ADULT - PROBLEM SELECTOR PLAN 2
- Cr increasing now to 3.3, unknown baseline  - renal US -> no hydronephrosis   - currently on lasix gtt@5/hr  - consult renal   - avoid nephrotoxins   - monitor BMP

## 2021-05-02 NOTE — PROGRESS NOTE ADULT - PROBLEM SELECTOR PLAN 1
- Presenting with worsening SOB, orthopnea, b/l LE edema, weight gain, increasing abdominal girth, here with bibasilar crackles and JVP concerning for CHF exacerbation  - c/w lasix gtt, may need decrease considering Cr rise.   - TTE pending   - 1.5L fluid restriction, daily weights, I/O   - heart failure consult appreciated

## 2021-05-02 NOTE — CONSULT NOTE ADULT - ATTENDING COMMENTS
Pt. with RADHA on CKD in setting of heart failure. Pt. seen and examined today (5/3/21). Pt. feels better. Pt. clinically stable. Scr increased to 4.11 today. IV Lasix infusion discontinued this morning (as per pt.). Monitor labs and urine output. Avoid any potential nephrotoxins. Dose medications as per eGFR. Pt. seen by nephrologist Dr. Escudero as outpatient. Will ask his colleagues to follow patient during current hospital stay. Discussed with patient.

## 2021-05-03 LAB
ANION GAP SERPL CALC-SCNC: 14 MMOL/L — SIGNIFICANT CHANGE UP (ref 7–14)
BASOPHILS # BLD AUTO: 0.02 K/UL — SIGNIFICANT CHANGE UP (ref 0–0.2)
BASOPHILS NFR BLD AUTO: 0.5 % — SIGNIFICANT CHANGE UP (ref 0–2)
BUN SERPL-MCNC: 69 MG/DL — HIGH (ref 7–23)
CALCIUM SERPL-MCNC: 9.8 MG/DL — SIGNIFICANT CHANGE UP (ref 8.4–10.5)
CHLORIDE SERPL-SCNC: 100 MMOL/L — SIGNIFICANT CHANGE UP (ref 98–107)
CO2 SERPL-SCNC: 23 MMOL/L — SIGNIFICANT CHANGE UP (ref 22–31)
CREAT SERPL-MCNC: 4.11 MG/DL — HIGH (ref 0.5–1.3)
EOSINOPHIL # BLD AUTO: 0.11 K/UL — SIGNIFICANT CHANGE UP (ref 0–0.5)
EOSINOPHIL NFR BLD AUTO: 2.5 % — SIGNIFICANT CHANGE UP (ref 0–6)
GLUCOSE BLDC GLUCOMTR-MCNC: 231 MG/DL — HIGH (ref 70–99)
GLUCOSE SERPL-MCNC: 248 MG/DL — HIGH (ref 70–99)
HCT VFR BLD CALC: 31.5 % — LOW (ref 34.5–45)
HGB BLD-MCNC: 9.9 G/DL — LOW (ref 11.5–15.5)
IANC: 2.54 K/UL — SIGNIFICANT CHANGE UP (ref 1.5–8.5)
IMM GRANULOCYTES NFR BLD AUTO: 0.2 % — SIGNIFICANT CHANGE UP (ref 0–1.5)
LYMPHOCYTES # BLD AUTO: 1.21 K/UL — SIGNIFICANT CHANGE UP (ref 1–3.3)
LYMPHOCYTES # BLD AUTO: 27.6 % — SIGNIFICANT CHANGE UP (ref 13–44)
MAGNESIUM SERPL-MCNC: 2.4 MG/DL — SIGNIFICANT CHANGE UP (ref 1.6–2.6)
MCHC RBC-ENTMCNC: 28.4 PG — SIGNIFICANT CHANGE UP (ref 27–34)
MCHC RBC-ENTMCNC: 31.4 GM/DL — LOW (ref 32–36)
MCV RBC AUTO: 90.5 FL — SIGNIFICANT CHANGE UP (ref 80–100)
MONOCYTES # BLD AUTO: 0.49 K/UL — SIGNIFICANT CHANGE UP (ref 0–0.9)
MONOCYTES NFR BLD AUTO: 11.2 % — SIGNIFICANT CHANGE UP (ref 2–14)
NEUTROPHILS # BLD AUTO: 2.54 K/UL — SIGNIFICANT CHANGE UP (ref 1.8–7.4)
NEUTROPHILS NFR BLD AUTO: 58 % — SIGNIFICANT CHANGE UP (ref 43–77)
NRBC # BLD: 0 /100 WBCS — SIGNIFICANT CHANGE UP
NRBC # FLD: 0 K/UL — SIGNIFICANT CHANGE UP
PHOSPHATE SERPL-MCNC: 3.9 MG/DL — SIGNIFICANT CHANGE UP (ref 2.5–4.5)
PLATELET # BLD AUTO: 184 K/UL — SIGNIFICANT CHANGE UP (ref 150–400)
POTASSIUM SERPL-MCNC: 5 MMOL/L — SIGNIFICANT CHANGE UP (ref 3.5–5.3)
POTASSIUM SERPL-SCNC: 5 MMOL/L — SIGNIFICANT CHANGE UP (ref 3.5–5.3)
RBC # BLD: 3.48 M/UL — LOW (ref 3.8–5.2)
RBC # FLD: 14.8 % — HIGH (ref 10.3–14.5)
SODIUM SERPL-SCNC: 137 MMOL/L — SIGNIFICANT CHANGE UP (ref 135–145)
WBC # BLD: 4.38 K/UL — SIGNIFICANT CHANGE UP (ref 3.8–10.5)
WBC # FLD AUTO: 4.38 K/UL — SIGNIFICANT CHANGE UP (ref 3.8–10.5)

## 2021-05-03 PROCEDURE — 99233 SBSQ HOSP IP/OBS HIGH 50: CPT

## 2021-05-03 PROCEDURE — 99223 1ST HOSP IP/OBS HIGH 75: CPT | Mod: GC

## 2021-05-03 PROCEDURE — 93306 TTE W/DOPPLER COMPLETE: CPT | Mod: 26

## 2021-05-03 RX ADMIN — HUMAN INSULIN 20 UNIT(S): 100 INJECTION, SUSPENSION SUBCUTANEOUS at 09:19

## 2021-05-03 RX ADMIN — Medication 81 MILLIGRAM(S): at 09:19

## 2021-05-03 RX ADMIN — GABAPENTIN 300 MILLIGRAM(S): 400 CAPSULE ORAL at 20:50

## 2021-05-03 RX ADMIN — GABAPENTIN 300 MILLIGRAM(S): 400 CAPSULE ORAL at 05:06

## 2021-05-03 RX ADMIN — ISOSORBIDE DINITRATE 10 MILLIGRAM(S): 5 TABLET ORAL at 13:01

## 2021-05-03 RX ADMIN — Medication 2: at 13:00

## 2021-05-03 RX ADMIN — Medication 100 MILLIGRAM(S): at 05:06

## 2021-05-03 RX ADMIN — Medication 100 MILLIGRAM(S): at 09:20

## 2021-05-03 RX ADMIN — CARVEDILOL PHOSPHATE 6.25 MILLIGRAM(S): 80 CAPSULE, EXTENDED RELEASE ORAL at 05:06

## 2021-05-03 RX ADMIN — GABAPENTIN 300 MILLIGRAM(S): 400 CAPSULE ORAL at 13:01

## 2021-05-03 RX ADMIN — Medication 2: at 09:19

## 2021-05-03 RX ADMIN — HEPARIN SODIUM 5000 UNIT(S): 5000 INJECTION INTRAVENOUS; SUBCUTANEOUS at 13:01

## 2021-05-03 RX ADMIN — CARVEDILOL PHOSPHATE 6.25 MILLIGRAM(S): 80 CAPSULE, EXTENDED RELEASE ORAL at 17:48

## 2021-05-03 RX ADMIN — Medication 100 MILLIGRAM(S): at 20:50

## 2021-05-03 RX ADMIN — Medication 2: at 17:47

## 2021-05-03 RX ADMIN — ISOSORBIDE DINITRATE 10 MILLIGRAM(S): 5 TABLET ORAL at 05:06

## 2021-05-03 RX ADMIN — Medication 100 MILLIGRAM(S): at 13:01

## 2021-05-03 RX ADMIN — Medication 1 TABLET(S): at 09:20

## 2021-05-03 RX ADMIN — HUMAN INSULIN 20 UNIT(S): 100 INJECTION, SUSPENSION SUBCUTANEOUS at 17:47

## 2021-05-03 RX ADMIN — ISOSORBIDE DINITRATE 10 MILLIGRAM(S): 5 TABLET ORAL at 17:48

## 2021-05-03 RX ADMIN — HEPARIN SODIUM 5000 UNIT(S): 5000 INJECTION INTRAVENOUS; SUBCUTANEOUS at 05:06

## 2021-05-03 RX ADMIN — ATORVASTATIN CALCIUM 80 MILLIGRAM(S): 80 TABLET, FILM COATED ORAL at 20:50

## 2021-05-03 NOTE — PROGRESS NOTE ADULT - ASSESSMENT
71 y/p female with PMHX of HTN, DM II, HLD, PAD s/p RLE angiogram, CKD comes in with complaints of worsening SOB, LE edema, abdominal distension.   CXR shows clear lungs, LE dopplers show no DVT, CT head unremarkable. Labs significant for BUN/Cr 46/2.43, K 5.2, proBNP 303. She states she was recently told she had a weak heart and has been placed on oral diuretics, but with minimal relief. She admits to 4 pillow orthopnea, BREEN w/ minimal exertion. No PND, CP, palpitations, dizziness. She has had a stress test with her cardiologist Dr. Alas a few months ago, but unclear of the results. Admits to drinking 1 pint of vodka most weekends. Non smoker, no other drug use.     Acute on Chronic Heart Failure Exacerbation:  -patient appears volume overloaded on clincal exam  -Dr. Alas contacted regarding previous cardiac work up  -ECHO is pending, contacted ECHO dept, she is on schedule today  -for medical managment, continue hydral 100 tid with isordil 10 tid  -continue coreg 6.25 bid  -BUN and creatinine are rising despite diuresis  -patient is net negative 1.1 liters in 24 hours and 2 liters length of stay, remains with peripheral edema  -would continue diuretic with bumex 2mg IVP x 1 and bumex gtt at 1mg/hr  -consult nephrology   -patient admits to significant amount of ETOH use, vodka, LFTs are normal  -Daily standing weights, and strict I/O.     71 y/p female with PMHX of HTN, DM II, HLD, PAD s/p RLE angiogram, CKD comes in with complaints of worsening SOB, LE edema, abdominal distension.   CXR shows clear lungs, LE dopplers show no DVT, CT head unremarkable. Labs significant for BUN/Cr 46/2.43, K 5.2, proBNP 303. She states she was recently told she had a weak heart and has been placed on oral diuretics, but with minimal relief. She admits to 4 pillow orthopnea, BREEN w/ minimal exertion. No PND, CP, palpitations, dizziness. She has had a stress test with her cardiologist Dr. Alas a few months ago, but unclear of the results. Admits to drinking 1 pint of vodka most weekends. Non smoker, no other drug use.     Acute on Chronic Heart Failure Exacerbation:  -patient appears volume overloaded on clincal exam  -Dr. Alas contacted regarding previous cardiac work up  -ECHO is pending, contacted ECHO dept, she is on schedule today  -for medical managment, continue hydral 100 tid with isordil 10 tid  -continue coreg 6.25 bid  -BUN and creatinine are rising despite diuresis  -patient is net negative 1.1 liters in 24 hours and 2 liters length of stay, remains with peripheral edema  -consult nephrology   -patient admits to significant amount of ETOH use, vodka, LFTs are normal  -Daily standing weights, and strict I/O.

## 2021-05-03 NOTE — PROGRESS NOTE ADULT - SUBJECTIVE AND OBJECTIVE BOX
FOLLOW UP:  Heart Failure  HPI:  71F w/ PMHx HTN, HLD, DM2 w/ neuropathy, Recently diagnosed CKD, PAD s/p angiogram RLE (denies any stents, just "clearing out"), thyroid nodules (benign biopsy x2 as per pt) p/w worsening SOB, LE edema, orthopnea, weight gain, and increasing abdominal girth likely 2/2 CHF exacerbation. Also c/o new intermittent double vision (occasionally when watching TV, does not occur otherwise), new dysphagia intermittently.     SUBJECTIVE/OBSERVATIONS:   Patient seen and examined eating her breakfast. She has no complaints of pain or shortness of breath. No complaints of chest pain.  OVERNIGHT EVENTS: creatinine increased to 4.11    TELE EVENTS:   normal sinus rhythm     Vital Signs Last 24 Hrs  T(C): 36.6 (03 May 2021 11:30), Max: 36.7 (02 May 2021 22:11)  T(F): 97.8 (03 May 2021 11:30), Max: 98.1 (02 May 2021 22:11)  HR: 79 (03 May 2021 11:30) (75 - 84)  BP: 138/69 (03 May 2021 11:30) (104/60 - 145/55)  BP(mean): --  RR: 18 (03 May 2021 11:30) (16 - 18)  SpO2: 100% (03 May 2021 11:30) (96% - 100%)  I&O's Summary    02 May 2021 07:01  -  03 May 2021 07:00  --------------------------------------------------------  IN: 232.5 mL / OUT: 1400 mL / NET: -1167.5 mL        MEDICATIONS:  aspirin enteric coated 81 milliGRAM(s) Oral daily  carvedilol 6.25 milliGRAM(s) Oral every 12 hours  heparin   Injectable 5000 Unit(s) SubCutaneous every 8 hours  hydrALAZINE 100 milliGRAM(s) Oral three times a day  isosorbide   dinitrate Tablet (ISORDIL) 10 milliGRAM(s) Oral three times a day  acetaminophen   Tablet .. 650 milliGRAM(s) Oral every 6 hours PRN  gabapentin 300 milliGRAM(s) Oral three times a day  allopurinol 100 milliGRAM(s) Oral daily  atorvastatin 80 milliGRAM(s) Oral at bedtime  dextrose 40% Gel 15 Gram(s) Oral once  dextrose 50% Injectable 25 Gram(s) IV Push once  dextrose 50% Injectable 12.5 Gram(s) IV Push once  dextrose 50% Injectable 25 Gram(s) IV Push once  glucagon  Injectable 1 milliGRAM(s) IntraMuscular once  insulin lispro (ADMELOG) corrective regimen sliding scale   SubCutaneous three times a day before meals  insulin lispro (ADMELOG) corrective regimen sliding scale   SubCutaneous at bedtime  insulin NPH human recombinant 20 Unit(s) SubCutaneous two times a day    dextrose 5%. 1000 milliLiter(s) IV Continuous <Continuous>  dextrose 5%. 1000 milliLiter(s) IV Continuous <Continuous>  ketotifen 0.025% Ophthalmic Solution 1 Drop(s) Both EYES two times a day PRN  multivitamin 1 Tablet(s) Oral daily      REVIEW OF SYSTEMS:  Complete 10point ROS negative.    PHYSICAL EXAM:  General: NAD  Cardiovascular: Normal S1 S2, No JVD, No murmurs, No edema  Respiratory: Lungs clear to auscultation	  Gastrointestinal:  Soft, Non-tender, + BS	  Skin: warm and dry, No rashes, No ecchymoses, No cyanosis	  Extremities: Normal range of motion, No clubbing, cyanosis or edema  Vascular: Peripheral pulses palpable 2+ bilaterally    LABS:	 	    CBC Full  -  ( 03 May 2021 06:57 )  WBC Count : 4.38 K/uL  Hemoglobin : 9.9 g/dL  Hematocrit : 31.5 %  Platelet Count - Automated : 184 K/uL  Mean Cell Volume : 90.5 fL  Mean Cell Hemoglobin : 28.4 pg  Mean Cell Hemoglobin Concentration : 31.4 gm/dL  Auto Neutrophil # : 2.54 K/uL  Auto Lymphocyte # : 1.21 K/uL  Auto Monocyte # : 0.49 K/uL  Auto Eosinophil # : 0.11 K/uL  Auto Basophil # : 0.02 K/uL  Auto Neutrophil % : 58.0 %  Auto Lymphocyte % : 27.6 %  Auto Monocyte % : 11.2 %  Auto Eosinophil % : 2.5 %  Auto Basophil % : 0.5 %    05-03    137  |  100  |  69<H>  ----------------------------<  248<H>  5.0   |  23  |  4.11<H>  05-02    140  |  105  |  55<H>  ----------------------------<  187<H>  4.8   |  22  |  3.27<H>    Ca    9.8      03 May 2021 06:57  Ca    10.0      02 May 2021 06:55  Phos  3.9     05-03  Phos  4.0     05-02  Mg     2.4     05-03  Mg     2.5     05-02      proBNP: Serum Pro-Brain Natriuretic Peptide: 244 pg/mL (05-01 @ 07:31)  Serum Pro-Brain Natriuretic Peptide: 303 pg/mL (04-30 @ 07:41)  Serum Pro-Brain Natriuretic Peptide: 295 pg/mL (04-29 @ 10:24)    < from: CT Head No Cont (04.30.21 @ 11:24) >  Impression:  Unremarkable noncontrast head CT.    If symptoms continue MRI can be done for further evaluation if there are no contraindications.    c< from: US Thyroid (04.30.21 @ 11:04) >  IMPRESSION:    Bilateral thyroid nodules without suspicious features. May obtain follow-up in one year      < from: US Kidney and Bladder (04.30.21 @ 11:01) >    IMPRESSION:    No hydronephrosis.  < from: Xray Chest 1 View- PORTABLE-Urgent (Xray Chest 1 View- PORTABLE-Urgent .) (04.29.21 @ 11:04) >      IMPRESSION:  Clear lungs.          < end of copied text >      < end of copied text >        < end of copied text >       FOLLOW UP:  Heart Failure  HPI:  71F w/ PMHx HTN, HLD, DM2 w/ neuropathy, Recently diagnosed CKD, PAD s/p angiogram RLE (denies any stents, just "clearing out"), thyroid nodules (benign biopsy x2 as per pt) p/w worsening SOB, LE edema, orthopnea, weight gain, and increasing abdominal girth likely 2/2 CHF exacerbation. Also c/o new intermittent double vision (occasionally when watching TV, does not occur otherwise), new dysphagia intermittently.     SUBJECTIVE/OBSERVATIONS:   Patient seen and examined eating her breakfast. Shortness of breath is improving. No complaints of chest pain.  OVERNIGHT EVENTS: creatinine increased to 4.11    TELE EVENTS:   normal sinus rhythm     Vital Signs Last 24 Hrs  T(C): 36.6 (03 May 2021 11:30), Max: 36.7 (02 May 2021 22:11)  T(F): 97.8 (03 May 2021 11:30), Max: 98.1 (02 May 2021 22:11)  HR: 79 (03 May 2021 11:30) (75 - 84)  BP: 138/69 (03 May 2021 11:30) (104/60 - 145/55)  BP(mean): --  RR: 18 (03 May 2021 11:30) (16 - 18)  SpO2: 100% (03 May 2021 11:30) (96% - 100%)  I&O's Summary    02 May 2021 07:01  -  03 May 2021 07:00  --------------------------------------------------------  IN: 232.5 mL / OUT: 1400 mL / NET: -1167.5 mL        MEDICATIONS:  aspirin enteric coated 81 milliGRAM(s) Oral daily  carvedilol 6.25 milliGRAM(s) Oral every 12 hours  heparin   Injectable 5000 Unit(s) SubCutaneous every 8 hours  hydrALAZINE 100 milliGRAM(s) Oral three times a day  isosorbide   dinitrate Tablet (ISORDIL) 10 milliGRAM(s) Oral three times a day  acetaminophen   Tablet .. 650 milliGRAM(s) Oral every 6 hours PRN  gabapentin 300 milliGRAM(s) Oral three times a day  allopurinol 100 milliGRAM(s) Oral daily  atorvastatin 80 milliGRAM(s) Oral at bedtime  dextrose 40% Gel 15 Gram(s) Oral once  dextrose 50% Injectable 25 Gram(s) IV Push once  dextrose 50% Injectable 12.5 Gram(s) IV Push once  dextrose 50% Injectable 25 Gram(s) IV Push once  glucagon  Injectable 1 milliGRAM(s) IntraMuscular once  insulin lispro (ADMELOG) corrective regimen sliding scale   SubCutaneous three times a day before meals  insulin lispro (ADMELOG) corrective regimen sliding scale   SubCutaneous at bedtime  insulin NPH human recombinant 20 Unit(s) SubCutaneous two times a day    dextrose 5%. 1000 milliLiter(s) IV Continuous <Continuous>  dextrose 5%. 1000 milliLiter(s) IV Continuous <Continuous>  ketotifen 0.025% Ophthalmic Solution 1 Drop(s) Both EYES two times a day PRN  multivitamin 1 Tablet(s) Oral daily      REVIEW OF SYSTEMS:  Complete 10point ROS negative.    PHYSICAL EXAM:  General: NAD  Cardiovascular: Normal S1 S2, No JVD, No murmurs, No edema  Respiratory: Lungs clear to auscultation	  Gastrointestinal:  Soft, Non-tender, + BS	  Skin: warm and dry, No rashes, No ecchymoses, No cyanosis	  Extremities: Normal range of motion, No clubbing, cyanosis or edema  Vascular: Peripheral pulses palpable 2+ bilaterally    LABS:	 	    CBC Full  -  ( 03 May 2021 06:57 )  WBC Count : 4.38 K/uL  Hemoglobin : 9.9 g/dL  Hematocrit : 31.5 %  Platelet Count - Automated : 184 K/uL  Mean Cell Volume : 90.5 fL  Mean Cell Hemoglobin : 28.4 pg  Mean Cell Hemoglobin Concentration : 31.4 gm/dL  Auto Neutrophil # : 2.54 K/uL  Auto Lymphocyte # : 1.21 K/uL  Auto Monocyte # : 0.49 K/uL  Auto Eosinophil # : 0.11 K/uL  Auto Basophil # : 0.02 K/uL  Auto Neutrophil % : 58.0 %  Auto Lymphocyte % : 27.6 %  Auto Monocyte % : 11.2 %  Auto Eosinophil % : 2.5 %  Auto Basophil % : 0.5 %    05-03    137  |  100  |  69<H>  ----------------------------<  248<H>  5.0   |  23  |  4.11<H>  05-02    140  |  105  |  55<H>  ----------------------------<  187<H>  4.8   |  22  |  3.27<H>    Ca    9.8      03 May 2021 06:57  Ca    10.0      02 May 2021 06:55  Phos  3.9     05-03  Phos  4.0     05-02  Mg     2.4     05-03  Mg     2.5     05-02      proBNP: Serum Pro-Brain Natriuretic Peptide: 244 pg/mL (05-01 @ 07:31)  Serum Pro-Brain Natriuretic Peptide: 303 pg/mL (04-30 @ 07:41)  Serum Pro-Brain Natriuretic Peptide: 295 pg/mL (04-29 @ 10:24)    < from: CT Head No Cont (04.30.21 @ 11:24) >  Impression:  Unremarkable noncontrast head CT.    If symptoms continue MRI can be done for further evaluation if there are no contraindications.    c< from: US Thyroid (04.30.21 @ 11:04) >  IMPRESSION:    Bilateral thyroid nodules without suspicious features. May obtain follow-up in one year      < from: US Kidney and Bladder (04.30.21 @ 11:01) >    IMPRESSION:    No hydronephrosis.  < from: Xray Chest 1 View- PORTABLE-Urgent (Xray Chest 1 View- PORTABLE-Urgent .) (04.29.21 @ 11:04) >      IMPRESSION:  Clear lungs.          < end of copied text >      < end of copied text >        < end of copied text >

## 2021-05-03 NOTE — PROGRESS NOTE ADULT - PROBLEM SELECTOR PLAN 1
- Presenting with worsening SOB, orthopnea, b/l LE edema, weight gain, increasing abdominal girth, here with bibasilar crackles and JVP concerning for CHF exacerbation  - c/w bumex gtt as per CHF  - TTE pending   - 1.5L fluid restriction, daily weights, I/O   - heart failure consult appreciated

## 2021-05-03 NOTE — PROGRESS NOTE ADULT - ATTENDING COMMENTS
SOB improving. Continues to have edema.  SCr rising to 4.1. Lasix gtt stopped.  Would get RHC to evaluate hemodynamics and need for further diuresis.

## 2021-05-03 NOTE — PROGRESS NOTE ADULT - SUBJECTIVE AND OBJECTIVE BOX
[facility] Division of Hospital Medicine  Latasha Cooper MD  Pager [phone]      Patient is a 71y old  Female who presents with a chief complaint of Worsening SOB (03 May 2021 13:41)      SUBJECTIVE / OVERNIGHT EVENTS:    pt noted to have uptrending Cr. Net neg 1L in 24hr. Reports sob has much improved. Offers no new complaint     ADDITIONAL REVIEW OF SYSTEMS:    RESPIRATORY: No cough, wheezing, chills or hemoptysis; No shortness of breath  CARDIOVASCULAR: No chest pain, palpitations, dizziness, + leg swelling  GASTROINTESTINAL: No abdominal or epigastric pain. No nausea, vomiting, or hematemesis; No diarrhea or constipation. No melena or hematochezia.      MEDICATIONS  (STANDING):  allopurinol 100 milliGRAM(s) Oral daily  aspirin enteric coated 81 milliGRAM(s) Oral daily  atorvastatin 80 milliGRAM(s) Oral at bedtime  carvedilol 6.25 milliGRAM(s) Oral every 12 hours  dextrose 40% Gel 15 Gram(s) Oral once  dextrose 5%. 1000 milliLiter(s) (50 mL/Hr) IV Continuous <Continuous>  dextrose 5%. 1000 milliLiter(s) (100 mL/Hr) IV Continuous <Continuous>  dextrose 50% Injectable 25 Gram(s) IV Push once  dextrose 50% Injectable 12.5 Gram(s) IV Push once  dextrose 50% Injectable 25 Gram(s) IV Push once  gabapentin 300 milliGRAM(s) Oral three times a day  glucagon  Injectable 1 milliGRAM(s) IntraMuscular once  heparin   Injectable 5000 Unit(s) SubCutaneous every 8 hours  hydrALAZINE 100 milliGRAM(s) Oral three times a day  insulin lispro (ADMELOG) corrective regimen sliding scale   SubCutaneous three times a day before meals  insulin lispro (ADMELOG) corrective regimen sliding scale   SubCutaneous at bedtime  insulin NPH human recombinant 20 Unit(s) SubCutaneous two times a day  isosorbide   dinitrate Tablet (ISORDIL) 10 milliGRAM(s) Oral three times a day  multivitamin 1 Tablet(s) Oral daily    MEDICATIONS  (PRN):  acetaminophen   Tablet .. 650 milliGRAM(s) Oral every 6 hours PRN Temp greater or equal to 38C (100.4F), Mild Pain (1 - 3), Moderate Pain (4 - 6)  ketotifen 0.025% Ophthalmic Solution 1 Drop(s) Both EYES two times a day PRN Allergic Conjunctivitis      CAPILLARY BLOOD GLUCOSE      POCT Blood Glucose.: 223 mg/dL (03 May 2021 12:25)  POCT Blood Glucose.: 231 mg/dL (03 May 2021 08:23)  POCT Blood Glucose.: 258 mg/dL (02 May 2021 20:53)  POCT Blood Glucose.: 252 mg/dL (02 May 2021 17:20)    I&O's Summary    02 May 2021 07:01  -  03 May 2021 07:00  --------------------------------------------------------  IN: 232.5 mL / OUT: 1400 mL / NET: -1167.5 mL        PHYSICAL EXAM:  Vital Signs Last 24 Hrs  T(C): 36.6 (03 May 2021 11:30), Max: 36.7 (02 May 2021 22:11)  T(F): 97.8 (03 May 2021 11:30), Max: 98.1 (02 May 2021 22:11)  HR: 79 (03 May 2021 11:30) (75 - 84)  BP: 138/69 (03 May 2021 11:30) (104/60 - 145/55)  BP(mean): --  RR: 18 (03 May 2021 11:30) (16 - 18)  SpO2: 100% (03 May 2021 11:30) (96% - 100%)    CONSTITUTIONAL: NAD,  EYES: PERRLA; conjunctiva and sclera clear  ENMT: Moist oral mucosa, no pharyngeal injection or exudates;   NECK: Supple, no palpable masses;  RESPIRATORY: Normal respiratory effort; lungs are clear to auscultation bilaterally  CARDIOVASCULAR: Regular rate and rhythm, normal S1 and S2, no murmur/rub/gallop;1+ lower extremity edema; Peripheral pulses are 2+ bilaterally  ABDOMEN: Nontender to palpation, normoactive bowel sounds, no rebound/guarding;   MUSCLOSKELETAL:   no clubbing or cyanosis of digits; no joint swelling or tenderness to palpation  PSYCH: A+O to person, place, and time; affect appropriate  NEUROLOGY: CN 2-12 are intact and symmetric; no gross sensory deficits;   SKIN: No rashes;     LABS:                        9.9    4.38  )-----------( 184      ( 03 May 2021 06:57 )             31.5     05-03    137  |  100  |  69<H>  ----------------------------<  248<H>  5.0   |  23  |  4.11<H>    Ca    9.8      03 May 2021 06:57  Phos  3.9     05-03  Mg     2.4     05-03            Urinalysis Basic - ( 02 May 2021 13:53 )    Color: Yellow / Appearance: Clear / S.012 / pH: x  Gluc: x / Ketone: Negative  / Bili: Negative / Urobili: <2 mg/dL   Blood: x / Protein: Trace / Nitrite: Negative   Leuk Esterase: Negative / RBC: 1 /HPF / WBC 1 /HPF   Sq Epi: x / Non Sq Epi: 1 /HPF / Bacteria: Few        GI PCR Panel, Stool (collected 01 May 2021 09:15)  Source: .Stool Feces  Final Report (01 May 2021 15:41):    GI PCR Results: NOT detected    *******Please Note:*******    GI panel PCR evaluates for:    Campylobacter, Plesiomonas shigelloides, Salmonella,    Vibrio, Yersinia enterocolitica, Enteroaggregative    Escherichia coli (EAEC), Enteropathogenic E.coli (EPEC),    Enterotoxigenic E. coli (ETEC) lt/st, Shiga-like    toxin-producing E. coli (STEC) stx1/stx2,    Shigella/ Enteroinvasive E. coli (EIEC), Cryptosporidium,    Cyclospora cayetanensis, Entamoeba histolytica,    Giardia lamblia, Adenovirus F 40/41, Astrovirus,    Norovirus GI/GII, Rotavirus A, Sapovirus        RADIOLOGY & ADDITIONAL TESTS:  Results Reviewed:   Imaging Personally Reviewed:  Electrocardiogram Personally Reviewed:    COORDINATION OF CARE:  Care Discussed with Consultants/Other Providers [Y/N]:  Prior or Outpatient Records Reviewed [Y/N]:

## 2021-05-03 NOTE — PROGRESS NOTE ADULT - PROBLEM SELECTOR PLAN 2
- Cr increasing now to 4, unknown baseline  - renal US -> no hydronephrosis   - c/w bumex gtt  - nephrology following- f/u recs  - avoid nephrotoxins   - monitor BMP

## 2021-05-03 NOTE — PROGRESS NOTE ADULT - PROBLEM SELECTOR PLAN 6
- On NPH 25U BID and novolin R sliding scale (usually end up taking 5U BID with her NPH)  - for now will place on NPH 20U BID (80% of her home dosing), place on ISS, monitor FS qAC  - cont gabapentin for her neuropathy  - c/w atorvastatin,

## 2021-05-04 LAB
ANION GAP SERPL CALC-SCNC: 13 MMOL/L
ANION GAP SERPL CALC-SCNC: 13 MMOL/L — SIGNIFICANT CHANGE UP (ref 7–14)
APTT BLD: 38.2 SEC
BASOPHILS # BLD AUTO: 0.03 K/UL
BASOPHILS NFR BLD AUTO: 0.4 %
BUN SERPL-MCNC: 32 MG/DL
BUN SERPL-MCNC: 80 MG/DL — HIGH (ref 7–23)
CALCIUM SERPL-MCNC: 11 MG/DL
CALCIUM SERPL-MCNC: 9.9 MG/DL — SIGNIFICANT CHANGE UP (ref 8.4–10.5)
CHLORIDE SERPL-SCNC: 102 MMOL/L — SIGNIFICANT CHANGE UP (ref 98–107)
CHLORIDE SERPL-SCNC: 108 MMOL/L
CO2 SERPL-SCNC: 23 MMOL/L — SIGNIFICANT CHANGE UP (ref 22–31)
CO2 SERPL-SCNC: 25 MMOL/L
CREAT SERPL-MCNC: 1.9 MG/DL
CREAT SERPL-MCNC: 4.3 MG/DL — HIGH (ref 0.5–1.3)
EOSINOPHIL # BLD AUTO: 0.18 K/UL
EOSINOPHIL NFR BLD AUTO: 2.5 %
GLUCOSE SERPL-MCNC: 185 MG/DL — HIGH (ref 70–99)
GLUCOSE SERPL-MCNC: 74 MG/DL
HCT VFR BLD CALC: 31.5 % — LOW (ref 34.5–45)
HCT VFR BLD CALC: 38.3 %
HGB BLD-MCNC: 11.6 G/DL
HGB BLD-MCNC: 9.9 G/DL — LOW (ref 11.5–15.5)
IMM GRANULOCYTES NFR BLD AUTO: 0.3 %
LYMPHOCYTES # BLD AUTO: 2.37 K/UL
LYMPHOCYTES NFR BLD AUTO: 32.6 %
MAGNESIUM SERPL-MCNC: 2.3 MG/DL — SIGNIFICANT CHANGE UP (ref 1.6–2.6)
MAN DIFF?: NORMAL
MCHC RBC-ENTMCNC: 28.1 PG
MCHC RBC-ENTMCNC: 28.2 PG — SIGNIFICANT CHANGE UP (ref 27–34)
MCHC RBC-ENTMCNC: 30.3 GM/DL
MCHC RBC-ENTMCNC: 31.4 GM/DL — LOW (ref 32–36)
MCV RBC AUTO: 89.7 FL — SIGNIFICANT CHANGE UP (ref 80–100)
MCV RBC AUTO: 92.7 FL
MONOCYTES # BLD AUTO: 0.49 K/UL
MONOCYTES NFR BLD AUTO: 6.7 %
NEUTROPHILS # BLD AUTO: 4.17 K/UL
NEUTROPHILS NFR BLD AUTO: 57.5 %
NRBC # BLD: 0 /100 WBCS — SIGNIFICANT CHANGE UP
NRBC # FLD: 0 K/UL — SIGNIFICANT CHANGE UP
PHOSPHATE SERPL-MCNC: 4.3 MG/DL — SIGNIFICANT CHANGE UP (ref 2.5–4.5)
PLATELET # BLD AUTO: 182 K/UL — SIGNIFICANT CHANGE UP (ref 150–400)
PLATELET # BLD AUTO: 204 K/UL
POTASSIUM SERPL-MCNC: 4.6 MMOL/L — SIGNIFICANT CHANGE UP (ref 3.5–5.3)
POTASSIUM SERPL-SCNC: 4.6 MMOL/L — SIGNIFICANT CHANGE UP (ref 3.5–5.3)
POTASSIUM SERPL-SCNC: 4.8 MMOL/L
RBC # BLD: 3.51 M/UL — LOW (ref 3.8–5.2)
RBC # BLD: 4.13 M/UL
RBC # FLD: 14.6 %
RBC # FLD: 14.6 % — HIGH (ref 10.3–14.5)
SODIUM SERPL-SCNC: 138 MMOL/L — SIGNIFICANT CHANGE UP (ref 135–145)
SODIUM SERPL-SCNC: 146 MMOL/L
WBC # BLD: 5.19 K/UL — SIGNIFICANT CHANGE UP (ref 3.8–10.5)
WBC # FLD AUTO: 5.19 K/UL — SIGNIFICANT CHANGE UP (ref 3.8–10.5)
WBC # FLD AUTO: 7.26 K/UL

## 2021-05-04 PROCEDURE — 99232 SBSQ HOSP IP/OBS MODERATE 35: CPT

## 2021-05-04 PROCEDURE — 93451 RIGHT HEART CATH: CPT | Mod: 26

## 2021-05-04 PROCEDURE — 99233 SBSQ HOSP IP/OBS HIGH 50: CPT

## 2021-05-04 RX ORDER — METOPROLOL TARTRATE 50 MG
75 TABLET ORAL
Refills: 0 | Status: DISCONTINUED | OUTPATIENT
Start: 2021-05-04 | End: 2021-05-05

## 2021-05-04 RX ORDER — HYDRALAZINE HCL 50 MG
50 TABLET ORAL THREE TIMES A DAY
Refills: 0 | Status: DISCONTINUED | OUTPATIENT
Start: 2021-05-04 | End: 2021-05-17

## 2021-05-04 RX ORDER — BUMETANIDE 0.25 MG/ML
1 INJECTION INTRAMUSCULAR; INTRAVENOUS
Qty: 20 | Refills: 0 | Status: DISCONTINUED | OUTPATIENT
Start: 2021-05-04 | End: 2021-05-09

## 2021-05-04 RX ADMIN — ISOSORBIDE DINITRATE 10 MILLIGRAM(S): 5 TABLET ORAL at 17:12

## 2021-05-04 RX ADMIN — Medication 1 TABLET(S): at 14:38

## 2021-05-04 RX ADMIN — HUMAN INSULIN 20 UNIT(S): 100 INJECTION, SUSPENSION SUBCUTANEOUS at 08:42

## 2021-05-04 RX ADMIN — HUMAN INSULIN 20 UNIT(S): 100 INJECTION, SUSPENSION SUBCUTANEOUS at 18:03

## 2021-05-04 RX ADMIN — GABAPENTIN 300 MILLIGRAM(S): 400 CAPSULE ORAL at 21:49

## 2021-05-04 RX ADMIN — BUMETANIDE 5 MG/HR: 0.25 INJECTION INTRAMUSCULAR; INTRAVENOUS at 18:03

## 2021-05-04 RX ADMIN — Medication 2: at 18:04

## 2021-05-04 RX ADMIN — GABAPENTIN 300 MILLIGRAM(S): 400 CAPSULE ORAL at 14:38

## 2021-05-04 RX ADMIN — CARVEDILOL PHOSPHATE 6.25 MILLIGRAM(S): 80 CAPSULE, EXTENDED RELEASE ORAL at 17:12

## 2021-05-04 RX ADMIN — Medication 50 MILLIGRAM(S): at 21:49

## 2021-05-04 RX ADMIN — Medication 3: at 12:17

## 2021-05-04 RX ADMIN — Medication 1: at 22:12

## 2021-05-04 RX ADMIN — HEPARIN SODIUM 5000 UNIT(S): 5000 INJECTION INTRAVENOUS; SUBCUTANEOUS at 05:07

## 2021-05-04 RX ADMIN — GABAPENTIN 300 MILLIGRAM(S): 400 CAPSULE ORAL at 05:07

## 2021-05-04 RX ADMIN — Medication 75 MILLIGRAM(S): at 20:03

## 2021-05-04 RX ADMIN — CARVEDILOL PHOSPHATE 6.25 MILLIGRAM(S): 80 CAPSULE, EXTENDED RELEASE ORAL at 05:07

## 2021-05-04 RX ADMIN — HEPARIN SODIUM 5000 UNIT(S): 5000 INJECTION INTRAVENOUS; SUBCUTANEOUS at 14:38

## 2021-05-04 RX ADMIN — HEPARIN SODIUM 5000 UNIT(S): 5000 INJECTION INTRAVENOUS; SUBCUTANEOUS at 21:49

## 2021-05-04 RX ADMIN — Medication 100 MILLIGRAM(S): at 05:07

## 2021-05-04 RX ADMIN — Medication 1: at 08:41

## 2021-05-04 RX ADMIN — ATORVASTATIN CALCIUM 80 MILLIGRAM(S): 80 TABLET, FILM COATED ORAL at 21:49

## 2021-05-04 RX ADMIN — Medication 81 MILLIGRAM(S): at 10:25

## 2021-05-04 RX ADMIN — ISOSORBIDE DINITRATE 10 MILLIGRAM(S): 5 TABLET ORAL at 05:08

## 2021-05-04 RX ADMIN — Medication 100 MILLIGRAM(S): at 14:38

## 2021-05-04 NOTE — PROGRESS NOTE ADULT - SUBJECTIVE AND OBJECTIVE BOX
Nephrology Followup Note - [phone] - Dr Thompson / Dr Murdock / Dr Morocho / Dr Kendrick / Dr Magallanes / Dr Alfaro / Dr Escudero / Dr Raymundo  Pt seen and examined in cath recovery, finished RHC.  Pt without complaints. Reports dyspnea and swelling improved.   Ambulated in room without difficulty earlier today.     Allergies:  No Known Allergies    Hospital Medications:   MEDICATIONS  (STANDING):  allopurinol 100 milliGRAM(s) Oral daily  aspirin enteric coated 81 milliGRAM(s) Oral daily  atorvastatin 80 milliGRAM(s) Oral at bedtime  carvedilol 6.25 milliGRAM(s) Oral every 12 hours  dextrose 40% Gel 15 Gram(s) Oral once  dextrose 5%. 1000 milliLiter(s) (50 mL/Hr) IV Continuous <Continuous>  dextrose 5%. 1000 milliLiter(s) (100 mL/Hr) IV Continuous <Continuous>  dextrose 50% Injectable 25 Gram(s) IV Push once  dextrose 50% Injectable 12.5 Gram(s) IV Push once  dextrose 50% Injectable 25 Gram(s) IV Push once  gabapentin 300 milliGRAM(s) Oral three times a day  glucagon  Injectable 1 milliGRAM(s) IntraMuscular once  heparin   Injectable 5000 Unit(s) SubCutaneous every 8 hours  hydrALAZINE 100 milliGRAM(s) Oral three times a day  insulin lispro (ADMELOG) corrective regimen sliding scale   SubCutaneous three times a day before meals  insulin lispro (ADMELOG) corrective regimen sliding scale   SubCutaneous at bedtime  insulin NPH human recombinant 20 Unit(s) SubCutaneous two times a day  isosorbide   dinitrate Tablet (ISORDIL) 10 milliGRAM(s) Oral three times a day  multivitamin 1 Tablet(s) Oral daily    VITALS:  T(F): 98.6 (21 @ 14:30), Max: 98.8 (21 @ 11:59)  HR: 72 (21 @ 14:30)  BP: 138/60 (21 @ 14:30)  RR: 17 (21 @ 14:30)  SpO2: 98% (21 @ 14:30)  Wt(kg): --     @ 07:01  -   @ 07:00  --------------------------------------------------------  IN: 440 mL / OUT: 600 mL / NET: -160 mL     @ 07:01  -   @ 15:00  --------------------------------------------------------  IN: 0 mL / OUT: 300 mL / NET: -300 mL    PHYSICAL EXAM:  Constitutional: NAD  HEENT: anicteric sclera, oropharynx clear, MMM  Neck: No JVD  Respiratory: CTAB, no wheezes, rales or rhonchi  Cardiovascular: S1, S2, RRR  Gastrointestinal: BS+, soft, NT/ND  Extremities: No cyanosis or clubbing. No peripheral edema  Neurological: A/O x 3, no focal deficits  Psychiatric: Normal mood, normal affect  : No CVA tenderness. No paredes.   Skin: No rashes    LABS:      138  |  102  |  80<H>  ----------------------------<  185<H>  4.6   |  23  |  4.30<H>    Ca    9.9      04 May 2021 07:57  Phos  4.3       Mg     2.3           Creatinine Trend: 4.30 <--, 4.11 <--, 3.27 <--, 2.84 <--, 2.43 <--, 2.41 <--                        9.9    5.19  )-----------( 182      ( 04 May 2021 07:57 )             31.5     Urine Studies:  Urinalysis Basic - ( 02 May 2021 13:53 )    Color: Yellow / Appearance: Clear / S.012 / pH:   Gluc:  / Ketone: Negative  / Bili: Negative / Urobili: <2 mg/dL   Blood:  / Protein: Trace / Nitrite: Negative   Leuk Esterase: Negative / RBC: 1 /HPF / WBC 1 /HPF   Sq Epi:  / Non Sq Epi: 1 /HPF / Bacteria: Few      Creatinine, Random Urine: 140 mg/dL ( @ 13:53)  Sodium, Random Urine: 98 mmol/L ( @ 13:53)  Potassium, Random Urine: 30.2 mmol/L ( @ 13:53)  Chloride, Random Urine: 71 mmol/L ( @ 13:53)    RADIOLOGY & ADDITIONAL STUDIES:  < from: US Kidney and Bladder (21 @ 11:01) >    IMPRESSION:    No hydronephrosis.

## 2021-05-04 NOTE — PROGRESS NOTE ADULT - PROBLEM SELECTOR PLAN 1
Pt with CKD, follows with Dr Escudero in outpt clinic.   Baseline Cr ~2 from earlier this year.   Admission cr 2.4, steadily uptrending to 4.3 today.  RADHA may be ATN v over diuresis v AIN. Less likely GN.   Briefly on lasix gtt for suspected decompensated heart failure and volume overload.  No  obstruction on renal US.   No significant proteinuria on UA.  BP relatively stable, no obvious nephrotoxin.   Agree with holding diuretics for now.   Off ACE/ARB.  F/U RHC findings.   Discussed with pt that if cr continues to uptrend, may need to consider dialysis start.   However, electrolytes within normal limits and pt nonologuric.

## 2021-05-04 NOTE — PROGRESS NOTE ADULT - ATTENDING COMMENTS
Diuretics on hold because of azotemia.  RHC today.  Further recs pending results. Diuretics on hold because of azotemia.  RHC today.  Further recs pending results.    Addendum: 5/4/21 RHC: RA 20; PA systolic 69, PAD 21, PA mean 44, PCWP 25-30 with respiratory variation, Hemoglobin 9.9, PA sat 70's, CO 8.4/ CI 4- volume overloaded  Discussed with Dr. Hu at 5pm and recommended  1)decrease hydralazine to 50 mg q 8  hours   2) D/C Coreg and start Toprol XL 75 mg q 12 hours  3) start Bumex gtt at 1 mg/hr  4) give metolazone 5 mg po x 1  5) call for nephrology consult Diuretics on hold because of azotemia.  RHC today.  Further recs pending results.    Addendum: 5/4/21 RHC: RA 20; PA systolic 69, PAD 21, PA mean 44, PCWP 25-30 with respiratory variation, Hemoglobin 9.9, PA sat 70's, CO 8.4/ CI 4- volume overloaded  Discussed with Dr. Hu at 5pm and recommended  1)decrease hydralazine to 50 mg q 8  hours   2) D/C Coreg and start Toprol XL 75 mg q 12 hours  3) start Bumex gtt at 1 mg/hr  4) give metolazone 5 mg po x 1  5) continue nephrology followup

## 2021-05-04 NOTE — PROGRESS NOTE ADULT - ASSESSMENT
71F PMH of DM2 with neuropathy, HTN, CKD, PAD s/p angiogram RLE, HLD, and Thyroid nodules (benign biopsy x2 as per patient) who presents to the hospital with complaints of worsening SOB, LE edema, orthopnea, weight gain, and increasing abdominal girth likely 2/2 CHF exacerbation. Started on lasix gtt. Renal consult for RADHA on CKD.

## 2021-05-04 NOTE — PROGRESS NOTE ADULT - ASSESSMENT
71 y/p female with PMHX of HTN, DM II, HLD, PAD s/p RLE angiogram, CKD comes in with complaints of worsening SOB, LE edema, abdominal distension.   CXR shows clear lungs, LE dopplers show no DVT, CT head unremarkable. Labs significant for BUN/Cr 46/2.43, K 5.2, proBNP 303. She states she was recently told she had a weak heart and has been placed on oral diuretics, but with minimal relief. She admits to 4 pillow orthopnea, BREEN w/ minimal exertion. No PND, CP, palpitations, dizziness. She has had a stress test with her cardiologist Dr. Alas a few months ago, but unclear of the results. Admits to drinking 1 pint of vodka most weekends. Non smoker, no other drug use.     Acute on Chronic Heart Failure Exacerbation:  -Dr. Alas contacted regarding previous cardiac work up  -for medical managment, continue hydral 100 tid with isordil 10 tid  --patient admits to significant amount of ETOH use, vodka, LFTs are normal  -Daily standing weights, and strict I/O.  - Pt having RHC today, further recommendations to follow     71 y/p female with PMHX of HTN, DM II, HLD, PAD s/p RLE angiogram, CKD comes in with complaints of worsening SOB, LE edema, abdominal distension.   CXR shows clear lungs, LE dopplers show no DVT, CT head unremarkable. Labs significant for BUN/Cr 46/2.43, K 5.2, proBNP 303. She states she was recently told she had a weak heart and has been placed on oral diuretics, but with minimal relief. She admits to 4 pillow orthopnea, BREEN w/ minimal exertion. No PND, CP, palpitations, dizziness. She has had a stress test with her cardiologist Dr. Alas a few months ago, but unclear of the results. Admits to drinking 1 pint of vodka most weekends. Non smoker, no other drug use.    5/4/21 RHC: RA 20; PA systolic 69, PAD 21, PA mean 44, PCWP 25-30 with respiratory variation, Hemoglobin 9.9, PA sat 70's, CO 8.4/ CI 4- volume overloaded        Acute on Chronic Heart Failure Exacerbation:  Volume overloaded; 5/4/21 RHC: RA 20; PA systolic 69, PAD 21, PA mean 44, PCWP 25-30 with respiratory variation, Hemoglobin 9.9, PA sat 70's, CO 8.4/ CI 4- volume overloaded  -will discuss plan to restart diuretics with Dr. Hu  - Dr. Alas contacted regarding previous cardiac work up for medical management and faxed prior echo and EKG ( in chart)  - continue hydralazine 100 tid with isordil 10 tid  - patient admits to significant amount of ETOH use, vodka, LFTs are normal  - Daily standing weights, and strict I/O.  - further recommendations to follow

## 2021-05-04 NOTE — PROGRESS NOTE ADULT - PROBLEM SELECTOR PLAN 1
- Presenting with worsening SOB, orthopnea, b/l LE edema, weight gain, increasing abdominal girth, here with bibasilar crackles and JVP concerning for CHF exacerbation  - s/p lasix gtt. diuretics on hold d/t uptrending Cr  -for RHC today. f/u cardiology   - TTE normal LVEF  - 1.5L fluid restriction, daily weights, I/O   - heart failure consult appreciated

## 2021-05-04 NOTE — PROGRESS NOTE ADULT - SUBJECTIVE AND OBJECTIVE BOX
[facility] Division of Hospital Medicine  Ltaasha Cooper MD  Pager [phone]      Patient is a 71y old  Female who presents with a chief complaint of Worsening SOB (04 May 2021 15:00)      SUBJECTIVE / OVERNIGHT EVENTS:    No acute event. Scheduled for RHC today. diuretics on hold     ADDITIONAL REVIEW OF SYSTEMS:    RESPIRATORY: No cough, wheezing, chills or hemoptysis; No shortness of breath  CARDIOVASCULAR: No chest pain, palpitations, dizziness, or leg swelling  GASTROINTESTINAL: No abdominal or epigastric pain. No nausea, vomiting, or hematemesis; No diarrhea or constipation. No melena or hematochezia.      MEDICATIONS  (STANDING):  allopurinol 100 milliGRAM(s) Oral daily  aspirin enteric coated 81 milliGRAM(s) Oral daily  atorvastatin 80 milliGRAM(s) Oral at bedtime  carvedilol 6.25 milliGRAM(s) Oral every 12 hours  dextrose 40% Gel 15 Gram(s) Oral once  dextrose 5%. 1000 milliLiter(s) (50 mL/Hr) IV Continuous <Continuous>  dextrose 5%. 1000 milliLiter(s) (100 mL/Hr) IV Continuous <Continuous>  dextrose 50% Injectable 25 Gram(s) IV Push once  dextrose 50% Injectable 12.5 Gram(s) IV Push once  dextrose 50% Injectable 25 Gram(s) IV Push once  gabapentin 300 milliGRAM(s) Oral three times a day  glucagon  Injectable 1 milliGRAM(s) IntraMuscular once  heparin   Injectable 5000 Unit(s) SubCutaneous every 8 hours  hydrALAZINE 100 milliGRAM(s) Oral three times a day  insulin lispro (ADMELOG) corrective regimen sliding scale   SubCutaneous three times a day before meals  insulin lispro (ADMELOG) corrective regimen sliding scale   SubCutaneous at bedtime  insulin NPH human recombinant 20 Unit(s) SubCutaneous two times a day  isosorbide   dinitrate Tablet (ISORDIL) 10 milliGRAM(s) Oral three times a day  multivitamin 1 Tablet(s) Oral daily    MEDICATIONS  (PRN):  acetaminophen   Tablet .. 650 milliGRAM(s) Oral every 6 hours PRN Temp greater or equal to 38C (100.4F), Mild Pain (1 - 3), Moderate Pain (4 - 6)  ketotifen 0.025% Ophthalmic Solution 1 Drop(s) Both EYES two times a day PRN Allergic Conjunctivitis      CAPILLARY BLOOD GLUCOSE      POCT Blood Glucose.: 274 mg/dL (04 May 2021 12:04)  POCT Blood Glucose.: 193 mg/dL (04 May 2021 08:12)  POCT Blood Glucose.: 195 mg/dL (03 May 2021 22:34)  POCT Blood Glucose.: 246 mg/dL (03 May 2021 17:22)    I&O's Summary    03 May 2021 07:01  -  04 May 2021 07:00  --------------------------------------------------------  IN: 440 mL / OUT: 600 mL / NET: -160 mL    04 May 2021 07:01  -  04 May 2021 15:35  --------------------------------------------------------  IN: 0 mL / OUT: 300 mL / NET: -300 mL        PHYSICAL EXAM:  Vital Signs Last 24 Hrs  T(C): 37 (04 May 2021 14:30), Max: 37.1 (04 May 2021 11:59)  T(F): 98.6 (04 May 2021 14:30), Max: 98.8 (04 May 2021 11:59)  HR: 72 (04 May 2021 14:30) (72 - 88)  BP: 138/60 (04 May 2021 14:30) (129/60 - 148/66)  BP(mean): --  RR: 17 (04 May 2021 14:30) (16 - 17)  SpO2: 98% (04 May 2021 14:30) (96% - 98%)    CONSTITUTIONAL: NAD,  EYES: PERRLA; conjunctiva and sclera clear  ENMT: Moist oral mucosa, no pharyngeal injection or exudates;   NECK: Supple, no palpable masses;  RESPIRATORY: Normal respiratory effort; lungs are clear to auscultation bilaterally  CARDIOVASCULAR: Regular rate and rhythm, normal S1 and S2, no murmur/rub/gallop;1+ lower extremity edema; Peripheral pulses are 2+ bilaterally  ABDOMEN: Nontender to palpation, normoactive bowel sounds, no rebound/guarding;   MUSCLOSKELETAL:   no clubbing or cyanosis of digits; no joint swelling or tenderness to palpation  PSYCH: A+O to person, place, and time; affect appropriate  NEUROLOGY: CN 2-12 are intact and symmetric; no gross sensory deficits;   SKIN: No rashes;     LABS:                        9.9    5.19  )-----------( 182      ( 04 May 2021 07:57 )             31.5     05-04    138  |  102  |  80<H>  ----------------------------<  185<H>  4.6   |  23  |  4.30<H>    Ca    9.9      04 May 2021 07:57  Phos  4.3     05-04  Mg     2.3     05-04                  RADIOLOGY & ADDITIONAL TESTS:  Results Reviewed:   Imaging Personally Reviewed:  Electrocardiogram Personally Reviewed:    COORDINATION OF CARE:  Care Discussed with Consultants/Other Providers [Y/N]:  Prior or Outpatient Records Reviewed [Y/N]:

## 2021-05-04 NOTE — PROGRESS NOTE ADULT - PROBLEM SELECTOR PLAN 2
- Cr increasing now to 4, unknown baseline  - renal US -> no hydronephrosis   - diuretics on hold  - nephrology following- f/u recs  - avoid nephrotoxins   - monitor BMP

## 2021-05-04 NOTE — PROGRESS NOTE ADULT - SUBJECTIVE AND OBJECTIVE BOX
Subjective: Patient seen in cath lab. For RHC    Medications:  acetaminophen   Tablet .. 650 milliGRAM(s) Oral every 6 hours PRN  allopurinol 100 milliGRAM(s) Oral daily  aspirin enteric coated 81 milliGRAM(s) Oral daily  atorvastatin 80 milliGRAM(s) Oral at bedtime  carvedilol 6.25 milliGRAM(s) Oral every 12 hours  dextrose 40% Gel 15 Gram(s) Oral once  dextrose 5%. 1000 milliLiter(s) IV Continuous <Continuous>  dextrose 5%. 1000 milliLiter(s) IV Continuous <Continuous>  dextrose 50% Injectable 25 Gram(s) IV Push once  dextrose 50% Injectable 12.5 Gram(s) IV Push once  dextrose 50% Injectable 25 Gram(s) IV Push once  gabapentin 300 milliGRAM(s) Oral three times a day  glucagon  Injectable 1 milliGRAM(s) IntraMuscular once  heparin   Injectable 5000 Unit(s) SubCutaneous every 8 hours  hydrALAZINE 100 milliGRAM(s) Oral three times a day  insulin lispro (ADMELOG) corrective regimen sliding scale   SubCutaneous three times a day before meals  insulin lispro (ADMELOG) corrective regimen sliding scale   SubCutaneous at bedtime  insulin NPH human recombinant 20 Unit(s) SubCutaneous two times a day  isosorbide   dinitrate Tablet (ISORDIL) 10 milliGRAM(s) Oral three times a day  ketotifen 0.025% Ophthalmic Solution 1 Drop(s) Both EYES two times a day PRN  multivitamin 1 Tablet(s) Oral daily      Physical Exam:    Vitals:  Vital Signs Last 24 Hrs  T(C): 37.1 (04 May 2021 11:59), Max: 37.1 (04 May 2021 11:59)  T(F): 98.8 (04 May 2021 11:59), Max: 98.8 (04 May 2021 11:59)  HR: 72 (04 May 2021 11:59) (72 - 88)  BP: 143/77 (04 May 2021 11:59) (129/60 - 148/66)  BP(mean): --  RR: 17 (04 May 2021 11:59) (16 - 17)  SpO2: 98% (04 May 2021 11:59) (96% - 98%)    Daily     Daily Weight in k.7 (04 May 2021 05:15)    I&O's Summary    03 May 2021 07:01  -  04 May 2021 07:00  --------------------------------------------------------  IN: 440 mL / OUT: 600 mL / NET: -160 mL    04 May 2021 07:01  -  04 May 2021 13:40  --------------------------------------------------------  IN: 0 mL / OUT: 300 mL / NET: -300 mL        Tele:    General: No distress. Comfortable.  HEENT: EOM intact.  Neck: Neck supple. JVP not elevated. No masses  Chest: Clear to auscultation bilaterally  CV: RRR, Normal S1 and S2. No murmurs, rub, or gallops. Radial pulses normal. No LE edema  Abdomen: Soft, non-distended, non-tender  Skin: No rashes or skin breakdown  Neurology: Alert and oriented times three. Sensation intact  Psych: Affect normal    Labs:                        9.9    5.19  )-----------( 182      ( 04 May 2021 07:57 )             31.5     05-    138  |  102  |  80<H>  ----------------------------<  185<H>  4.6   |  23  |  4.30<H>    Ca    9.9      04 May 2021 07:57  Phos  4.3     05-  Mg     2.3     -            Serum Pro-Brain Natriuretic Peptide: 244 pg/mL ( @ 07:31)  Serum Pro-Brain Natriuretic Peptide: 303 pg/mL ( @ 07:41)  Serum Pro-Brain Natriuretic Peptide: 295 pg/mL ( @ 10:24)           Subjective: Patient seen in cath lab. For RHC    Medications:  acetaminophen   Tablet .. 650 milliGRAM(s) Oral every 6 hours PRN  allopurinol 100 milliGRAM(s) Oral daily  aspirin enteric coated 81 milliGRAM(s) Oral daily  atorvastatin 80 milliGRAM(s) Oral at bedtime  carvedilol 6.25 milliGRAM(s) Oral every 12 hours  dextrose 40% Gel 15 Gram(s) Oral once  dextrose 5%. 1000 milliLiter(s) IV Continuous <Continuous>  dextrose 5%. 1000 milliLiter(s) IV Continuous <Continuous>  dextrose 50% Injectable 25 Gram(s) IV Push once  dextrose 50% Injectable 12.5 Gram(s) IV Push once  dextrose 50% Injectable 25 Gram(s) IV Push once  gabapentin 300 milliGRAM(s) Oral three times a day  glucagon  Injectable 1 milliGRAM(s) IntraMuscular once  heparin   Injectable 5000 Unit(s) SubCutaneous every 8 hours  hydrALAZINE 100 milliGRAM(s) Oral three times a day  insulin lispro (ADMELOG) corrective regimen sliding scale   SubCutaneous three times a day before meals  insulin lispro (ADMELOG) corrective regimen sliding scale   SubCutaneous at bedtime  insulin NPH human recombinant 20 Unit(s) SubCutaneous two times a day  isosorbide   dinitrate Tablet (ISORDIL) 10 milliGRAM(s) Oral three times a day  ketotifen 0.025% Ophthalmic Solution 1 Drop(s) Both EYES two times a day PRN  multivitamin 1 Tablet(s) Oral daily    Vital Signs Last 24 Hrs  T(C): 37.1 (04 May 2021 11:59), Max: 37.1 (04 May 2021 11:59)  T(F): 98.8 (04 May 2021 11:59), Max: 98.8 (04 May 2021 11:59)  HR: 72 (04 May 2021 11:59) (72 - 88)  BP: 143/77 (04 May 2021 11:59) (129/60 - 148/66)  BP(mean): --  RR: 17 (04 May 2021 11:59) (16 - 17)  SpO2: 98% (04 May 2021 11:59) (96% - 98%)    Daily     Daily Weight in k.7 (04 May 2021 05:15)    I&O's Summary    03 May 2021 07:01  -  04 May 2021 07:00  --------------------------------------------------------  IN: 440 mL / OUT: 600 mL / NET: -160 mL    04 May 2021 07:01  -  04 May 2021 13:40  --------------------------------------------------------  IN: 0 mL / OUT: 300 mL / NET: -300 mL        Tele: NSR 70's    General: No distress. Comfortable.  HEENT: normocephalic  Neck: Neck supple. JVP difficult to assess due to thick neck  Chest: Clear to auscultation bilaterally  CV: RRR, Normal S1 and S2. Radial pulses normal. tight 1+ LE edema  Abdomen: Softly distended, non-tender  Skin: No rashes or skin breakdown  Neurology: Alert and oriented times three.   Psych: Affect normal    Labs:                        9.9    5.19  )-----------( 182      ( 04 May 2021 07:57 )             31.5     05    138  |  102  |  80<H>  ----------------------------<  185<H>  4.6   |  23  |  4.30<H>    Ca    9.9      04 May 2021 07:57  Phos  4.3       Mg     2.3     04            Serum Pro-Brain Natriuretic Peptide: 244 pg/mL ( @ 07:31)  Serum Pro-Brain Natriuretic Peptide: 303 pg/mL ( @ 07:41)  Serum Pro-Brain Natriuretic Peptide: 295 pg/mL ( @ 10:24)        21 RHC: RA 20; PA systolic 69, PAD 21, PA mean 44, PCWP 25-30 with respiratory variation, Hemoglobin 9.9, PA sat 70's, CO 8.4/ CI 4

## 2021-05-05 LAB
ANION GAP SERPL CALC-SCNC: 10 MMOL/L — SIGNIFICANT CHANGE UP (ref 7–14)
BUN SERPL-MCNC: 80 MG/DL — HIGH (ref 7–23)
CALCIUM SERPL-MCNC: 9.9 MG/DL — SIGNIFICANT CHANGE UP (ref 8.4–10.5)
CHLORIDE SERPL-SCNC: 101 MMOL/L — SIGNIFICANT CHANGE UP (ref 98–107)
CO2 SERPL-SCNC: 23 MMOL/L — SIGNIFICANT CHANGE UP (ref 22–31)
CREAT SERPL-MCNC: 3.99 MG/DL — HIGH (ref 0.5–1.3)
GLUCOSE SERPL-MCNC: 196 MG/DL — HIGH (ref 70–99)
HCT VFR BLD CALC: 29.7 % — LOW (ref 34.5–45)
HGB BLD-MCNC: 9.3 G/DL — LOW (ref 11.5–15.5)
INR PPP: 0.96 RATIO
MAGNESIUM SERPL-MCNC: 2.3 MG/DL — SIGNIFICANT CHANGE UP (ref 1.6–2.6)
MCHC RBC-ENTMCNC: 28.4 PG — SIGNIFICANT CHANGE UP (ref 27–34)
MCHC RBC-ENTMCNC: 31.3 GM/DL — LOW (ref 32–36)
MCV RBC AUTO: 90.5 FL — SIGNIFICANT CHANGE UP (ref 80–100)
NRBC # BLD: 0 /100 WBCS — SIGNIFICANT CHANGE UP
NRBC # FLD: 0 K/UL — SIGNIFICANT CHANGE UP
PHOSPHATE SERPL-MCNC: 4.9 MG/DL — HIGH (ref 2.5–4.5)
PLATELET # BLD AUTO: 174 K/UL — SIGNIFICANT CHANGE UP (ref 150–400)
POTASSIUM SERPL-MCNC: 4.7 MMOL/L — SIGNIFICANT CHANGE UP (ref 3.5–5.3)
POTASSIUM SERPL-SCNC: 4.7 MMOL/L — SIGNIFICANT CHANGE UP (ref 3.5–5.3)
PT BLD: 11.3 SEC
RBC # BLD: 3.28 M/UL — LOW (ref 3.8–5.2)
RBC # FLD: 14.6 % — HIGH (ref 10.3–14.5)
SODIUM SERPL-SCNC: 134 MMOL/L — LOW (ref 135–145)
WBC # BLD: 5.14 K/UL — SIGNIFICANT CHANGE UP (ref 3.8–10.5)
WBC # FLD AUTO: 5.14 K/UL — SIGNIFICANT CHANGE UP (ref 3.8–10.5)

## 2021-05-05 PROCEDURE — 99233 SBSQ HOSP IP/OBS HIGH 50: CPT

## 2021-05-05 RX ORDER — METOPROLOL TARTRATE 50 MG
100 TABLET ORAL
Refills: 0 | Status: DISCONTINUED | OUTPATIENT
Start: 2021-05-05 | End: 2021-05-22

## 2021-05-05 RX ORDER — METOPROLOL TARTRATE 50 MG
100 TABLET ORAL DAILY
Refills: 0 | Status: DISCONTINUED | OUTPATIENT
Start: 2021-05-05 | End: 2021-05-05

## 2021-05-05 RX ADMIN — Medication 2: at 17:47

## 2021-05-05 RX ADMIN — Medication 1: at 22:25

## 2021-05-05 RX ADMIN — HEPARIN SODIUM 5000 UNIT(S): 5000 INJECTION INTRAVENOUS; SUBCUTANEOUS at 15:16

## 2021-05-05 RX ADMIN — Medication 50 MILLIGRAM(S): at 14:29

## 2021-05-05 RX ADMIN — Medication 75 MILLIGRAM(S): at 05:05

## 2021-05-05 RX ADMIN — Medication 100 MILLIGRAM(S): at 17:47

## 2021-05-05 RX ADMIN — HUMAN INSULIN 20 UNIT(S): 100 INJECTION, SUSPENSION SUBCUTANEOUS at 17:48

## 2021-05-05 RX ADMIN — HEPARIN SODIUM 5000 UNIT(S): 5000 INJECTION INTRAVENOUS; SUBCUTANEOUS at 21:28

## 2021-05-05 RX ADMIN — GABAPENTIN 300 MILLIGRAM(S): 400 CAPSULE ORAL at 21:27

## 2021-05-05 RX ADMIN — Medication 1: at 08:46

## 2021-05-05 RX ADMIN — ISOSORBIDE DINITRATE 10 MILLIGRAM(S): 5 TABLET ORAL at 15:19

## 2021-05-05 RX ADMIN — Medication 81 MILLIGRAM(S): at 11:50

## 2021-05-05 RX ADMIN — HEPARIN SODIUM 5000 UNIT(S): 5000 INJECTION INTRAVENOUS; SUBCUTANEOUS at 05:05

## 2021-05-05 RX ADMIN — GABAPENTIN 300 MILLIGRAM(S): 400 CAPSULE ORAL at 14:30

## 2021-05-05 RX ADMIN — HUMAN INSULIN 20 UNIT(S): 100 INJECTION, SUSPENSION SUBCUTANEOUS at 08:46

## 2021-05-05 RX ADMIN — ATORVASTATIN CALCIUM 80 MILLIGRAM(S): 80 TABLET, FILM COATED ORAL at 21:26

## 2021-05-05 RX ADMIN — Medication 1 TABLET(S): at 11:50

## 2021-05-05 RX ADMIN — BUMETANIDE 5 MG/HR: 0.25 INJECTION INTRAMUSCULAR; INTRAVENOUS at 08:45

## 2021-05-05 RX ADMIN — Medication 50 MILLIGRAM(S): at 05:04

## 2021-05-05 RX ADMIN — Medication 100 MILLIGRAM(S): at 11:50

## 2021-05-05 RX ADMIN — ISOSORBIDE DINITRATE 10 MILLIGRAM(S): 5 TABLET ORAL at 05:05

## 2021-05-05 RX ADMIN — Medication 50 MILLIGRAM(S): at 21:26

## 2021-05-05 RX ADMIN — GABAPENTIN 300 MILLIGRAM(S): 400 CAPSULE ORAL at 05:05

## 2021-05-05 RX ADMIN — ISOSORBIDE DINITRATE 10 MILLIGRAM(S): 5 TABLET ORAL at 11:50

## 2021-05-05 RX ADMIN — Medication 2: at 12:39

## 2021-05-05 NOTE — PROGRESS NOTE ADULT - SUBJECTIVE AND OBJECTIVE BOX
[facility] Division of Hospital Medicine  Latasha Cooper MD  Pager [phone]      Patient is a 71y old  Female who presents with a chief complaint of Worsening SOB (05 May 2021 10:10)      SUBJECTIVE / OVERNIGHT EVENTS:    pt reports mild RUE swelling after cath, denies numbness/tingling of R hand. Pulse intact. Back on bumex gtt. SOB improved    ADDITIONAL REVIEW OF SYSTEMS:    RESPIRATORY: No cough, wheezing, chills or hemoptysis; No shortness of breath  CARDIOVASCULAR: No chest pain, palpitations, dizziness, or leg swelling  GASTROINTESTINAL: No abdominal or epigastric pain. No nausea, vomiting, or hematemesis; No diarrhea or constipation. No melena or hematochezia.      MEDICATIONS  (STANDING):  allopurinol 100 milliGRAM(s) Oral daily  aspirin enteric coated 81 milliGRAM(s) Oral daily  atorvastatin 80 milliGRAM(s) Oral at bedtime  buMETAnide Infusion 1 mG/Hr (5 mL/Hr) IV Continuous <Continuous>  dextrose 40% Gel 15 Gram(s) Oral once  dextrose 5%. 1000 milliLiter(s) (50 mL/Hr) IV Continuous <Continuous>  dextrose 5%. 1000 milliLiter(s) (100 mL/Hr) IV Continuous <Continuous>  dextrose 50% Injectable 25 Gram(s) IV Push once  dextrose 50% Injectable 25 Gram(s) IV Push once  dextrose 50% Injectable 12.5 Gram(s) IV Push once  gabapentin 300 milliGRAM(s) Oral three times a day  glucagon  Injectable 1 milliGRAM(s) IntraMuscular once  heparin   Injectable 5000 Unit(s) SubCutaneous every 8 hours  hydrALAZINE 50 milliGRAM(s) Oral three times a day  insulin lispro (ADMELOG) corrective regimen sliding scale   SubCutaneous three times a day before meals  insulin lispro (ADMELOG) corrective regimen sliding scale   SubCutaneous at bedtime  insulin NPH human recombinant 20 Unit(s) SubCutaneous two times a day  isosorbide   dinitrate Tablet (ISORDIL) 10 milliGRAM(s) Oral three times a day  metolazone 5 milliGRAM(s) Oral once  metoprolol succinate  milliGRAM(s) Oral daily  multivitamin 1 Tablet(s) Oral daily    MEDICATIONS  (PRN):  acetaminophen   Tablet .. 650 milliGRAM(s) Oral every 6 hours PRN Temp greater or equal to 38C (100.4F), Mild Pain (1 - 3), Moderate Pain (4 - 6)  ketotifen 0.025% Ophthalmic Solution 1 Drop(s) Both EYES two times a day PRN Allergic Conjunctivitis      CAPILLARY BLOOD GLUCOSE      POCT Blood Glucose.: 239 mg/dL (05 May 2021 12:23)  POCT Blood Glucose.: 182 mg/dL (05 May 2021 08:29)  POCT Blood Glucose.: 266 mg/dL (04 May 2021 22:03)  POCT Blood Glucose.: 249 mg/dL (04 May 2021 17:40)    I&O's Summary    04 May 2021 07:01  -  05 May 2021 07:00  --------------------------------------------------------  IN: 65 mL / OUT: 1700 mL / NET: -1635 mL    05 May 2021 07:01  -  05 May 2021 14:45  --------------------------------------------------------  IN: 210 mL / OUT: 1000 mL / NET: -790 mL        PHYSICAL EXAM:  Vital Signs Last 24 Hrs  T(C): 36.6 (05 May 2021 11:41), Max: 36.8 (04 May 2021 21:48)  T(F): 97.9 (05 May 2021 11:41), Max: 98.2 (04 May 2021 21:48)  HR: 62 (05 May 2021 14:28) (62 - 85)  BP: 163/69 (05 May 2021 14:28) (136/45 - 165/67)  BP(mean): --  RR: 17 (05 May 2021 11:41) (16 - 17)  SpO2: 98% (05 May 2021 11:41) (97% - 98%)    CONSTITUTIONAL: NAD,  EYES: PERRLA; conjunctiva and sclera clear  ENMT: Moist oral mucosa, no pharyngeal injection or exudates;   NECK: Supple, no palpable masses;  RESPIRATORY: Normal respiratory effort; lungs are clear to auscultation bilaterally  CARDIOVASCULAR: Regular rate and rhythm, normal S1 and S2, no murmur/rub/gallop;1+ lower extremity edema; Peripheral pulses are 2+ bilaterally  ABDOMEN: Nontender to palpation, normoactive bowel sounds, no rebound/guarding;   MUSCLOSKELETAL:   no clubbing or cyanosis of digits; no joint swelling or tenderness to palpation  PSYCH: A+O to person, place, and time; affect appropriate  NEUROLOGY: CN 2-12 are intact and symmetric; no gross sensory deficits;   SKIN: No rashes;     LABS:                        9.3    5.14  )-----------( 174      ( 05 May 2021 03:22 )             29.7     05-05    134<L>  |  101  |  80<H>  ----------------------------<  196<H>  4.7   |  23  |  3.99<H>    Ca    9.9      05 May 2021 03:22  Phos  4.9     05-05  Mg     2.3     05-05                  RADIOLOGY & ADDITIONAL TESTS:  Results Reviewed:   Imaging Personally Reviewed:  Electrocardiogram Personally Reviewed:    COORDINATION OF CARE:  Care Discussed with Consultants/Other Providers [Y/N]:  Prior or Outpatient Records Reviewed [Y/N]:

## 2021-05-05 NOTE — PROGRESS NOTE ADULT - PROBLEM SELECTOR PLAN 2
- Cr increasing now to 4, unknown baseline  - renal US -> no hydronephrosis   - diuretics as above  - nephrology following- f/u recs  - avoid nephrotoxins   - monitor BMP

## 2021-05-05 NOTE — PROGRESS NOTE ADULT - ASSESSMENT
71 y/p female with PMHX of HTN, DM II, HLD, PAD s/p RLE angiogram, CKD comes in with complaints of worsening SOB, LE edema, abdominal distension.   CXR shows clear lungs, LE dopplers show no DVT, CT head unremarkable. Labs significant for BUN/Cr 46/2.43, K 5.2, proBNP 303. She states she was recently told she had a weak heart and has been placed on oral diuretics, but with minimal relief. She admits to 4 pillow orthopnea, BREEN w/ minimal exertion. No PND, CP, palpitations, dizziness. She has had a stress test with her cardiologist Dr. Alas a few months ago, but unclear of the results. Admits to drinking 1 pint of vodka most weekends. Non smoker, no other drug use.    5/4/21 RHC: RA 20; PA systolic 69, PAD 21, PA mean 44, PCWP 25-30 with respiratory variation, Hemoglobin 9.9, PA sat 70's, CO 8.4/ CI 4- volume overloaded        Acute on Chronic Heart Failure Exacerbation:  Volume overloaded; 5/4/21 RHC: RA 20; PA systolic 69, PAD 21, PA mean 44, PCWP 25-30 with respiratory variation, Hemoglobin 9.9, PA sat 70's, CO 8.4/ CI 4- volume overloaded  -Continue Bumex 1 mg/hr.   - Dr. Alas contacted regarding previous cardiac work up for medical management and faxed prior echo and EKG ( in chart)  - continue hydralazine 100 tid with isordil 10 tid  -As d/w Dr. Hu increase toprol to 100 mg po BID and add metolazone 5 mg po x 1.  - patient admits to significant amount of ETOH use, vodka, LFTs are normal  - Daily standing weights, and strict I/O.

## 2021-05-05 NOTE — PROGRESS NOTE ADULT - SUBJECTIVE AND OBJECTIVE BOX
Medications:  acetaminophen   Tablet .. 650 milliGRAM(s) Oral every 6 hours PRN  allopurinol 100 milliGRAM(s) Oral daily  aspirin enteric coated 81 milliGRAM(s) Oral daily  atorvastatin 80 milliGRAM(s) Oral at bedtime  buMETAnide Infusion 1 mG/Hr IV Continuous <Continuous>  dextrose 40% Gel 15 Gram(s) Oral once  dextrose 5%. 1000 milliLiter(s) IV Continuous <Continuous>  dextrose 5%. 1000 milliLiter(s) IV Continuous <Continuous>  dextrose 50% Injectable 25 Gram(s) IV Push once  dextrose 50% Injectable 12.5 Gram(s) IV Push once  dextrose 50% Injectable 25 Gram(s) IV Push once  gabapentin 300 milliGRAM(s) Oral three times a day  glucagon  Injectable 1 milliGRAM(s) IntraMuscular once  heparin   Injectable 5000 Unit(s) SubCutaneous every 8 hours  hydrALAZINE 50 milliGRAM(s) Oral three times a day  insulin lispro (ADMELOG) corrective regimen sliding scale   SubCutaneous three times a day before meals  insulin lispro (ADMELOG) corrective regimen sliding scale   SubCutaneous at bedtime  insulin NPH human recombinant 20 Unit(s) SubCutaneous two times a day  isosorbide   dinitrate Tablet (ISORDIL) 10 milliGRAM(s) Oral three times a day  ketotifen 0.025% Ophthalmic Solution 1 Drop(s) Both EYES two times a day PRN  metoprolol succinate ER 75 milliGRAM(s) Oral two times a day  multivitamin 1 Tablet(s) Oral daily      Vitals:  Vital Signs Last 24 Hrs  T(C): 36.6 (05 May 2021 05:42), Max: 37.1 (04 May 2021 11:59)  T(F): 97.9 (05 May 2021 05:42), Max: 98.8 (04 May 2021 11:59)  HR: 85 (05 May 2021 05:42) (72 - 85)  BP: 139/57 (05 May 2021 05:42) (129/60 - 149/55)  BP(mean): --  RR: 16 (05 May 2021 05:42) (16 - 17)  SpO2: 98% (05 May 2021 05:42) (97% - 98%)    Daily     Daily Weight in k.1 (05 May 2021 05:25)    I&O's Detail    04 May 2021 07:01  -  05 May 2021 07:00  --------------------------------------------------------  IN:    Bumetanide: 65 mL  Total IN: 65 mL    OUT:    Voided (mL): 1700 mL  Total OUT: 1700 mL    Total NET: -1635 mL      05 May 2021 07:01  -  05 May 2021 10:10  --------------------------------------------------------  IN:    Bumetanide: 10 mL    Oral Fluid: 200 mL  Total IN: 210 mL    OUT:    Estimated Blood Loss (mL): 1000 mL  Total OUT: 1000 mL    Total NET: -790 mL          Physical Exam:     General: No distress. Comfortable.  HEENT: EOM intact.  Neck: Neck supple. JVP not elevated. No masses  Chest: Clear to auscultation bilaterally  CV: Normal S1 and S2. No murmurs, rub, or gallops. Radial pulses normal.  Abdomen: Soft, non-distended, non-tender  Skin: No rashes or skin breakdown  Neurology: Alert and oriented times three. Sensation intact  Psych: Affect normal    Labs:                        9.3    5.14  )-----------( 174      ( 05 May 2021 03:22 )             29.7     05-05    134<L>  |  101  |  80<H>  ----------------------------<  196<H>  4.7   |  23  |  3.99<H>    Ca    9.9      05 May 2021 03:22  Phos  4.9     05-05  Mg     2.3     05-05             Patient seen and examined. She states she is feeling better- no sob at rest, and is voiding much more now.   No CP.   Medications:  acetaminophen   Tablet .. 650 milliGRAM(s) Oral every 6 hours PRN  allopurinol 100 milliGRAM(s) Oral daily  aspirin enteric coated 81 milliGRAM(s) Oral daily  atorvastatin 80 milliGRAM(s) Oral at bedtime  buMETAnide Infusion 1 mG/Hr IV Continuous <Continuous>  dextrose 40% Gel 15 Gram(s) Oral once  dextrose 5%. 1000 milliLiter(s) IV Continuous <Continuous>  dextrose 5%. 1000 milliLiter(s) IV Continuous <Continuous>  dextrose 50% Injectable 25 Gram(s) IV Push once  dextrose 50% Injectable 12.5 Gram(s) IV Push once  dextrose 50% Injectable 25 Gram(s) IV Push once  gabapentin 300 milliGRAM(s) Oral three times a day  glucagon  Injectable 1 milliGRAM(s) IntraMuscular once  heparin   Injectable 5000 Unit(s) SubCutaneous every 8 hours  hydrALAZINE 50 milliGRAM(s) Oral three times a day  insulin lispro (ADMELOG) corrective regimen sliding scale   SubCutaneous three times a day before meals  insulin lispro (ADMELOG) corrective regimen sliding scale   SubCutaneous at bedtime  insulin NPH human recombinant 20 Unit(s) SubCutaneous two times a day  isosorbide   dinitrate Tablet (ISORDIL) 10 milliGRAM(s) Oral three times a day  ketotifen 0.025% Ophthalmic Solution 1 Drop(s) Both EYES two times a day PRN  metoprolol succinate ER 75 milliGRAM(s) Oral two times a day  multivitamin 1 Tablet(s) Oral daily      Vitals:  Vital Signs Last 24 Hrs  T(C): 36.6 (05 May 2021 05:42), Max: 37.1 (04 May 2021 11:59)  T(F): 97.9 (05 May 2021 05:42), Max: 98.8 (04 May 2021 11:59)  HR: 85 (05 May 2021 05:42) (72 - 85)  BP: 139/57 (05 May 2021 05:42) (129/60 - 149/55)  BP(mean): --  RR: 16 (05 May 2021 05:42) (16 - 17)  SpO2: 98% (05 May 2021 05:42) (97% - 98%)    Daily     Daily Weight in k.1 (05 May 2021 05:25)    I&O's Detail    04 May 2021 07:01  -  05 May 2021 07:00  --------------------------------------------------------  IN:    Bumetanide: 65 mL  Total IN: 65 mL    OUT:    Voided (mL): 1700 mL  Total OUT: 1700 mL    Total NET: -1635 mL      05 May 2021 07:01  -  05 May 2021 10:10  --------------------------------------------------------  IN:    Bumetanide: 10 mL    Oral Fluid: 200 mL  Total IN: 210 mL    OUT:    Estimated Blood Loss (mL): 1000 mL  Total OUT: 1000 mL    Total NET: -790 mL          Physical Exam:     General: No distress. Comfortable.  HEENT: EOM intact.  Neck: Neck supple. JVP elevated. No masses  Chest: Clear to auscultation bilaterally  CV: Normal S1 and S2. No murmurs, rub, or gallops. Radial pulses normal. Trace b/l LE edema. Warm b/l,   Abdomen: Soft, non-distended, non-tender  Skin: No rashes or skin breakdown  Neurology: Alert and oriented times three. Sensation intact  Psych: Affect normal    Labs:                        9.3    5.14  )-----------( 174      ( 05 May 2021 03:22 )             29.7     05-05    134<L>  |  101  |  80<H>  ----------------------------<  196<H>  4.7   |  23  |  3.99<H>    Ca    9.9      05 May 2021 03:22  Phos  4.9     05-05  Mg     2.3     05-05

## 2021-05-05 NOTE — PROGRESS NOTE ADULT - PROBLEM SELECTOR PLAN 1
- Presenting with worsening SOB, orthopnea, b/l LE edema, weight gain, increasing abdominal girth, here with bibasilar crackles and JVP concerning for CHF exacerbation  - s/p RHC 5/4 c/w severe pulm HTN  - TTE normal LVEF  - bumex gtt restarted as per CHF, metolazone 5 x 1  - 1.5L fluid restriction, daily weights, I/O   - heart failure consult appreciated

## 2021-05-05 NOTE — CHART NOTE - NSCHARTNOTEFT_GEN_A_CORE
Called by RN - re: right hand edema.  Pt complained of right hand swelling. Pt s/p cardiac cath via right brachial.  Site dry and intact, no changes to brachial site as stated by RN and patient. Right arm warm to touch with positive distal pulses.     Vital Signs Last 24 Hrs  T(C): 36.8 (04 May 2021 21:48), Max: 37.1 (04 May 2021 11:59)  T(F): 98.2 (04 May 2021 21:48), Max: 98.8 (04 May 2021 11:59)  HR: 84 (04 May 2021 21:48) (72 - 84)  BP: 149/55 (04 May 2021 21:48) (129/60 - 149/55)  BP(mean): --  RR: 16 (04 May 2021 21:48) (16 - 17)  SpO2: 97% (04 May 2021 21:48) (97% - 98%)      - keep right hand elevated above the heart.   - continue to monitor

## 2021-05-05 NOTE — PROGRESS NOTE ADULT - PROBLEM SELECTOR PLAN 1
Pt with CKD3, follows with Dr Escudero in outpt clinic.   Baseline Cr ~2 from earlier this year.   Admission cr 2.4, steadily uptrended, peaked at 4.3 yesterday > 3.99 today.  RADHA may be ATN v over diuresis v AIN. Less likely GN.   Briefly on lasix gtt for suspected decompensated heart failure and volume overload.  No  obstruction on renal US.   No significant proteinuria on UA.  BP stable, no obvious nephrotoxin.   Off ACEi/ARB.  electrolytes within normal limits and pt nonologuric.  no indication for dialysis at this time  HF/cardiology eval noted. 5/4/21 RHC: RA 20; PA systolic 69, PAD 21, PA mean 44, PCWP 25-30 with respiratory variation, Hemoglobin 9.9, PA sat 70's, CO 8.4/ CI 4- volume overloaded. started on bumex drip @5ml/hr-as per HF team  monitor daily BMP  dose all meds for eGFR<15ml/min.   avoid ACEi/ARB/NSAIDs/Nephrotoxics.

## 2021-05-05 NOTE — PROGRESS NOTE ADULT - ASSESSMENT
Renal following for RADHA/CKD Mx.        labs, rad, chart reviewed  [facility]  Office [phone]  Ans Serv [phone]  Cell - [phone]

## 2021-05-05 NOTE — PROGRESS NOTE ADULT - SUBJECTIVE AND OBJECTIVE BOX
[facility] - S Collette / Donna S /D Aroldo/ S Elpidio/ ROSALIA Morocho/ Ben Escudero / URIEL Groveu/ O Dominic  service -[phone], office [phone]  ---------------------------------------------------------------------------------------------------------------    Patient seen and examined bedside    Subjective and Objective: No overnight events, sob resolved. No complaints today. feeling better    Allergies: No Known Allergies      Hospital Medications:   MEDICATIONS  (STANDING):  allopurinol 100 milliGRAM(s) Oral daily  aspirin enteric coated 81 milliGRAM(s) Oral daily  atorvastatin 80 milliGRAM(s) Oral at bedtime  buMETAnide Infusion 1 mG/Hr (5 mL/Hr) IV Continuous <Continuous>  dextrose 40% Gel 15 Gram(s) Oral once  dextrose 5%. 1000 milliLiter(s) (50 mL/Hr) IV Continuous <Continuous>  dextrose 5%. 1000 milliLiter(s) (100 mL/Hr) IV Continuous <Continuous>  dextrose 50% Injectable 25 Gram(s) IV Push once  dextrose 50% Injectable 12.5 Gram(s) IV Push once  dextrose 50% Injectable 25 Gram(s) IV Push once  gabapentin 300 milliGRAM(s) Oral three times a day  glucagon  Injectable 1 milliGRAM(s) IntraMuscular once  heparin   Injectable 5000 Unit(s) SubCutaneous every 8 hours  hydrALAZINE 50 milliGRAM(s) Oral three times a day  insulin lispro (ADMELOG) corrective regimen sliding scale   SubCutaneous three times a day before meals  insulin lispro (ADMELOG) corrective regimen sliding scale   SubCutaneous at bedtime  insulin NPH human recombinant 20 Unit(s) SubCutaneous two times a day  isosorbide   dinitrate Tablet (ISORDIL) 10 milliGRAM(s) Oral three times a day  metoprolol succinate  milliGRAM(s) Oral two times a day  multivitamin 1 Tablet(s) Oral daily      REVIEW OF SYSTEMS:  CONSTITUTIONAL: No weakness, fevers or chills  EYES/ENT: No visual changes;  No vertigo or throat pain   NECK: No pain or stiffness  RESPIRATORY: No cough, wheezing, hemoptysis; No shortness of breath  CARDIOVASCULAR: No chest pain or palpitations.  GASTROINTESTINAL: No abdominal or epigastric pain. No nausea, vomiting, or hematemesis; No diarrhea or constipation. No melena or hematochezia.  GENITOURINARY: No dysuria, frequency, foamy urine, urinary urgency, incontinence or hematuria  NEUROLOGICAL: No numbness or weakness  SKIN: No itching, burning, rashes, or lesions   VASCULAR: No bilateral lower extremity edema.   All other review of systems is negative unless indicated above.    VITALS:  T(F): 98.2 (21 @ 15:17), Max: 98.2 (21 @ 21:48)  HR: 66 (21 @ 17:47)  BP: 146/54 (21 @ 17:47)  RR: 17 (21 @ 15:17)  SpO2: 99% (21 @ 15:17)  Wt(kg): --     @ 07:01  -   @ 07:00  --------------------------------------------------------  IN: 65 mL / OUT: 1700 mL / NET: -1635 mL     @ 07:01  -   @ 17:53  --------------------------------------------------------  IN: 250 mL / OUT: 1800 mL / NET: -1550 mL          PHYSICAL EXAM:  Constitutional: NAD  HEENT: anicteric sclera, oropharynx clear  Neck: No JVD  Respiratory: CTAB, no wheezes, rales or rhonchi  Cardiovascular: S1, S2, RRR  Gastrointestinal: BS+, soft, NT/ND  Extremities: No cyanosis or clubbing. No peripheral edema  Neurological: A/O x 3, no focal deficits  Psychiatric: Normal mood, normal affect  : No CVA tenderness. No paredes.   Skin: No rashes  Vascular Access:    LABS:      134<L>  |  101  |  80<H>  ----------------------------<  196<H>  4.7   |  23  |  3.99<H>    Ca    9.9      05 May 2021 03:22  Phos  4.9       Mg     2.3           Creatinine Trend: 3.99 <--, 4.30 <--, 4.11 <--, 3.27 <--, 2.84 <--, 2.43 <--, 2.41 <--                        9.3    5.14  )-----------( 174      ( 05 May 2021 03:22 )             29.7     Urine Studies:  Urinalysis Basic - ( 02 May 2021 13:53 )    Color: Yellow / Appearance: Clear / S.012 / pH:   Gluc:  / Ketone: Negative  / Bili: Negative / Urobili: <2 mg/dL   Blood:  / Protein: Trace / Nitrite: Negative   Leuk Esterase: Negative / RBC: 1 /HPF / WBC 1 /HPF   Sq Epi:  / Non Sq Epi: 1 /HPF / Bacteria: Few      Creatinine, Random Urine: 140 mg/dL ( @ 13:53)  Sodium, Random Urine: 98 mmol/L ( @ 13:53)  Potassium, Random Urine: 30.2 mmol/L ( @ 13:53)  Chloride, Random Urine: 71 mmol/L ( @ 13:53)      RADIOLOGY & ADDITIONAL STUDIES:   [facility] - S Collette / Donna S /D Aroldo/ S Elpidio/ S Rashawn/ Ben Escudero / URIEL Groveu/ O Dominic  service -[phone], office [phone]  ---------------------------------------------------------------------------------------------------------------    Patient seen and examined bedside    Subjective and Objective: No overnight events, denied V/D/sob. No complaints today.   s/p RHC . started on bumex drip last night. reports inc in u/o today    Allergies: No Known Allergies      Hospital Medications:   MEDICATIONS  (STANDING):  allopurinol 100 milliGRAM(s) Oral daily  aspirin enteric coated 81 milliGRAM(s) Oral daily  atorvastatin 80 milliGRAM(s) Oral at bedtime  buMETAnide Infusion 1 mG/Hr (5 mL/Hr) IV Continuous <Continuous>  dextrose 40% Gel 15 Gram(s) Oral once  dextrose 5%. 1000 milliLiter(s) (50 mL/Hr) IV Continuous <Continuous>  dextrose 5%. 1000 milliLiter(s) (100 mL/Hr) IV Continuous <Continuous>  dextrose 50% Injectable 25 Gram(s) IV Push once  dextrose 50% Injectable 12.5 Gram(s) IV Push once  dextrose 50% Injectable 25 Gram(s) IV Push once  gabapentin 300 milliGRAM(s) Oral three times a day  glucagon  Injectable 1 milliGRAM(s) IntraMuscular once  heparin   Injectable 5000 Unit(s) SubCutaneous every 8 hours  hydrALAZINE 50 milliGRAM(s) Oral three times a day  insulin lispro (ADMELOG) corrective regimen sliding scale   SubCutaneous three times a day before meals  insulin lispro (ADMELOG) corrective regimen sliding scale   SubCutaneous at bedtime  insulin NPH human recombinant 20 Unit(s) SubCutaneous two times a day  isosorbide   dinitrate Tablet (ISORDIL) 10 milliGRAM(s) Oral three times a day  metoprolol succinate  milliGRAM(s) Oral two times a day  multivitamin 1 Tablet(s) Oral daily      VITALS:  T(F): 98.2 (21 @ 15:17), Max: 98.2 (21 @ 21:48)  HR: 66 (21 @ 17:47)  BP: 146/54 (21 @ 17:47)  RR: 17 (21 @ 15:17)  SpO2: 99% (21 @ 15:17)  Wt(kg): --     @ 07:01  -   @ 07:00  --------------------------------------------------------  IN: 65 mL / OUT: 1700 mL / NET: -1635 mL     @ 07:01  -   @ 17:53  --------------------------------------------------------  IN: 250 mL / OUT: 1800 mL / NET: -1550 mL      PHYSICAL EXAM:  Constitutional: NAD  HEENT: anicteric sclera  Neck: No JVD  Respiratory: CTAB, no wheezes, rales or rhonchi  Cardiovascular: S1, S2, RRR  Gastrointestinal: BS+, soft, NT  Extremities: No peripheral edema  Neurological: A/O x 3  Psychiatric: Normal mood, normal affect  : No paredes.     LABS:      134<L>  |  101  |  80<H>  ----------------------------<  196<H>  4.7   |  23  |  3.99<H>    Ca    9.9      05 May 2021 03:22  Phos  4.9       Mg     2.3     -      Creatinine Trend: 3.99 <--, 4.30 <--, 4.11 <--, 3.27 <--, 2.84 <--, 2.43 <--, 2.41 <--                        9.3    5.14  )-----------( 174      ( 05 May 2021 03:22 )             29.7     Urine Studies:  Urinalysis Basic - ( 02 May 2021 13:53 )    Color: Yellow / Appearance: Clear / S.012 / pH:   Gluc:  / Ketone: Negative  / Bili: Negative / Urobili: <2 mg/dL   Blood:  / Protein: Trace / Nitrite: Negative   Leuk Esterase: Negative / RBC: 1 /HPF / WBC 1 /HPF   Sq Epi:  / Non Sq Epi: 1 /HPF / Bacteria: Few      Creatinine, Random Urine: 140 mg/dL ( @ 13:53)  Sodium, Random Urine: 98 mmol/L ( @ 13:53)  Potassium, Random Urine: 30.2 mmol/L ( @ 13:53)  Chloride, Random Urine: 71 mmol/L ( @ 13:53)      RADIOLOGY & ADDITIONAL STUDIES:

## 2021-05-06 DIAGNOSIS — I50.33 ACUTE ON CHRONIC DIASTOLIC (CONGESTIVE) HEART FAILURE: ICD-10-CM

## 2021-05-06 LAB
ANION GAP SERPL CALC-SCNC: 12 MMOL/L — SIGNIFICANT CHANGE UP (ref 7–14)
BUN SERPL-MCNC: 95 MG/DL — HIGH (ref 7–23)
CALCIUM SERPL-MCNC: 10.2 MG/DL — SIGNIFICANT CHANGE UP (ref 8.4–10.5)
CHLORIDE SERPL-SCNC: 99 MMOL/L — SIGNIFICANT CHANGE UP (ref 98–107)
CO2 SERPL-SCNC: 27 MMOL/L — SIGNIFICANT CHANGE UP (ref 22–31)
CREAT SERPL-MCNC: 3.96 MG/DL — HIGH (ref 0.5–1.3)
GLUCOSE SERPL-MCNC: 196 MG/DL — HIGH (ref 70–99)
HCT VFR BLD CALC: 33.2 % — LOW (ref 34.5–45)
HGB BLD-MCNC: 10.6 G/DL — LOW (ref 11.5–15.5)
MAGNESIUM SERPL-MCNC: 2.3 MG/DL — SIGNIFICANT CHANGE UP (ref 1.6–2.6)
MCHC RBC-ENTMCNC: 28.5 PG — SIGNIFICANT CHANGE UP (ref 27–34)
MCHC RBC-ENTMCNC: 31.9 GM/DL — LOW (ref 32–36)
MCV RBC AUTO: 89.2 FL — SIGNIFICANT CHANGE UP (ref 80–100)
NRBC # BLD: 0 /100 WBCS — SIGNIFICANT CHANGE UP
NRBC # FLD: 0 K/UL — SIGNIFICANT CHANGE UP
PHOSPHATE SERPL-MCNC: 5 MG/DL — HIGH (ref 2.5–4.5)
PLATELET # BLD AUTO: 182 K/UL — SIGNIFICANT CHANGE UP (ref 150–400)
POTASSIUM SERPL-MCNC: 4.9 MMOL/L — SIGNIFICANT CHANGE UP (ref 3.5–5.3)
POTASSIUM SERPL-SCNC: 4.9 MMOL/L — SIGNIFICANT CHANGE UP (ref 3.5–5.3)
RBC # BLD: 3.72 M/UL — LOW (ref 3.8–5.2)
RBC # FLD: 14.3 % — SIGNIFICANT CHANGE UP (ref 10.3–14.5)
SODIUM SERPL-SCNC: 138 MMOL/L — SIGNIFICANT CHANGE UP (ref 135–145)
WBC # BLD: 5.22 K/UL — SIGNIFICANT CHANGE UP (ref 3.8–10.5)
WBC # FLD AUTO: 5.22 K/UL — SIGNIFICANT CHANGE UP (ref 3.8–10.5)

## 2021-05-06 PROCEDURE — 99233 SBSQ HOSP IP/OBS HIGH 50: CPT

## 2021-05-06 RX ADMIN — Medication 1: at 09:01

## 2021-05-06 RX ADMIN — Medication 1 TABLET(S): at 13:24

## 2021-05-06 RX ADMIN — Medication 100 MILLIGRAM(S): at 13:25

## 2021-05-06 RX ADMIN — HEPARIN SODIUM 5000 UNIT(S): 5000 INJECTION INTRAVENOUS; SUBCUTANEOUS at 05:59

## 2021-05-06 RX ADMIN — Medication 50 MILLIGRAM(S): at 05:59

## 2021-05-06 RX ADMIN — Medication 50 MILLIGRAM(S): at 13:47

## 2021-05-06 RX ADMIN — HEPARIN SODIUM 5000 UNIT(S): 5000 INJECTION INTRAVENOUS; SUBCUTANEOUS at 22:00

## 2021-05-06 RX ADMIN — GABAPENTIN 300 MILLIGRAM(S): 400 CAPSULE ORAL at 22:00

## 2021-05-06 RX ADMIN — HUMAN INSULIN 20 UNIT(S): 100 INJECTION, SUSPENSION SUBCUTANEOUS at 05:59

## 2021-05-06 RX ADMIN — ISOSORBIDE DINITRATE 10 MILLIGRAM(S): 5 TABLET ORAL at 05:59

## 2021-05-06 RX ADMIN — Medication 100 MILLIGRAM(S): at 17:59

## 2021-05-06 RX ADMIN — HUMAN INSULIN 20 UNIT(S): 100 INJECTION, SUSPENSION SUBCUTANEOUS at 17:58

## 2021-05-06 RX ADMIN — ISOSORBIDE DINITRATE 10 MILLIGRAM(S): 5 TABLET ORAL at 11:16

## 2021-05-06 RX ADMIN — GABAPENTIN 300 MILLIGRAM(S): 400 CAPSULE ORAL at 05:59

## 2021-05-06 RX ADMIN — Medication 3: at 13:22

## 2021-05-06 RX ADMIN — Medication 81 MILLIGRAM(S): at 13:25

## 2021-05-06 RX ADMIN — Medication 50 MILLIGRAM(S): at 22:00

## 2021-05-06 RX ADMIN — ISOSORBIDE DINITRATE 10 MILLIGRAM(S): 5 TABLET ORAL at 16:42

## 2021-05-06 RX ADMIN — Medication 2: at 17:49

## 2021-05-06 RX ADMIN — Medication 100 MILLIGRAM(S): at 05:59

## 2021-05-06 RX ADMIN — GABAPENTIN 300 MILLIGRAM(S): 400 CAPSULE ORAL at 13:47

## 2021-05-06 RX ADMIN — ATORVASTATIN CALCIUM 80 MILLIGRAM(S): 80 TABLET, FILM COATED ORAL at 22:00

## 2021-05-06 RX ADMIN — BUMETANIDE 5 MG/HR: 0.25 INJECTION INTRAMUSCULAR; INTRAVENOUS at 22:01

## 2021-05-06 RX ADMIN — HEPARIN SODIUM 5000 UNIT(S): 5000 INJECTION INTRAVENOUS; SUBCUTANEOUS at 13:25

## 2021-05-06 NOTE — PROGRESS NOTE ADULT - ASSESSMENT
71F PMH of DM2 with neuropathy, HTN, CKD, PAD s/p angiogram RLE, HLD, and Thyroid nodules (benign biopsy x2 as per patient) who presents to the hospital with complaints of worsening SOB, LE edema, orthopnea, weight gain, and increasing abdominal girth likely 2/2 CHF exacerbation. Started on bumex gtt. Renal consult for RADHA on CKD.

## 2021-05-06 NOTE — PROGRESS NOTE ADULT - SUBJECTIVE AND OBJECTIVE BOX
[facility] Division of Hospital Medicine  Latasha Cooper MD  Pager [phone]      Patient is a 71y old  Female who presents with a chief complaint of Worsening SOB (05 May 2021 17:51)      SUBJECTIVE / OVERNIGHT EVENTS:    No acute event. reports leg edema is improving. denies chest pain, sob     ADDITIONAL REVIEW OF SYSTEMS:    RESPIRATORY: No cough, wheezing, chills or hemoptysis; No shortness of breath  CARDIOVASCULAR: No chest pain, palpitations, dizziness, or leg swelling  GASTROINTESTINAL: No abdominal or epigastric pain. No nausea, vomiting, or hematemesis; No diarrhea or constipation. No melena or hematochezia.      MEDICATIONS  (STANDING):  allopurinol 100 milliGRAM(s) Oral daily  aspirin enteric coated 81 milliGRAM(s) Oral daily  atorvastatin 80 milliGRAM(s) Oral at bedtime  buMETAnide Infusion 1 mG/Hr (5 mL/Hr) IV Continuous <Continuous>  dextrose 40% Gel 15 Gram(s) Oral once  dextrose 5%. 1000 milliLiter(s) (50 mL/Hr) IV Continuous <Continuous>  dextrose 5%. 1000 milliLiter(s) (100 mL/Hr) IV Continuous <Continuous>  dextrose 50% Injectable 25 Gram(s) IV Push once  dextrose 50% Injectable 12.5 Gram(s) IV Push once  dextrose 50% Injectable 25 Gram(s) IV Push once  gabapentin 300 milliGRAM(s) Oral three times a day  glucagon  Injectable 1 milliGRAM(s) IntraMuscular once  heparin   Injectable 5000 Unit(s) SubCutaneous every 8 hours  hydrALAZINE 50 milliGRAM(s) Oral three times a day  insulin lispro (ADMELOG) corrective regimen sliding scale   SubCutaneous three times a day before meals  insulin lispro (ADMELOG) corrective regimen sliding scale   SubCutaneous at bedtime  insulin NPH human recombinant 20 Unit(s) SubCutaneous two times a day  isosorbide   dinitrate Tablet (ISORDIL) 10 milliGRAM(s) Oral three times a day  metoprolol succinate  milliGRAM(s) Oral two times a day  multivitamin 1 Tablet(s) Oral daily    MEDICATIONS  (PRN):  acetaminophen   Tablet .. 650 milliGRAM(s) Oral every 6 hours PRN Temp greater or equal to 38C (100.4F), Mild Pain (1 - 3), Moderate Pain (4 - 6)  ketotifen 0.025% Ophthalmic Solution 1 Drop(s) Both EYES two times a day PRN Allergic Conjunctivitis      CAPILLARY BLOOD GLUCOSE      POCT Blood Glucose.: 189 mg/dL (06 May 2021 08:43)  POCT Blood Glucose.: 200 mg/dL (06 May 2021 05:46)  POCT Blood Glucose.: 259 mg/dL (05 May 2021 22:17)  POCT Blood Glucose.: 236 mg/dL (05 May 2021 17:32)    I&O's Summary    05 May 2021 07:01  -  06 May 2021 07:00  --------------------------------------------------------  IN: 370 mL / OUT: 3200 mL / NET: -2830 mL        PHYSICAL EXAM:  Vital Signs Last 24 Hrs  T(C): 36.5 (06 May 2021 12:23), Max: 36.9 (05 May 2021 21:25)  T(F): 97.7 (06 May 2021 12:23), Max: 98.5 (05 May 2021 21:25)  HR: 67 (06 May 2021 11:13) (62 - 71)  BP: 141/64 (06 May 2021 11:13) (130/60 - 163/69)  BP(mean): --  RR: 17 (06 May 2021 11:13) (16 - 17)  SpO2: 97% (06 May 2021 11:13) (97% - 99%)    CONSTITUTIONAL: NAD,  EYES: PERRLA; conjunctiva and sclera clear  ENMT: Moist oral mucosa, no pharyngeal injection or exudates;   NECK: Supple, no palpable masses;  RESPIRATORY: Normal respiratory effort; lungs are clear to auscultation bilaterally  CARDIOVASCULAR: Regular rate and rhythm, normal S1 and S2, no murmur/rub/gallop;1+ lower extremity edema; Peripheral pulses are 2+ bilaterally  ABDOMEN: Nontender to palpation, normoactive bowel sounds, no rebound/guarding;   MUSCLOSKELETAL:   no clubbing or cyanosis of digits; no joint swelling or tenderness to palpation  PSYCH: A+O to person, place, and time; affect appropriate  NEUROLOGY: CN 2-12 are intact and symmetric; no gross sensory deficits;   SKIN: No rashes;       LABS:                        10.6   5.22  )-----------( 182      ( 06 May 2021 06:49 )             33.2     05-06    138  |  99  |  95<H>  ----------------------------<  196<H>  4.9   |  27  |  3.96<H>    Ca    10.2      06 May 2021 06:49  Phos  5.0     05-06  Mg     2.3     05-06                  RADIOLOGY & ADDITIONAL TESTS:  Results Reviewed:   Imaging Personally Reviewed:  Electrocardiogram Personally Reviewed:    COORDINATION OF CARE:  Care Discussed with Consultants/Other Providers [Y/N]:  Prior or Outpatient Records Reviewed [Y/N]:

## 2021-05-06 NOTE — PROGRESS NOTE ADULT - SUBJECTIVE AND OBJECTIVE BOX
Nephrology Followup Note - [phone] - Dr Thompson / Dr Murdock / Dr Morocho / Dr Kendrick / Dr Magallanes / Dr Alfaro / Dr Escudero / Dr Raymundo  Pt seen and examined at bedside  Pt sitting up in chair. Reports dyspnea much improved.     Allergies:  No Known Allergies    Hospital Medications:   MEDICATIONS  (STANDING):  allopurinol 100 milliGRAM(s) Oral daily  aspirin enteric coated 81 milliGRAM(s) Oral daily  atorvastatin 80 milliGRAM(s) Oral at bedtime  buMETAnide Infusion 1 mG/Hr (5 mL/Hr) IV Continuous <Continuous>  dextrose 40% Gel 15 Gram(s) Oral once  dextrose 5%. 1000 milliLiter(s) (50 mL/Hr) IV Continuous <Continuous>  dextrose 5%. 1000 milliLiter(s) (100 mL/Hr) IV Continuous <Continuous>  dextrose 50% Injectable 25 Gram(s) IV Push once  dextrose 50% Injectable 12.5 Gram(s) IV Push once  dextrose 50% Injectable 25 Gram(s) IV Push once  gabapentin 300 milliGRAM(s) Oral three times a day  glucagon  Injectable 1 milliGRAM(s) IntraMuscular once  heparin   Injectable 5000 Unit(s) SubCutaneous every 8 hours  hydrALAZINE 50 milliGRAM(s) Oral three times a day  insulin lispro (ADMELOG) corrective regimen sliding scale   SubCutaneous three times a day before meals  insulin lispro (ADMELOG) corrective regimen sliding scale   SubCutaneous at bedtime  insulin NPH human recombinant 20 Unit(s) SubCutaneous two times a day  isosorbide   dinitrate Tablet (ISORDIL) 10 milliGRAM(s) Oral three times a day  metoprolol succinate  milliGRAM(s) Oral two times a day  multivitamin 1 Tablet(s) Oral daily    VITALS:  T(F): 97.7 (21 @ 12:23), Max: 98.5 (21 @ 21:25)  HR: 65 (21 @ 13:45)  BP: 136/65 (21 @ 13:45)  RR: 17 (21 @ 11:13)  SpO2: 97% (21 @ 11:13)  Wt(kg): --     @ 07:01  -   @ 07:00  --------------------------------------------------------  IN: 370 mL / OUT: 3200 mL / NET: -2830 mL        PHYSICAL EXAM:  Constitutional: NAD  HEENT: anicteric sclera, oropharynx clear, MMM  Neck: +JVD  Respiratory: CTAB, no wheezes, rales or rhonchi  Cardiovascular: S1, S2, RRR  Gastrointestinal: BS+, soft, NT/ND  Extremities: No cyanosis or clubbing. Trace LE peripheral edema  Neurological: A/O x 3, no focal deficits  Psychiatric: Normal mood, normal affect  : No CVA tenderness. No paredes.   Skin: No rashes    LABS:      138  |  99  |  95<H>  ----------------------------<  196<H>  4.9   |  27  |  3.96<H>    Ca    10.2      06 May 2021 06:49  Phos  5.0       Mg     2.3     -06      Creatinine Trend: 3.96 <--, 3.99 <--, 4.30 <--, 4.11 <--, 3.27 <--, 2.84 <--, 2.43 <--                        10.6   5.22  )-----------( 182      ( 06 May 2021 06:49 )             33.2     Urine Studies:  Urinalysis Basic - ( 02 May 2021 13:53 )    Color: Yellow / Appearance: Clear / S.012 / pH:   Gluc:  / Ketone: Negative  / Bili: Negative / Urobili: <2 mg/dL   Blood:  / Protein: Trace / Nitrite: Negative   Leuk Esterase: Negative / RBC: 1 /HPF / WBC 1 /HPF   Sq Epi:  / Non Sq Epi: 1 /HPF / Bacteria: Few      Creatinine, Random Urine: 140 mg/dL ( @ 13:53)  Sodium, Random Urine: 98 mmol/L ( @ 13:53)  Potassium, Random Urine: 30.2 mmol/L ( @ 13:53)  Chloride, Random Urine: 71 mmol/L ( @ 13:53)    RADIOLOGY & ADDITIONAL STUDIES:  < from: US Kidney and Bladder (21 @ 11:01) >  IMPRESSION:    No hydronephrosis.    < end of copied text >

## 2021-05-06 NOTE — PROGRESS NOTE ADULT - SUBJECTIVE AND OBJECTIVE BOX
Patient seen and examined. She is sitting up comfortably in chair. Lost 5 lbs in 24 hrs. Diuresing well.   Denies SOB at rest, states she feels a lot better.   No CP, palpitations, dizziness.       Medications:  acetaminophen   Tablet .. 650 milliGRAM(s) Oral every 6 hours PRN  allopurinol 100 milliGRAM(s) Oral daily  aspirin enteric coated 81 milliGRAM(s) Oral daily  atorvastatin 80 milliGRAM(s) Oral at bedtime  buMETAnide Infusion 1 mG/Hr IV Continuous <Continuous>  dextrose 40% Gel 15 Gram(s) Oral once  dextrose 5%. 1000 milliLiter(s) IV Continuous <Continuous>  dextrose 5%. 1000 milliLiter(s) IV Continuous <Continuous>  dextrose 50% Injectable 25 Gram(s) IV Push once  dextrose 50% Injectable 12.5 Gram(s) IV Push once  dextrose 50% Injectable 25 Gram(s) IV Push once  gabapentin 300 milliGRAM(s) Oral three times a day  glucagon  Injectable 1 milliGRAM(s) IntraMuscular once  heparin   Injectable 5000 Unit(s) SubCutaneous every 8 hours  hydrALAZINE 50 milliGRAM(s) Oral three times a day  insulin lispro (ADMELOG) corrective regimen sliding scale   SubCutaneous three times a day before meals  insulin lispro (ADMELOG) corrective regimen sliding scale   SubCutaneous at bedtime  insulin NPH human recombinant 20 Unit(s) SubCutaneous two times a day  isosorbide   dinitrate Tablet (ISORDIL) 10 milliGRAM(s) Oral three times a day  ketotifen 0.025% Ophthalmic Solution 1 Drop(s) Both EYES two times a day PRN  metoprolol succinate  milliGRAM(s) Oral two times a day  multivitamin 1 Tablet(s) Oral daily      Vitals:  Vital Signs Last 24 Hrs  T(C): 36.5 (06 May 2021 12:23), Max: 36.9 (05 May 2021 21:25)  T(F): 97.7 (06 May 2021 12:23), Max: 98.5 (05 May 2021 21:25)  HR: 67 (06 May 2021 11:13) (62 - 71)  BP: 141/64 (06 May 2021 11:13) (130/60 - 163/69)  BP(mean): --  RR: 17 (06 May 2021 11:13) (16 - 17)  SpO2: 97% (06 May 2021 11:13) (97% - 99%)    Daily     Daily Weight in k.5 (06 May 2021 07:48)    I&O's Detail    05 May 2021 07:01  -  06 May 2021 07:00  --------------------------------------------------------  IN:    Bumetanide: 50 mL    Oral Fluid: 320 mL  Total IN: 370 mL    OUT:    Voided (mL): 3200 mL  Total OUT: 3200 mL    Total NET: -2830 mL          Physical Exam:     General: No distress. Comfortable.  HEENT: EOM intact.  Neck: Neck supple. JVP  elevated. No masses  Chest: Clear to auscultation bilaterally  CV: Normal S1 and S2. No murmurs, rub, or gallops. Radial pulses normal. 2+ b/l LE edema. Warm b/l.   Abdomen: Soft, non-distended, non-tender  Skin: No rashes or skin breakdown  Neurology: Alert and oriented times three. Sensation intact  Psych: Affect normal    Labs:                        10.6   5.22  )-----------( 182      ( 06 May 2021 06:49 )             33.2     05-06    138  |  99  |  95<H>  ----------------------------<  196<H>  4.9   |  27  |  3.96<H>    Ca    10.2      06 May 2021 06:49  Phos  5.0     05-06  Mg     2.3     05-06

## 2021-05-06 NOTE — PROGRESS NOTE ADULT - ASSESSMENT
71 y/p female with PMHX of HTN, DM II, HLD, PAD s/p RLE angiogram, CKD comes in with complaints of worsening SOB, LE edema, abdominal distension.   CXR shows clear lungs, LE dopplers show no DVT, CT head unremarkable. Labs significant for BUN/Cr 46/2.43, K 5.2, proBNP 303. She states she was recently told she had a weak heart and has been placed on oral diuretics, but with minimal relief. She admits to 4 pillow orthopnea, BREEN w/ minimal exertion. No PND, CP, palpitations, dizziness. She has had a stress test with her cardiologist Dr. Alas a few months ago, but unclear of the results. Admits to drinking 1 pint of vodka most weekends. Non smoker, no other drug use.    5/4/21 RHC: RA 20; PA systolic 69, PAD 21, PA mean 44, PCWP 25-30 with respiratory variation, Hemoglobin 9.9, PA sat 70's, CO 8.4/ CI 4- volume overloaded        Moderately hypervolemic.

## 2021-05-06 NOTE — PROGRESS NOTE ADULT - PROBLEM SELECTOR PLAN 1
-Moderately hypervolemic.   -Diuresed -2.8 L and has lost 5 lbs in 24 hrs. However still has 2+ b/l LE edema.   -Continue Bumex 1 mg/hr. Will likely give more metolazone today.   - continue hydralazine 50 tid with isordil 10 tid  -Continue Toprol 100 mg po BID.   -Keep k 4.0-5.0 and mag 2.0.   -Daily standing weights and strict I/O.

## 2021-05-06 NOTE — PROGRESS NOTE ADULT - PROBLEM SELECTOR PLAN 1
- Presenting with worsening SOB, orthopnea, b/l LE edema, weight gain, increasing abdominal girth, here with bibasilar crackles and JVP concerning for CHF exacerbation  - s/p RHC 5/4 c/w severe pulm HTN  - TTE normal LVEF  - bumex gtt restarted as per CHF, s/p metolazone 5 x 2  - 1.5L fluid restriction, daily weights, I/O   - heart failure consult appreciated

## 2021-05-06 NOTE — PROGRESS NOTE ADULT - PROBLEM SELECTOR PLAN 1
Pt with CKD3, follows with Dr Escudero in outpt clinic.   Baseline Cr ~2 from earlier this year.   Admission cr 2.4, peaked at 4.3, and now plateaued ~4.0.   RADHA may be ATN v cardiorenal syndrome. Less likely GN.   s/p RHC, showing elevated pressueres, so restarted on bumex gtt.   No  obstruction on renal US.   No significant proteinuria on UA.  BP stable, no obvious nephrotoxin.   Nonologuric, and responsive to diuretics.   no indication for dialysis at this time  monitor daily BMP  dose all meds for eGFR<15ml/min.   avoid ACEi/ARB/NSAIDs/Nephrotoxics.

## 2021-05-07 LAB
ANION GAP SERPL CALC-SCNC: 14 MMOL/L — SIGNIFICANT CHANGE UP (ref 7–14)
BUN SERPL-MCNC: 106 MG/DL — HIGH (ref 7–23)
CALCIUM SERPL-MCNC: 10.4 MG/DL — SIGNIFICANT CHANGE UP (ref 8.4–10.5)
CHLORIDE SERPL-SCNC: 91 MMOL/L — LOW (ref 98–107)
CO2 SERPL-SCNC: 26 MMOL/L — SIGNIFICANT CHANGE UP (ref 22–31)
CREAT SERPL-MCNC: 3.8 MG/DL — HIGH (ref 0.5–1.3)
GLUCOSE SERPL-MCNC: 303 MG/DL — HIGH (ref 70–99)
HCT VFR BLD CALC: 31.9 % — LOW (ref 34.5–45)
HGB BLD-MCNC: 10.7 G/DL — LOW (ref 11.5–15.5)
MAGNESIUM SERPL-MCNC: 2.2 MG/DL — SIGNIFICANT CHANGE UP (ref 1.6–2.6)
MCHC RBC-ENTMCNC: 28.8 PG — SIGNIFICANT CHANGE UP (ref 27–34)
MCHC RBC-ENTMCNC: 33.5 GM/DL — SIGNIFICANT CHANGE UP (ref 32–36)
MCV RBC AUTO: 85.8 FL — SIGNIFICANT CHANGE UP (ref 80–100)
NRBC # BLD: 0 /100 WBCS — SIGNIFICANT CHANGE UP
NRBC # FLD: 0 K/UL — SIGNIFICANT CHANGE UP
PHOSPHATE SERPL-MCNC: 4.8 MG/DL — HIGH (ref 2.5–4.5)
PLATELET # BLD AUTO: 203 K/UL — SIGNIFICANT CHANGE UP (ref 150–400)
POTASSIUM SERPL-MCNC: 5.1 MMOL/L — SIGNIFICANT CHANGE UP (ref 3.5–5.3)
POTASSIUM SERPL-SCNC: 5.1 MMOL/L — SIGNIFICANT CHANGE UP (ref 3.5–5.3)
RBC # BLD: 3.72 M/UL — LOW (ref 3.8–5.2)
RBC # FLD: 14.1 % — SIGNIFICANT CHANGE UP (ref 10.3–14.5)
SODIUM SERPL-SCNC: 131 MMOL/L — LOW (ref 135–145)
WBC # BLD: 5.49 K/UL — SIGNIFICANT CHANGE UP (ref 3.8–10.5)
WBC # FLD AUTO: 5.49 K/UL — SIGNIFICANT CHANGE UP (ref 3.8–10.5)

## 2021-05-07 PROCEDURE — 99233 SBSQ HOSP IP/OBS HIGH 50: CPT

## 2021-05-07 RX ORDER — HUMAN INSULIN 100 [IU]/ML
25 INJECTION, SUSPENSION SUBCUTANEOUS
Refills: 0 | Status: DISCONTINUED | OUTPATIENT
Start: 2021-05-07 | End: 2021-05-08

## 2021-05-07 RX ADMIN — Medication 4: at 18:32

## 2021-05-07 RX ADMIN — HEPARIN SODIUM 5000 UNIT(S): 5000 INJECTION INTRAVENOUS; SUBCUTANEOUS at 05:47

## 2021-05-07 RX ADMIN — BUMETANIDE 5 MG/HR: 0.25 INJECTION INTRAMUSCULAR; INTRAVENOUS at 19:07

## 2021-05-07 RX ADMIN — ISOSORBIDE DINITRATE 10 MILLIGRAM(S): 5 TABLET ORAL at 18:33

## 2021-05-07 RX ADMIN — Medication 100 MILLIGRAM(S): at 12:54

## 2021-05-07 RX ADMIN — Medication 1 TABLET(S): at 14:27

## 2021-05-07 RX ADMIN — ATORVASTATIN CALCIUM 80 MILLIGRAM(S): 80 TABLET, FILM COATED ORAL at 22:26

## 2021-05-07 RX ADMIN — Medication 81 MILLIGRAM(S): at 12:54

## 2021-05-07 RX ADMIN — Medication 100 MILLIGRAM(S): at 19:06

## 2021-05-07 RX ADMIN — Medication 100 MILLIGRAM(S): at 05:48

## 2021-05-07 RX ADMIN — GABAPENTIN 300 MILLIGRAM(S): 400 CAPSULE ORAL at 05:47

## 2021-05-07 RX ADMIN — HUMAN INSULIN 20 UNIT(S): 100 INJECTION, SUSPENSION SUBCUTANEOUS at 09:10

## 2021-05-07 RX ADMIN — Medication 3: at 22:26

## 2021-05-07 RX ADMIN — GABAPENTIN 300 MILLIGRAM(S): 400 CAPSULE ORAL at 14:28

## 2021-05-07 RX ADMIN — ISOSORBIDE DINITRATE 10 MILLIGRAM(S): 5 TABLET ORAL at 12:54

## 2021-05-07 RX ADMIN — GABAPENTIN 300 MILLIGRAM(S): 400 CAPSULE ORAL at 22:26

## 2021-05-07 RX ADMIN — Medication 50 MILLIGRAM(S): at 05:47

## 2021-05-07 RX ADMIN — HEPARIN SODIUM 5000 UNIT(S): 5000 INJECTION INTRAVENOUS; SUBCUTANEOUS at 22:30

## 2021-05-07 RX ADMIN — Medication 50 MILLIGRAM(S): at 14:28

## 2021-05-07 RX ADMIN — Medication 50 MILLIGRAM(S): at 22:26

## 2021-05-07 RX ADMIN — ISOSORBIDE DINITRATE 10 MILLIGRAM(S): 5 TABLET ORAL at 05:48

## 2021-05-07 RX ADMIN — HUMAN INSULIN 25 UNIT(S): 100 INJECTION, SUSPENSION SUBCUTANEOUS at 18:33

## 2021-05-07 RX ADMIN — Medication 4: at 09:09

## 2021-05-07 RX ADMIN — HEPARIN SODIUM 5000 UNIT(S): 5000 INJECTION INTRAVENOUS; SUBCUTANEOUS at 14:31

## 2021-05-07 RX ADMIN — Medication 5: at 12:53

## 2021-05-07 NOTE — PROGRESS NOTE ADULT - PROBLEM SELECTOR PLAN 1
Pt with CKD3, follows with Dr Escudero in outpt clinic.   Baseline Cr ~2 from earlier this year.   Admission cr 2.4, peaked at 4.3, and now plateaued ~4.0.   RADHA may be ATN v cardiorenal syndrome. Less likely GN.   s/p RHC, showing elevated pressueres, so restarted on bumex gtt.   No  obstruction on renal US.   No significant proteinuria on UA.  BP stable, no obvious nephrotoxin.   Nonologuric, and responsive to diuretics.   >3L urine output noted, over past 24 hours.   Bumex titration as per HF team.   no indication for dialysis at this time  monitor daily BMP  dose all meds for eGFR<15ml/min.   avoid ACEi/ARB/NSAIDs/Nephrotoxics.

## 2021-05-07 NOTE — PROGRESS NOTE ADULT - PROBLEM SELECTOR PLAN 1
-Moderately hypervolemic.   -Diuresed 1925 in 24 hrs, no input recorded. However still has 2+ b/l LE edema. Please check weight today.   -Continue Bumex 1 mg/hr. Will likely give more metolazone today.   - continue hydralazine 50 tid with isordil 10 tid  -Continue Toprol 100 mg po BID.   -Keep k 4.0-5.0 and mag 2.0.   -Daily standing weights and strict I/O.

## 2021-05-07 NOTE — PROGRESS NOTE ADULT - SUBJECTIVE AND OBJECTIVE BOX
Patient seen and examined. She feels like her SOB is improving each day. However says her UO is not as robust now.   No SOB at rest, CP, palpitations.       Medications:  acetaminophen   Tablet .. 650 milliGRAM(s) Oral every 6 hours PRN  allopurinol 100 milliGRAM(s) Oral daily  aspirin enteric coated 81 milliGRAM(s) Oral daily  atorvastatin 80 milliGRAM(s) Oral at bedtime  buMETAnide Infusion 1 mG/Hr IV Continuous <Continuous>  dextrose 40% Gel 15 Gram(s) Oral once  dextrose 5%. 1000 milliLiter(s) IV Continuous <Continuous>  dextrose 5%. 1000 milliLiter(s) IV Continuous <Continuous>  dextrose 50% Injectable 25 Gram(s) IV Push once  dextrose 50% Injectable 12.5 Gram(s) IV Push once  dextrose 50% Injectable 25 Gram(s) IV Push once  gabapentin 300 milliGRAM(s) Oral three times a day  glucagon  Injectable 1 milliGRAM(s) IntraMuscular once  heparin   Injectable 5000 Unit(s) SubCutaneous every 8 hours  hydrALAZINE 50 milliGRAM(s) Oral three times a day  insulin lispro (ADMELOG) corrective regimen sliding scale   SubCutaneous three times a day before meals  insulin lispro (ADMELOG) corrective regimen sliding scale   SubCutaneous at bedtime  insulin NPH human recombinant 20 Unit(s) SubCutaneous two times a day  isosorbide   dinitrate Tablet (ISORDIL) 10 milliGRAM(s) Oral three times a day  ketotifen 0.025% Ophthalmic Solution 1 Drop(s) Both EYES two times a day PRN  metoprolol succinate  milliGRAM(s) Oral two times a day  multivitamin 1 Tablet(s) Oral daily      Vitals:  Vital Signs Last 24 Hrs  T(C): 36.7 (07 May 2021 05:42), Max: 37.1 (06 May 2021 21:56)  T(F): 98.1 (07 May 2021 05:42), Max: 98.7 (06 May 2021 21:56)  HR: 69 (07 May 2021 05:42) (65 - 70)  BP: 135/66 (07 May 2021 05:42) (125/57 - 137/64)  BP(mean): --  RR: 18 (07 May 2021 05:42) (17 - 18)  SpO2: 96% (07 May 2021 05:42) (96% - 97%)    Daily     Daily Weight in k (07 May 2021 10:56)    I&O's Detail    06 May 2021 07:01  -  07 May 2021 07:00  --------------------------------------------------------  IN:  Total IN: 0 mL    OUT:    Voided (mL): 1925 mL  Total OUT: 1925 mL    Total NET: -1925 mL      07 May 2021 07:01  -  07 May 2021 11:40  --------------------------------------------------------  IN:    Oral Fluid: 240 mL  Total IN: 240 mL    OUT:  Total OUT: 0 mL    Total NET: 240 mL          Physical Exam:     General: No distress. Comfortable.  HEENT: EOM intact.  Neck: Neck supple. JVP elevated. No masses  Chest: Clear to auscultation bilaterally  CV: Normal S1 and S2. No murmurs, rub, or gallops. Radial pulses normal. 2+ b/l LE edema b/l. Warm b/l.   Abdomen: Soft, non-distended, non-tender  Skin: No rashes or skin breakdown  Neurology: Alert and oriented times three. Sensation intact  Psych: Affect normal    Labs:                        10.7   5.49  )-----------( 203      ( 07 May 2021 07:32 )             31.9     05-07    131<L>  |  91<L>  |  106<H>  ----------------------------<  303<H>  5.1   |  26  |  3.80<H>    Ca    10.4      07 May 2021 07:32  Phos  4.8     05-07  Mg     2.2     05-07

## 2021-05-07 NOTE — PROGRESS NOTE ADULT - SUBJECTIVE AND OBJECTIVE BOX
Nephrology Followup Note - [phone] - Dr Thompson / Dr Murdock / Dr Morocho / Dr Kendrick / Dr Magallanes / Dr Alfaro / Dr Escudero / Dr Raymundo  Pt seen and examined at bedside  No acute events overnight. No complaints.     Allergies:  No Known Allergies    Hospital Medications:   MEDICATIONS  (STANDING):  allopurinol 100 milliGRAM(s) Oral daily  aspirin enteric coated 81 milliGRAM(s) Oral daily  atorvastatin 80 milliGRAM(s) Oral at bedtime  buMETAnide Infusion 1 mG/Hr (5 mL/Hr) IV Continuous <Continuous>  dextrose 40% Gel 15 Gram(s) Oral once  dextrose 5%. 1000 milliLiter(s) (50 mL/Hr) IV Continuous <Continuous>  dextrose 5%. 1000 milliLiter(s) (100 mL/Hr) IV Continuous <Continuous>  dextrose 50% Injectable 25 Gram(s) IV Push once  dextrose 50% Injectable 12.5 Gram(s) IV Push once  dextrose 50% Injectable 25 Gram(s) IV Push once  gabapentin 300 milliGRAM(s) Oral three times a day  glucagon  Injectable 1 milliGRAM(s) IntraMuscular once  heparin   Injectable 5000 Unit(s) SubCutaneous every 8 hours  hydrALAZINE 50 milliGRAM(s) Oral three times a day  insulin lispro (ADMELOG) corrective regimen sliding scale   SubCutaneous three times a day before meals  insulin lispro (ADMELOG) corrective regimen sliding scale   SubCutaneous at bedtime  insulin NPH human recombinant 25 Unit(s) SubCutaneous two times a day before meals  isosorbide   dinitrate Tablet (ISORDIL) 10 milliGRAM(s) Oral three times a day  metolazone 10 milliGRAM(s) Oral once  metoprolol succinate  milliGRAM(s) Oral two times a day  multivitamin 1 Tablet(s) Oral daily    VITALS:  T(F): 98.1 (21 @ 12:52), Max: 98.7 (21 @ 21:56)  HR: 67 (21 @ 14:26)  BP: 146/79 (21 @ 14:26)  RR: 18 (21 @ 12:52)  SpO2: 95% (21 @ 12:52)  Wt(kg): --     @ 07: @ 07:00  --------------------------------------------------------  IN: 0 mL / OUT: 1925 mL / NET: -1925 mL     @ 07:  -   @ 15:34  --------------------------------------------------------  IN: 240 mL / OUT: 0 mL / NET: 240 mL        Weight (kg): 103 ( @ 10:56)  PHYSICAL EXAM:  Constitutional: NAD  HEENT: anicteric sclera, oropharynx clear, MMM  Neck: No JVD  Respiratory: CTAB, no wheezes, rales or rhonchi  Cardiovascular: S1, S2, RRR  Gastrointestinal: BS+, soft, NT/ND  Extremities: No cyanosis or clubbing. No peripheral edema  Neurological: A/O x 3, no focal deficits  Psychiatric: Normal mood, normal affect  : No CVA tenderness. No paredes.   Skin: No rashes    LABS:      131<L>  |  91<L>  |  106<H>  ----------------------------<  303<H>  5.1   |  26  |  3.80<H>    Ca    10.4      07 May 2021 07:32  Phos  4.8       Mg     2.2           Creatinine Trend: 3.80 <--, 3.96 <--, 3.99 <--, 4.30 <--, 4.11 <--, 3.27 <--, 2.84 <--                        10.7   5.49  )-----------( 203      ( 07 May 2021 07:32 )             31.9     Urine Studies:  Urinalysis Basic - ( 02 May 2021 13:53 )    Color: Yellow / Appearance: Clear / S.012 / pH:   Gluc:  / Ketone: Negative  / Bili: Negative / Urobili: <2 mg/dL   Blood:  / Protein: Trace / Nitrite: Negative   Leuk Esterase: Negative / RBC: 1 /HPF / WBC 1 /HPF   Sq Epi:  / Non Sq Epi: 1 /HPF / Bacteria: Few      Creatinine, Random Urine: 140 mg/dL ( @ 13:53)  Sodium, Random Urine: 98 mmol/L ( @ 13:53)  Potassium, Random Urine: 30.2 mmol/L ( @ 13:53)  Chloride, Random Urine: 71 mmol/L ( @ 13:53)    RADIOLOGY & ADDITIONAL STUDIES:

## 2021-05-08 LAB
ANION GAP SERPL CALC-SCNC: 15 MMOL/L — HIGH (ref 7–14)
BUN SERPL-MCNC: 114 MG/DL — HIGH (ref 7–23)
CALCIUM SERPL-MCNC: 10.2 MG/DL — SIGNIFICANT CHANGE UP (ref 8.4–10.5)
CHLORIDE SERPL-SCNC: 90 MMOL/L — LOW (ref 98–107)
CO2 SERPL-SCNC: 27 MMOL/L — SIGNIFICANT CHANGE UP (ref 22–31)
CREAT SERPL-MCNC: 4.17 MG/DL — HIGH (ref 0.5–1.3)
GLUCOSE SERPL-MCNC: 308 MG/DL — HIGH (ref 70–99)
HCT VFR BLD CALC: 32.7 % — LOW (ref 34.5–45)
HGB BLD-MCNC: 10.8 G/DL — LOW (ref 11.5–15.5)
MAGNESIUM SERPL-MCNC: 2.5 MG/DL — SIGNIFICANT CHANGE UP (ref 1.6–2.6)
MCHC RBC-ENTMCNC: 28.6 PG — SIGNIFICANT CHANGE UP (ref 27–34)
MCHC RBC-ENTMCNC: 33 GM/DL — SIGNIFICANT CHANGE UP (ref 32–36)
MCV RBC AUTO: 86.5 FL — SIGNIFICANT CHANGE UP (ref 80–100)
NRBC # BLD: 0 /100 WBCS — SIGNIFICANT CHANGE UP
NRBC # FLD: 0 K/UL — SIGNIFICANT CHANGE UP
PHOSPHATE SERPL-MCNC: 6 MG/DL — HIGH (ref 2.5–4.5)
PLATELET # BLD AUTO: 197 K/UL — SIGNIFICANT CHANGE UP (ref 150–400)
POTASSIUM SERPL-MCNC: 4.7 MMOL/L — SIGNIFICANT CHANGE UP (ref 3.5–5.3)
POTASSIUM SERPL-SCNC: 4.7 MMOL/L — SIGNIFICANT CHANGE UP (ref 3.5–5.3)
RBC # BLD: 3.78 M/UL — LOW (ref 3.8–5.2)
RBC # FLD: 14 % — SIGNIFICANT CHANGE UP (ref 10.3–14.5)
SODIUM SERPL-SCNC: 132 MMOL/L — LOW (ref 135–145)
WBC # BLD: 4.85 K/UL — SIGNIFICANT CHANGE UP (ref 3.8–10.5)
WBC # FLD AUTO: 4.85 K/UL — SIGNIFICANT CHANGE UP (ref 3.8–10.5)

## 2021-05-08 PROCEDURE — 99233 SBSQ HOSP IP/OBS HIGH 50: CPT

## 2021-05-08 RX ORDER — HUMAN INSULIN 100 [IU]/ML
5 INJECTION, SUSPENSION SUBCUTANEOUS ONCE
Refills: 0 | Status: COMPLETED | OUTPATIENT
Start: 2021-05-08 | End: 2021-05-08

## 2021-05-08 RX ORDER — HUMAN INSULIN 100 [IU]/ML
30 INJECTION, SUSPENSION SUBCUTANEOUS
Refills: 0 | Status: DISCONTINUED | OUTPATIENT
Start: 2021-05-08 | End: 2021-05-09

## 2021-05-08 RX ADMIN — GABAPENTIN 300 MILLIGRAM(S): 400 CAPSULE ORAL at 21:08

## 2021-05-08 RX ADMIN — Medication 100 MILLIGRAM(S): at 13:00

## 2021-05-08 RX ADMIN — ISOSORBIDE DINITRATE 10 MILLIGRAM(S): 5 TABLET ORAL at 17:36

## 2021-05-08 RX ADMIN — Medication 5: at 13:00

## 2021-05-08 RX ADMIN — HEPARIN SODIUM 5000 UNIT(S): 5000 INJECTION INTRAVENOUS; SUBCUTANEOUS at 21:08

## 2021-05-08 RX ADMIN — ISOSORBIDE DINITRATE 10 MILLIGRAM(S): 5 TABLET ORAL at 13:00

## 2021-05-08 RX ADMIN — ATORVASTATIN CALCIUM 80 MILLIGRAM(S): 80 TABLET, FILM COATED ORAL at 21:08

## 2021-05-08 RX ADMIN — Medication 50 MILLIGRAM(S): at 05:37

## 2021-05-08 RX ADMIN — Medication 50 MILLIGRAM(S): at 13:01

## 2021-05-08 RX ADMIN — Medication 100 MILLIGRAM(S): at 05:37

## 2021-05-08 RX ADMIN — HEPARIN SODIUM 5000 UNIT(S): 5000 INJECTION INTRAVENOUS; SUBCUTANEOUS at 05:37

## 2021-05-08 RX ADMIN — Medication 50 MILLIGRAM(S): at 21:08

## 2021-05-08 RX ADMIN — Medication 1 TABLET(S): at 13:00

## 2021-05-08 RX ADMIN — Medication 81 MILLIGRAM(S): at 13:00

## 2021-05-08 RX ADMIN — Medication 100 MILLIGRAM(S): at 17:36

## 2021-05-08 RX ADMIN — Medication 2: at 22:01

## 2021-05-08 RX ADMIN — Medication 3: at 17:37

## 2021-05-08 RX ADMIN — HEPARIN SODIUM 5000 UNIT(S): 5000 INJECTION INTRAVENOUS; SUBCUTANEOUS at 13:01

## 2021-05-08 RX ADMIN — HUMAN INSULIN 30 UNIT(S): 100 INJECTION, SUSPENSION SUBCUTANEOUS at 17:37

## 2021-05-08 RX ADMIN — ISOSORBIDE DINITRATE 10 MILLIGRAM(S): 5 TABLET ORAL at 05:38

## 2021-05-08 RX ADMIN — HUMAN INSULIN 5 UNIT(S): 100 INJECTION, SUSPENSION SUBCUTANEOUS at 13:49

## 2021-05-08 RX ADMIN — GABAPENTIN 300 MILLIGRAM(S): 400 CAPSULE ORAL at 13:01

## 2021-05-08 RX ADMIN — BUMETANIDE 5 MG/HR: 0.25 INJECTION INTRAMUSCULAR; INTRAVENOUS at 17:37

## 2021-05-08 RX ADMIN — Medication 3: at 08:43

## 2021-05-08 RX ADMIN — HUMAN INSULIN 25 UNIT(S): 100 INJECTION, SUSPENSION SUBCUTANEOUS at 08:43

## 2021-05-08 RX ADMIN — GABAPENTIN 300 MILLIGRAM(S): 400 CAPSULE ORAL at 05:38

## 2021-05-08 NOTE — PROGRESS NOTE ADULT - PROBLEM SELECTOR PLAN 1
Pt with CKD3, follows with Dr Escudero in outpt clinic.   Baseline Cr ~2 from earlier this year.   Admission cr 2.4, peaked at 4.3,   No  obstruction on renal US.   No significant proteinuria on UA.  BP stable, no obvious nephrotoxin.   Nonologuric, and responsive to diuretics.   >3L urine output noted, over past 24 hours.   Bumex titration as per HF team.   currently renal function worsening, and BUN rising faster, possibly from overly brisk diuresis  if no improvement with lower diuretic dose, patient may need HD  no urgent indication for dialysis at this time  monitor daily BMP  dose all meds for eGFR<15ml/min.   avoid ACEi/ARB/NSAIDs/Nephrotoxics.

## 2021-05-08 NOTE — PROGRESS NOTE ADULT - PROBLEM SELECTOR PLAN 1
- Presenting with worsening SOB, orthopnea, b/l LE edema, weight gain, increasing abdominal girth, here with bibasilar crackles and JVP concerning for CHF exacerbation  - s/p RHC 5/4 c/w severe pulm HTN  - TTE normal LVEF  - c/w  bumex gtt as per CHF, s/p metolazone 5 x 2  - 1.5L fluid restriction, daily weights, I/O   - heart failure consult appreciated - Presenting with worsening SOB, orthopnea, b/l LE edema, weight gain, increasing abdominal girth, here with bibasilar crackles and JVP concerning for CHF exacerbation  - s/p RHC 5/4 c/w severe pulm HTN  - TTE normal LVEF  - Will consider changing bumex gtt to IV bid since Creat uptrending. Will speak to CHF team.  s/p metolazone 5 x 2  - 1.5L fluid restriction, daily weights, I/O   - heart failure consult appreciated

## 2021-05-08 NOTE — PROGRESS NOTE ADULT - ASSESSMENT
72 yo F with PMH of HTN, DM II, HLD, PAD s/p RLE angiogram, CKD p/w worsening SOB, LE edema, abdominal distension.   CXR shows clear lungs, LE dopplers show no DVT, CT head unremarkable. Labs significant for BUN/Cr 46/2.43, K 5.2, proBNP 303. She states she was recently told she had a weak heart and has been placed on oral diuretics, but with minimal relief. She admits to 4 pillow orthopnea, BREEN w/ minimal exertion. No PND, CP, palpitations, dizziness. She has had a stress test with her cardiologist Dr. Alas a few months ago, but unclear of the results. Admits to drinking 1 pint of vodka most weekends. Non smoker, no other drug use.    5/4/21 RHC: RA 20; PA systolic 69, PAD 21, PA mean 44, PCWP 25-30 with respiratory variation, Hemoglobin 9.9, PA sat 70's, CO 8.4/ CI 4- volume overloaded      #Acute on chronic diastolic heart failure.    Moderately hypervolemic.   Is/Os net neg 1.5L  - Continue Bumex 1 mg/hr. Goal net neg 1-2L, if UOP not adequate to meet goal by early afternoon, would redose metolazone 10mg x 1  - continue hydralazine 50 tid, isordil 10 tid  - continue Toprol 100 mg po BID.   - Keep k 4.0-5.0 and mag 2.0.   - Daily standing weights and strict I/O. 72 yo F with PMH of HTN, DM II, HLD, PAD s/p RLE angiogram, CKD p/w worsening SOB, LE edema, abdominal distension.   CXR shows clear lungs, LE dopplers show no DVT, CT head unremarkable. Labs significant for BUN/Cr 46/2.43, K 5.2, proBNP 303. She states she was recently told she had a weak heart and has been placed on oral diuretics, but with minimal relief. She admits to 4 pillow orthopnea, BREEN w/ minimal exertion. No PND, CP, palpitations, dizziness. She has had a stress test with her cardiologist Dr. Alas a few months ago, but unclear of the results. Admits to drinking 1 pint of vodka most weekends. Non smoker, no other drug use.    5/4/21 RHC: RA 20; PA systolic 69, PAD 21, PA mean 44, PCWP 25-30 with respiratory variation, Hemoglobin 9.9, PA sat 70's, CO 8.4/ CI 4- volume overloaded      #Acute on chronic diastolic heart failure.    Moderately hypervolemic.   Is/Os net neg 1.5L  - Continue Bumex 1 mg/hr. Goal net negative  - continue hydralazine 50 tid, isordil 10 tid  - continue Toprol 100 mg po BID.   - Keep k 4.0-5.0 and mag 2.0.   - Daily standing weights and strict I/O.

## 2021-05-08 NOTE — PROGRESS NOTE ADULT - ATTENDING COMMENTS
71 year old woman with diabetes, hypertension, chronic renal insufficiency, recent peripheral artery angiogram, alcohol abuse admitted for shortness of breath, edema, orthopnea. Started on bumetanide infusion with brisk urine output, stable Cr but worsening azotemia. She feels better today after diuresis. Appears comfortable, sitting upright. Chest is clear to ausculation.     Symptoms, fluid overload may be secondary to progression of renal insufficiency. Echo showed normal systolic function, only stage I diastolic dysfunction by Doppler parameters.    Continue IV diuresis as above today, for net negative fluid balance, but f/u renal recommendations.

## 2021-05-08 NOTE — PROGRESS NOTE ADULT - ASSESSMENT
71 y.o. Female w/ hx HTN, DM2 with neuropathy, Recently diagnosed CKD, PAD s/p angiogram RLE (pt denies any stents, just "clearing out"), HLD, and Thyroid nodules (benign biopsy x2 as per patient) p/w worsening SOB, LE edema, orthopnea, weight gain, and increasing abdominal girth likely 2/2 CHF exacerbation. Also with complaints of new intermittent double vision (occasionally when watching TV, does not occur otherwise) and new dysphagia intermittently.

## 2021-05-08 NOTE — PROGRESS NOTE ADULT - SUBJECTIVE AND OBJECTIVE BOX
Patient seen and examined at bedside.    Overnight Events:     Review Of Systems: No chest pain, shortness of breath, or palpitations            Current Meds:  acetaminophen   Tablet .. 650 milliGRAM(s) Oral every 6 hours PRN  allopurinol 100 milliGRAM(s) Oral daily  aspirin enteric coated 81 milliGRAM(s) Oral daily  atorvastatin 80 milliGRAM(s) Oral at bedtime  buMETAnide Infusion 1 mG/Hr IV Continuous <Continuous>  dextrose 40% Gel 15 Gram(s) Oral once  dextrose 5%. 1000 milliLiter(s) IV Continuous <Continuous>  dextrose 5%. 1000 milliLiter(s) IV Continuous <Continuous>  dextrose 50% Injectable 25 Gram(s) IV Push once  dextrose 50% Injectable 12.5 Gram(s) IV Push once  dextrose 50% Injectable 25 Gram(s) IV Push once  gabapentin 300 milliGRAM(s) Oral three times a day  glucagon  Injectable 1 milliGRAM(s) IntraMuscular once  heparin   Injectable 5000 Unit(s) SubCutaneous every 8 hours  hydrALAZINE 50 milliGRAM(s) Oral three times a day  insulin lispro (ADMELOG) corrective regimen sliding scale   SubCutaneous three times a day before meals  insulin lispro (ADMELOG) corrective regimen sliding scale   SubCutaneous at bedtime  insulin NPH human recombinant 25 Unit(s) SubCutaneous two times a day before meals  isosorbide   dinitrate Tablet (ISORDIL) 10 milliGRAM(s) Oral three times a day  ketotifen 0.025% Ophthalmic Solution 1 Drop(s) Both EYES two times a day PRN  metoprolol succinate  milliGRAM(s) Oral two times a day  multivitamin 1 Tablet(s) Oral daily      Vitals:  T(F): 97.8 (05-08), Max: 98.1 (05-07)  HR: 71 (05-08) (63 - 71)  BP: 134/70 (05-08) (128/71 - 146/79)  RR: 18 (05-08)  SpO2: 96% (05-08)  I&O's Summary    07 May 2021 07:01  -  08 May 2021 07:00  --------------------------------------------------------  IN: 1140 mL / OUT: 2650 mL / NET: -1510 mL        Physical Exam:  Appearance: No acute distress; well appearing  HEENT:  EOMI, sclera anicteric, Normal oral mucosa  Cardiovascular: RRR, S1, S2, no murmurs, rubs, or gallops; no edema; no JVD  Respiratory: Clear to auscultation bilaterally, no wheezes, rales, rhonchi  Gastrointestinal: soft, non-tender, non-distended with normal bowel sounds  Musculoskeletal: No clubbing; no joint deformity   Neurologic: Non-focal  Lymphatic: No lymphadenopathy  Psychiatry: AAOx3, mood & affect appropriate  Skin: No rashes, ecchymoses, or cyanosis                          10.8   4.85  )-----------( 197      ( 08 May 2021 07:52 )             32.7     05-07    131<L>  |  91<L>  |  106<H>  ----------------------------<  303<H>  5.1   |  26  |  3.80<H>    Ca    10.4      07 May 2021 07:32  Phos  4.8     05-07  Mg     2.2     05-07                    New ECG(s): Personally reviewed    Echo:    Stress Testing:     Cath:    Imaging:    Interpretation of Telemetry:   Patient seen and examined at bedside.    Overnight Events: NAEO, breathing has improved.      Current Meds:  acetaminophen   Tablet .. 650 milliGRAM(s) Oral every 6 hours PRN  allopurinol 100 milliGRAM(s) Oral daily  aspirin enteric coated 81 milliGRAM(s) Oral daily  atorvastatin 80 milliGRAM(s) Oral at bedtime  buMETAnide Infusion 1 mG/Hr IV Continuous <Continuous>  dextrose 40% Gel 15 Gram(s) Oral once  dextrose 5%. 1000 milliLiter(s) IV Continuous <Continuous>  dextrose 5%. 1000 milliLiter(s) IV Continuous <Continuous>  dextrose 50% Injectable 25 Gram(s) IV Push once  dextrose 50% Injectable 12.5 Gram(s) IV Push once  dextrose 50% Injectable 25 Gram(s) IV Push once  gabapentin 300 milliGRAM(s) Oral three times a day  glucagon  Injectable 1 milliGRAM(s) IntraMuscular once  heparin   Injectable 5000 Unit(s) SubCutaneous every 8 hours  hydrALAZINE 50 milliGRAM(s) Oral three times a day  insulin lispro (ADMELOG) corrective regimen sliding scale   SubCutaneous three times a day before meals  insulin lispro (ADMELOG) corrective regimen sliding scale   SubCutaneous at bedtime  insulin NPH human recombinant 25 Unit(s) SubCutaneous two times a day before meals  isosorbide   dinitrate Tablet (ISORDIL) 10 milliGRAM(s) Oral three times a day  ketotifen 0.025% Ophthalmic Solution 1 Drop(s) Both EYES two times a day PRN  metoprolol succinate  milliGRAM(s) Oral two times a day  multivitamin 1 Tablet(s) Oral daily      Vitals:  T(F): 97.8 (05-08), Max: 98.1 (05-07)  HR: 71 (05-08) (63 - 71)  BP: 134/70 (05-08) (128/71 - 146/79)  RR: 18 (05-08)  SpO2: 96% (05-08)  I&O's Summary    07 May 2021 07:01  -  08 May 2021 07:00  --------------------------------------------------------  IN: 1140 mL / OUT: 2650 mL / NET: -1510 mL        Physical Exam:  Appearance: No acute distress; well appearing  HEENT:  EOMI, sclera anicteric, Normal oral mucosa  Cardiovascular: RRR, S1, S2, no murmurs, rubs, or gallops; trace edema  Respiratory: Clear to auscultation bilaterally, no wheezes, rales, rhonchi  Gastrointestinal: soft, non-tender, non-distended with normal bowel sounds  Musculoskeletal: No clubbing; no joint deformity   Neurologic: Non-focal  Lymphatic: No lymphadenopathy  Psychiatry: AAOx3, mood & affect appropriate  Skin: No rashes, ecchymoses, or cyanosis                          10.8   4.85  )-----------( 197      ( 08 May 2021 07:52 )             32.7     05-07    131<L>  |  91<L>  |  106<H>  ----------------------------<  303<H>  5.1   |  26  |  3.80<H>    Ca    10.4      07 May 2021 07:32  Phos  4.8     05-07  Mg     2.2     05-07      Interpretation of Telemetry: sinus 60s

## 2021-05-08 NOTE — PROGRESS NOTE ADULT - PROBLEM SELECTOR PLAN 6
- On NPH 25U BID and novolin R sliding scale (usually end up taking 5U BID with her NPH)  - cont gabapentin for her neuropathy  - c/w atorvastatin, - FS elevated in 300s  -increase NPH to 30 Unit BID and novolin R sliding scale (usually end up taking 5U BID with her NPH)  - cont gabapentin for her neuropathy  - c/w atorvastatin,

## 2021-05-08 NOTE — PROGRESS NOTE ADULT - SUBJECTIVE AND OBJECTIVE BOX
[facility] - FRIDA Murdock / FRIDA Thompson / DALJIT Kendrick/ FRIDA Magallanes/ FRIDA Morocho/ ИВАН Escudero / HIMANSHU Raymundo / MENDOZA Alfaro  ---------------------------------------------------------------------------------------------------------------  seen and examined today for RADHA on CKD  Interval : serum creatinine and BUN worsening  VITALS:  T(F): 97.8 (05-08-21 @ 05:35), Max: 98.1 (05-07-21 @ 12:52)  HR: 71 (05-08-21 @ 05:35)  BP: 134/70 (05-08-21 @ 05:35)  RR: 18 (05-08-21 @ 05:35)  SpO2: 96% (05-08-21 @ 05:35)  Wt(kg): --    05-07 @ 07:01  -  05-08 @ 07:00  --------------------------------------------------------  IN: 1140 mL / OUT: 2650 mL / NET: -1510 mL      Physical Exam :-  Constitutional: NAD  Neck: Supple.  Respiratory: Bilateral equal breath sounds,  Cardiovascular: S1, S2 normal,  Gastrointestinal: Bowel Sounds present, soft, non tender.  Extremities: 2+ edema  Neurological: Alert and Oriented x 3, no focal deficits  Psychiatric: Normal mood, normal affect  Data:-  Allergies :   No Known Allergies    Hospital Medications:   MEDICATIONS  (STANDING):  allopurinol 100 milliGRAM(s) Oral daily  aspirin enteric coated 81 milliGRAM(s) Oral daily  atorvastatin 80 milliGRAM(s) Oral at bedtime  buMETAnide Infusion 1 mG/Hr (5 mL/Hr) IV Continuous <Continuous>  dextrose 40% Gel 15 Gram(s) Oral once  dextrose 5%. 1000 milliLiter(s) (50 mL/Hr) IV Continuous <Continuous>  dextrose 5%. 1000 milliLiter(s) (100 mL/Hr) IV Continuous <Continuous>  dextrose 50% Injectable 25 Gram(s) IV Push once  dextrose 50% Injectable 12.5 Gram(s) IV Push once  dextrose 50% Injectable 25 Gram(s) IV Push once  gabapentin 300 milliGRAM(s) Oral three times a day  glucagon  Injectable 1 milliGRAM(s) IntraMuscular once  heparin   Injectable 5000 Unit(s) SubCutaneous every 8 hours  hydrALAZINE 50 milliGRAM(s) Oral three times a day  insulin lispro (ADMELOG) corrective regimen sliding scale   SubCutaneous three times a day before meals  insulin lispro (ADMELOG) corrective regimen sliding scale   SubCutaneous at bedtime  insulin NPH human recombinant 25 Unit(s) SubCutaneous two times a day before meals  isosorbide   dinitrate Tablet (ISORDIL) 10 milliGRAM(s) Oral three times a day  metoprolol succinate  milliGRAM(s) Oral two times a day  multivitamin 1 Tablet(s) Oral daily    05-08    132<L>  |  90<L>  |  114<H>  ----------------------------<  308<H>  4.7   |  27  |  4.17<H>    Ca    10.2      08 May 2021 07:52  Phos  6.0     05-08  Mg     2.5     05-08      Creatinine Trend: 4.17 <--, 3.80 <--, 3.96 <--, 3.99 <--, 4.30 <--, 4.11 <--, 3.27 <--                        10.8   4.85  )-----------( 197      ( 08 May 2021 07:52 )             32.7

## 2021-05-08 NOTE — CHART NOTE - NSCHARTNOTEFT_GEN_A_CORE
Source: Patient [X]         other [X] Medical chart, nurse     Diet rx: Regular: Consistent Carbohydrate {No Snacks} (CSTCHO)  DASH/TLC {Sodium & Cholesterol Restricted} (DASH); No Dairy (05-01-21 @ 16:34) [Active]    Pt 72 yo female with PMHx of HTN, HLD, DM2 w/ neuropathy, Recently diagnosed CKD, PAD s/p angiogram RLE, thyroid nodules (benign biopsy x2) - per chart review. Of note Pt with CHF.    At time of visit Pt appears alert, oriented. Per Pt her appetite good; no chewing or swallowing difficulty; no nausea, vomiting or diarrhea @ this time. +BM (5/7) - per flow sheet. Of note Pt passed Swallow Bedside Assessment Adult, SLP rec: Regular solids with thin liquids (5/1). Food preferences discussed with Pt. RDN answered concerns related to diet. RDN remains available, Pt made aware.       Pt's height: 62" (4/29/21)        IBW: 110#+/-10%       Pt's weight: 102.5 Kg (5/8/21), 106.1 Kg (4/29/21)   Pertinent Medications: Aspirin, Heparin, Insulin (NPH), Insulin (Admalog), Miralax, Lipitor, Multivitamin,   Pertinent Labs:  05-08 Na132 mmol/L<L> Glu 308 mg/dL<H> K+ 4.7 mmol/L Cr  4.17 mg/dL<H>  mg/dL<H> 05-08 Phos 6.0 mg/dL<H> 04-30 Chol 132 mg/dL LDL --    HDL 37 mg/dL<L> Trig 117 mg/dL  Skin: per flow sheet Pt with 1+edema: generalized; 2+edema: r/l leg, r/l ankle, r/l ankle;   +dryness/ecchymosis     Estimated Needs: [X] no change since previous assessment  Previous Nutrition Diagnosis: [X] Food & Nutrition Related Knowledge Deficit.    Nutrition Interventions/Recommendations:   1. Continue PO diet as ordered; Monitor PO diet tolerance; Honor food preferences;  2. Monitor labs, daily weights, hydration status;   3. Will recommend to follow up with appropriate RDN upon discharge for the purposes of long-term nutrition evaluation and diet education;

## 2021-05-08 NOTE — PROGRESS NOTE ADULT - SUBJECTIVE AND OBJECTIVE BOX
Patient is a 71y old  Female who presents with a chief complaint of Worsening SOB (08 May 2021 10:11)      SUBJECTIVE / OVERNIGHT EVENTS:    MEDICATIONS  (STANDING):  allopurinol 100 milliGRAM(s) Oral daily  aspirin enteric coated 81 milliGRAM(s) Oral daily  atorvastatin 80 milliGRAM(s) Oral at bedtime  buMETAnide Infusion 1 mG/Hr (5 mL/Hr) IV Continuous <Continuous>  dextrose 40% Gel 15 Gram(s) Oral once  dextrose 5%. 1000 milliLiter(s) (50 mL/Hr) IV Continuous <Continuous>  dextrose 5%. 1000 milliLiter(s) (100 mL/Hr) IV Continuous <Continuous>  dextrose 50% Injectable 25 Gram(s) IV Push once  dextrose 50% Injectable 12.5 Gram(s) IV Push once  dextrose 50% Injectable 25 Gram(s) IV Push once  gabapentin 300 milliGRAM(s) Oral three times a day  glucagon  Injectable 1 milliGRAM(s) IntraMuscular once  heparin   Injectable 5000 Unit(s) SubCutaneous every 8 hours  hydrALAZINE 50 milliGRAM(s) Oral three times a day  insulin lispro (ADMELOG) corrective regimen sliding scale   SubCutaneous three times a day before meals  insulin lispro (ADMELOG) corrective regimen sliding scale   SubCutaneous at bedtime  insulin NPH human recombinant 25 Unit(s) SubCutaneous two times a day before meals  isosorbide   dinitrate Tablet (ISORDIL) 10 milliGRAM(s) Oral three times a day  metoprolol succinate  milliGRAM(s) Oral two times a day  multivitamin 1 Tablet(s) Oral daily    MEDICATIONS  (PRN):  acetaminophen   Tablet .. 650 milliGRAM(s) Oral every 6 hours PRN Temp greater or equal to 38C (100.4F), Mild Pain (1 - 3), Moderate Pain (4 - 6)  ketotifen 0.025% Ophthalmic Solution 1 Drop(s) Both EYES two times a day PRN Allergic Conjunctivitis      Vital Signs Last 24 Hrs  T(C): 36.6 (08 May 2021 05:35), Max: 36.7 (07 May 2021 12:52)  T(F): 97.8 (08 May 2021 05:35), Max: 98.1 (07 May 2021 12:52)  HR: 71 (08 May 2021 05:35) (63 - 71)  BP: 134/70 (08 May 2021 05:35) (128/71 - 146/79)  BP(mean): --  RR: 18 (08 May 2021 05:35) (18 - 18)  SpO2: 96% (08 May 2021 05:35) (95% - 97%)  CAPILLARY BLOOD GLUCOSE      POCT Blood Glucose.: 300 mg/dL (08 May 2021 08:36)  POCT Blood Glucose.: 392 mg/dL (07 May 2021 22:17)  POCT Blood Glucose.: 337 mg/dL (07 May 2021 17:53)  POCT Blood Glucose.: 396 mg/dL (07 May 2021 12:16)    I&O's Summary    07 May 2021 07:01  -  08 May 2021 07:00  --------------------------------------------------------  IN: 1140 mL / OUT: 2650 mL / NET: -1510 mL        PHYSICAL EXAM:  GENERAL: NAD, well-developed  HEAD:  Atraumatic, Normocephalic  EYES: EOMI, PERRLA, conjunctiva and sclera clear  NECK: Supple, No JVD  CHEST/LUNG: Clear to auscultation bilaterally; No wheeze  HEART: Regular rate and rhythm; No murmurs, rubs, or gallops  ABDOMEN: Soft, Nontender, Nondistended; Bowel sounds present  EXTREMITIES:  2+ Peripheral Pulses, No clubbing, cyanosis, or edema  PSYCH: AAOx3  NEUROLOGY: non-focal  SKIN: No rashes or lesions    LABS:                        10.8   4.85  )-----------( 197      ( 08 May 2021 07:52 )             32.7     05-08    132<L>  |  90<L>  |  114<H>  ----------------------------<  308<H>  4.7   |  27  |  4.17<H>    Ca    10.2      08 May 2021 07:52  Phos  6.0     05-08  Mg     2.5     05-08                RADIOLOGY & ADDITIONAL TESTS:    Imaging Personally Reviewed:    Consultant(s) Notes Reviewed:      Care Discussed with Consultants/Other Providers:   Patient is a 71y old  Female who presents with a chief complaint of Worsening SOB (08 May 2021 10:11)      SUBJECTIVE / OVERNIGHT EVENTS:  Patient has no new complaints. Denies cp, SOB, abdominal pain, N/V/D     MEDICATIONS  (STANDING):  allopurinol 100 milliGRAM(s) Oral daily  aspirin enteric coated 81 milliGRAM(s) Oral daily  atorvastatin 80 milliGRAM(s) Oral at bedtime  buMETAnide Infusion 1 mG/Hr (5 mL/Hr) IV Continuous <Continuous>  dextrose 40% Gel 15 Gram(s) Oral once  dextrose 5%. 1000 milliLiter(s) (50 mL/Hr) IV Continuous <Continuous>  dextrose 5%. 1000 milliLiter(s) (100 mL/Hr) IV Continuous <Continuous>  dextrose 50% Injectable 25 Gram(s) IV Push once  dextrose 50% Injectable 12.5 Gram(s) IV Push once  dextrose 50% Injectable 25 Gram(s) IV Push once  gabapentin 300 milliGRAM(s) Oral three times a day  glucagon  Injectable 1 milliGRAM(s) IntraMuscular once  heparin   Injectable 5000 Unit(s) SubCutaneous every 8 hours  hydrALAZINE 50 milliGRAM(s) Oral three times a day  insulin lispro (ADMELOG) corrective regimen sliding scale   SubCutaneous three times a day before meals  insulin lispro (ADMELOG) corrective regimen sliding scale   SubCutaneous at bedtime  insulin NPH human recombinant 25 Unit(s) SubCutaneous two times a day before meals  isosorbide   dinitrate Tablet (ISORDIL) 10 milliGRAM(s) Oral three times a day  metoprolol succinate  milliGRAM(s) Oral two times a day  multivitamin 1 Tablet(s) Oral daily    MEDICATIONS  (PRN):  acetaminophen   Tablet .. 650 milliGRAM(s) Oral every 6 hours PRN Temp greater or equal to 38C (100.4F), Mild Pain (1 - 3), Moderate Pain (4 - 6)  ketotifen 0.025% Ophthalmic Solution 1 Drop(s) Both EYES two times a day PRN Allergic Conjunctivitis      Vital Signs Last 24 Hrs  T(C): 36.6 (08 May 2021 05:35), Max: 36.7 (07 May 2021 12:52)  T(F): 97.8 (08 May 2021 05:35), Max: 98.1 (07 May 2021 12:52)  HR: 71 (08 May 2021 05:35) (63 - 71)  BP: 134/70 (08 May 2021 05:35) (128/71 - 146/79)  BP(mean): --  RR: 18 (08 May 2021 05:35) (18 - 18)  SpO2: 96% (08 May 2021 05:35) (95% - 97%)  CAPILLARY BLOOD GLUCOSE      POCT Blood Glucose.: 300 mg/dL (08 May 2021 08:36)  POCT Blood Glucose.: 392 mg/dL (07 May 2021 22:17)  POCT Blood Glucose.: 337 mg/dL (07 May 2021 17:53)  POCT Blood Glucose.: 396 mg/dL (07 May 2021 12:16)    I&O's Summary    07 May 2021 07:01  -  08 May 2021 07:00  --------------------------------------------------------  IN: 1140 mL / OUT: 2650 mL / NET: -1510 mL        PHYSICAL EXAM:  GENERAL: NAD, well-developed  HEAD:  Atraumatic, Normocephalic  EYES: EOMI, PERRLA, conjunctiva and sclera clear  NECK: Supple, No JVD  CHEST/LUNG: decreased BS bilaterally; No wheeze  HEART: Regular rate and rhythm; No murmurs, rubs, or gallops  ABDOMEN: Soft, Nontender, Nondistended; Bowel sounds present  EXTREMITIES:  1+ B/L LE edema. 2+ Peripheral Pulses, No clubbing, or cyanosis  PSYCH: AAOx3  NEUROLOGY: non-focal  SKIN: No rashes or lesions    LABS:                        10.8   4.85  )-----------( 197      ( 08 May 2021 07:52 )             32.7     05-08    132<L>  |  90<L>  |  114<H>  ----------------------------<  308<H>  4.7   |  27  |  4.17<H>    Ca    10.2      08 May 2021 07:52  Phos  6.0     05-08  Mg     2.5     05-08                RADIOLOGY & ADDITIONAL TESTS:    Imaging Personally Reviewed:    Consultant(s) Notes Reviewed:      Care Discussed with Consultants/Other Providers:

## 2021-05-09 LAB
ANION GAP SERPL CALC-SCNC: 14 MMOL/L — SIGNIFICANT CHANGE UP (ref 7–14)
BUN SERPL-MCNC: 129 MG/DL — HIGH (ref 7–23)
CALCIUM SERPL-MCNC: 10.4 MG/DL — SIGNIFICANT CHANGE UP (ref 8.4–10.5)
CHLORIDE SERPL-SCNC: 88 MMOL/L — LOW (ref 98–107)
CO2 SERPL-SCNC: 30 MMOL/L — SIGNIFICANT CHANGE UP (ref 22–31)
CREAT SERPL-MCNC: 4.8 MG/DL — HIGH (ref 0.5–1.3)
GLUCOSE SERPL-MCNC: 276 MG/DL — HIGH (ref 70–99)
HCT VFR BLD CALC: 32.7 % — LOW (ref 34.5–45)
HGB BLD-MCNC: 10.8 G/DL — LOW (ref 11.5–15.5)
MAGNESIUM SERPL-MCNC: 2.5 MG/DL — SIGNIFICANT CHANGE UP (ref 1.6–2.6)
MCHC RBC-ENTMCNC: 28.1 PG — SIGNIFICANT CHANGE UP (ref 27–34)
MCHC RBC-ENTMCNC: 33 GM/DL — SIGNIFICANT CHANGE UP (ref 32–36)
MCV RBC AUTO: 85.2 FL — SIGNIFICANT CHANGE UP (ref 80–100)
NRBC # BLD: 0 /100 WBCS — SIGNIFICANT CHANGE UP
NRBC # FLD: 0 K/UL — SIGNIFICANT CHANGE UP
PHOSPHATE SERPL-MCNC: 5.4 MG/DL — HIGH (ref 2.5–4.5)
PLATELET # BLD AUTO: 206 K/UL — SIGNIFICANT CHANGE UP (ref 150–400)
POTASSIUM SERPL-MCNC: 4.7 MMOL/L — SIGNIFICANT CHANGE UP (ref 3.5–5.3)
POTASSIUM SERPL-SCNC: 4.7 MMOL/L — SIGNIFICANT CHANGE UP (ref 3.5–5.3)
RBC # BLD: 3.84 M/UL — SIGNIFICANT CHANGE UP (ref 3.8–5.2)
RBC # FLD: 14.1 % — SIGNIFICANT CHANGE UP (ref 10.3–14.5)
SODIUM SERPL-SCNC: 132 MMOL/L — LOW (ref 135–145)
WBC # BLD: 4.88 K/UL — SIGNIFICANT CHANGE UP (ref 3.8–10.5)
WBC # FLD AUTO: 4.88 K/UL — SIGNIFICANT CHANGE UP (ref 3.8–10.5)

## 2021-05-09 PROCEDURE — 99232 SBSQ HOSP IP/OBS MODERATE 35: CPT

## 2021-05-09 PROCEDURE — 99233 SBSQ HOSP IP/OBS HIGH 50: CPT

## 2021-05-09 RX ORDER — HUMAN INSULIN 100 [IU]/ML
35 INJECTION, SUSPENSION SUBCUTANEOUS
Refills: 0 | Status: DISCONTINUED | OUTPATIENT
Start: 2021-05-09 | End: 2021-05-10

## 2021-05-09 RX ORDER — BUMETANIDE 0.25 MG/ML
1 INJECTION INTRAMUSCULAR; INTRAVENOUS
Refills: 0 | Status: DISCONTINUED | OUTPATIENT
Start: 2021-05-09 | End: 2021-05-11

## 2021-05-09 RX ADMIN — Medication 100 MILLIGRAM(S): at 05:28

## 2021-05-09 RX ADMIN — Medication 650 MILLIGRAM(S): at 15:00

## 2021-05-09 RX ADMIN — Medication 5: at 12:48

## 2021-05-09 RX ADMIN — GABAPENTIN 300 MILLIGRAM(S): 400 CAPSULE ORAL at 12:48

## 2021-05-09 RX ADMIN — Medication 50 MILLIGRAM(S): at 05:27

## 2021-05-09 RX ADMIN — GABAPENTIN 300 MILLIGRAM(S): 400 CAPSULE ORAL at 05:27

## 2021-05-09 RX ADMIN — HEPARIN SODIUM 5000 UNIT(S): 5000 INJECTION INTRAVENOUS; SUBCUTANEOUS at 05:28

## 2021-05-09 RX ADMIN — ISOSORBIDE DINITRATE 10 MILLIGRAM(S): 5 TABLET ORAL at 17:43

## 2021-05-09 RX ADMIN — GABAPENTIN 300 MILLIGRAM(S): 400 CAPSULE ORAL at 20:50

## 2021-05-09 RX ADMIN — HUMAN INSULIN 30 UNIT(S): 100 INJECTION, SUSPENSION SUBCUTANEOUS at 08:40

## 2021-05-09 RX ADMIN — Medication 650 MILLIGRAM(S): at 14:01

## 2021-05-09 RX ADMIN — Medication 100 MILLIGRAM(S): at 17:43

## 2021-05-09 RX ADMIN — ISOSORBIDE DINITRATE 10 MILLIGRAM(S): 5 TABLET ORAL at 05:28

## 2021-05-09 RX ADMIN — HEPARIN SODIUM 5000 UNIT(S): 5000 INJECTION INTRAVENOUS; SUBCUTANEOUS at 20:50

## 2021-05-09 RX ADMIN — HEPARIN SODIUM 5000 UNIT(S): 5000 INJECTION INTRAVENOUS; SUBCUTANEOUS at 12:48

## 2021-05-09 RX ADMIN — BUMETANIDE 1 MILLIGRAM(S): 0.25 INJECTION INTRAMUSCULAR; INTRAVENOUS at 17:43

## 2021-05-09 RX ADMIN — ATORVASTATIN CALCIUM 80 MILLIGRAM(S): 80 TABLET, FILM COATED ORAL at 20:50

## 2021-05-09 RX ADMIN — Medication 1 TABLET(S): at 12:47

## 2021-05-09 RX ADMIN — Medication 3: at 17:44

## 2021-05-09 RX ADMIN — Medication 50 MILLIGRAM(S): at 20:50

## 2021-05-09 RX ADMIN — Medication 2: at 22:36

## 2021-05-09 RX ADMIN — Medication 3: at 08:40

## 2021-05-09 RX ADMIN — Medication 81 MILLIGRAM(S): at 12:48

## 2021-05-09 RX ADMIN — Medication 100 MILLIGRAM(S): at 12:47

## 2021-05-09 RX ADMIN — HUMAN INSULIN 35 UNIT(S): 100 INJECTION, SUSPENSION SUBCUTANEOUS at 17:43

## 2021-05-09 RX ADMIN — ISOSORBIDE DINITRATE 10 MILLIGRAM(S): 5 TABLET ORAL at 12:47

## 2021-05-09 RX ADMIN — Medication 50 MILLIGRAM(S): at 12:48

## 2021-05-09 NOTE — PROGRESS NOTE ADULT - PROBLEM SELECTOR PLAN 6
- FS improved but still elevated.   -increase NPH to 35 Unit BID and novolin R sliding scale (usually end up taking 5U BID with her NPH)  - cont gabapentin for her neuropathy  - c/w atorvastatin,

## 2021-05-09 NOTE — PROGRESS NOTE ADULT - ASSESSMENT
72 yo F with PMH of HTN, DM II, HLD, PAD s/p RLE angiogram, CKD p/w worsening SOB, LE edema, abdominal distension.   CXR shows clear lungs, LE dopplers show no DVT, CT head unremarkable. Labs significant for BUN/Cr 46/2.43, K 5.2, proBNP 303. She states she was recently told she had a weak heart and has been placed on oral diuretics, but with minimal relief. She admits to 4 pillow orthopnea, BREEN w/ minimal exertion. No PND, CP, palpitations, dizziness. She has had a stress test with her cardiologist Dr. Alas a few months ago, but unclear of the results. Admits to drinking 1 pint of vodka most weekends. Non smoker, no other drug use.    5/4/21 RHC: RA 20; PA systolic 69, PAD 21, PA mean 44, PCWP 25-30 with respiratory variation, Hemoglobin 9.9, PA sat 70's, CO 8.4/ CI 4- volume overloaded      #Volume overload/CKD  Is/Os net neg 1.4L.  Cr up to 4.8  - switch bumex gtt to bumex 1mg PO BID, goal net even  - f/u nephrology reccs  - continue hydralazine 50 tid, isordil 10 tid  - continue Toprol 100 mg po BID.   - Keep k 4.0-5.0 and mag 2.0.   - Daily standing weights and strict I/O.    Deric Pierre MD  Cardiology Fellow  [phone]  All Cardiology service information can be found 24/7 on [email or web address], password: [employer]

## 2021-05-09 NOTE — PROGRESS NOTE ADULT - PROBLEM SELECTOR PLAN 2
- Cr increasing likely due to diuresis,  unknown baseline  -should improve with decreased dose of bumex.  - renal US -> no hydronephrosis   - diuretics as above  - nephrology following- f/u recs  - avoid nephrotoxins   - monitor BMP

## 2021-05-09 NOTE — PROGRESS NOTE ADULT - PROBLEM SELECTOR PLAN 1
- Presenting with worsening SOB, orthopnea, b/l LE edema, weight gain, increasing abdominal girth, here with bibasilar crackles and JVP concerning for CHF exacerbation  - s/p RHC 5/4 c/w severe pulm HTN  - TTE normal LVEF  - changed  bumex gtt to 1mg PO qdaily since Creat uptrending. s/p metolazone 5 x 2  - 1.5L fluid restriction, daily weights, I/O   - F/H heart failure team recs.

## 2021-05-09 NOTE — PROGRESS NOTE ADULT - SUBJECTIVE AND OBJECTIVE BOX
[facility] - FRIDA Murdock / FRIDA Thompson / DALJIT Kendrick/ FRIDA Magallanes/ FRIDA Morocho/ ИВАН Escudero / HIMANSHU Raymundo / MENDOZA Alfaro  ---------------------------------------------------------------------------------------------------------------  seen and examined today for RADHA on CKD  Interval : worsening renal function  VITALS:  T(F): 98.3 (05-09-21 @ 05:22), Max: 98.3 (05-09-21 @ 05:22)  HR: 74 (05-09-21 @ 05:22)  BP: 115/57 (05-09-21 @ 05:22)  RR: 18 (05-09-21 @ 05:22)  SpO2: 97% (05-09-21 @ 05:22)  Wt(kg): --    05-08 @ 07:01  -  05-09 @ 07:00  --------------------------------------------------------  IN: 335 mL / OUT: 1750 mL / NET: -1415 mL      Physical Exam :-  Constitutional: NAD  Neck: Supple.  Respiratory: Bilateral equal breath sounds,  Cardiovascular: S1, S2 normal,  Gastrointestinal: Bowel Sounds present, soft, non tender.  Extremities: 1+ edema  Neurological: Alert and Oriented x 3, no focal deficits  Psychiatric: Normal mood, normal affect  Data:-  Allergies :   No Known Allergies    Hospital Medications:   MEDICATIONS  (STANDING):  allopurinol 100 milliGRAM(s) Oral daily  aspirin enteric coated 81 milliGRAM(s) Oral daily  atorvastatin 80 milliGRAM(s) Oral at bedtime  buMETAnide 1 milliGRAM(s) Oral two times a day  dextrose 40% Gel 15 Gram(s) Oral once  dextrose 5%. 1000 milliLiter(s) (50 mL/Hr) IV Continuous <Continuous>  dextrose 5%. 1000 milliLiter(s) (100 mL/Hr) IV Continuous <Continuous>  dextrose 50% Injectable 25 Gram(s) IV Push once  dextrose 50% Injectable 12.5 Gram(s) IV Push once  dextrose 50% Injectable 25 Gram(s) IV Push once  gabapentin 300 milliGRAM(s) Oral three times a day  glucagon  Injectable 1 milliGRAM(s) IntraMuscular once  heparin   Injectable 5000 Unit(s) SubCutaneous every 8 hours  hydrALAZINE 50 milliGRAM(s) Oral three times a day  insulin lispro (ADMELOG) corrective regimen sliding scale   SubCutaneous three times a day before meals  insulin lispro (ADMELOG) corrective regimen sliding scale   SubCutaneous at bedtime  insulin NPH human recombinant 30 Unit(s) SubCutaneous two times a day before meals  isosorbide   dinitrate Tablet (ISORDIL) 10 milliGRAM(s) Oral three times a day  metoprolol succinate  milliGRAM(s) Oral two times a day  multivitamin 1 Tablet(s) Oral daily    05-09    132<L>  |  88<L>  |  129<H>  ----------------------------<  276<H>  4.7   |  30  |  4.80<H>    Ca    10.4      09 May 2021 07:19  Phos  5.4     05-09  Mg     2.5     05-09      Creatinine Trend: 4.80 <--, 4.17 <--, 3.80 <--, 3.96 <--, 3.99 <--, 4.30 <--, 4.11 <--                        10.8   4.88  )-----------( 206      ( 09 May 2021 07:19 )             32.7

## 2021-05-09 NOTE — PROGRESS NOTE ADULT - PROBLEM SELECTOR PLAN 1
Pt with CKD3, follows with Dr Escudero in outpt clinic.   Baseline Cr ~2 from earlier this year.   Admission cr 2.4, -> 4.8  No  obstruction on renal US.   No significant proteinuria on UA.  BP stable, no obvious nephrotoxin.   Nonologuric, and responsive to diuretics.   currently renal function worsening, and BUN rising faster, possibly from overly brisk diuresis  HF has decreased bumens to 1mg PO QD  if no improvement with lower diuretic dose, patient may need HD soon  no urgent indication for dialysis at this time  monitor daily BMP  dose all meds for eGFR<15ml/min.   avoid ACEi/ARB/NSAIDs/Nephrotoxics.

## 2021-05-09 NOTE — PROGRESS NOTE ADULT - SUBJECTIVE AND OBJECTIVE BOX
Patient seen and examined at bedside.    Overnight Events: NAEO, breathing has improved.    Current Meds:  acetaminophen   Tablet .. 650 milliGRAM(s) Oral every 6 hours PRN  allopurinol 100 milliGRAM(s) Oral daily  aspirin enteric coated 81 milliGRAM(s) Oral daily  atorvastatin 80 milliGRAM(s) Oral at bedtime  buMETAnide Infusion 1 mG/Hr IV Continuous <Continuous>  dextrose 40% Gel 15 Gram(s) Oral once  dextrose 5%. 1000 milliLiter(s) IV Continuous <Continuous>  dextrose 5%. 1000 milliLiter(s) IV Continuous <Continuous>  dextrose 50% Injectable 25 Gram(s) IV Push once  dextrose 50% Injectable 12.5 Gram(s) IV Push once  dextrose 50% Injectable 25 Gram(s) IV Push once  gabapentin 300 milliGRAM(s) Oral three times a day  glucagon  Injectable 1 milliGRAM(s) IntraMuscular once  heparin   Injectable 5000 Unit(s) SubCutaneous every 8 hours  hydrALAZINE 50 milliGRAM(s) Oral three times a day  insulin lispro (ADMELOG) corrective regimen sliding scale   SubCutaneous three times a day before meals  insulin lispro (ADMELOG) corrective regimen sliding scale   SubCutaneous at bedtime  insulin NPH human recombinant 25 Unit(s) SubCutaneous two times a day before meals  isosorbide   dinitrate Tablet (ISORDIL) 10 milliGRAM(s) Oral three times a day  ketotifen 0.025% Ophthalmic Solution 1 Drop(s) Both EYES two times a day PRN  metoprolol succinate  milliGRAM(s) Oral two times a day  multivitamin 1 Tablet(s) Oral daily      Vitals:  T(F): 97.8 (05-08), Max: 98.1 (05-07)  HR: 71 (05-08) (63 - 71)  BP: 134/70 (05-08) (128/71 - 146/79)  RR: 18 (05-08)  SpO2: 96% (05-08)  I&O's Summary    07 May 2021 07:01  -  08 May 2021 07:00  --------------------------------------------------------  IN: 1140 mL / OUT: 2650 mL / NET: -1510 mL        Physical Exam:  Appearance: No acute distress; well appearing  HEENT:  EOMI, sclera anicteric, Normal oral mucosa  Cardiovascular: RRR, S1, S2, no murmurs, rubs, or gallops; trace edema  Respiratory: Clear to auscultation bilaterally, no wheezes, rales, rhonchi  Gastrointestinal: soft, non-tender, non-distended with normal bowel sounds  Musculoskeletal: No clubbing; no joint deformity   Neurologic: Non-focal  Lymphatic: No lymphadenopathy  Psychiatry: AAOx3, mood & affect appropriate  Skin: No rashes, ecchymoses, or cyanosis                          10.8   4.85  )-----------( 197      ( 08 May 2021 07:52 )             32.7     05-07    131<L>  |  91<L>  |  106<H>  ----------------------------<  303<H>  5.1   |  26  |  3.80<H>    Ca    10.4      07 May 2021 07:32  Phos  4.8     05-07  Mg     2.2     05-07      Interpretation of Telemetry: sinus 60s

## 2021-05-09 NOTE — PROGRESS NOTE ADULT - SUBJECTIVE AND OBJECTIVE BOX
Patient is a 71y old  Female who presents with a chief complaint of Worsening SOB (09 May 2021 09:53)      SUBJECTIVE / OVERNIGHT EVENTS:    MEDICATIONS  (STANDING):  allopurinol 100 milliGRAM(s) Oral daily  aspirin enteric coated 81 milliGRAM(s) Oral daily  atorvastatin 80 milliGRAM(s) Oral at bedtime  buMETAnide 1 milliGRAM(s) Oral two times a day  dextrose 40% Gel 15 Gram(s) Oral once  dextrose 5%. 1000 milliLiter(s) (50 mL/Hr) IV Continuous <Continuous>  dextrose 5%. 1000 milliLiter(s) (100 mL/Hr) IV Continuous <Continuous>  dextrose 50% Injectable 25 Gram(s) IV Push once  dextrose 50% Injectable 12.5 Gram(s) IV Push once  dextrose 50% Injectable 25 Gram(s) IV Push once  gabapentin 300 milliGRAM(s) Oral three times a day  glucagon  Injectable 1 milliGRAM(s) IntraMuscular once  heparin   Injectable 5000 Unit(s) SubCutaneous every 8 hours  hydrALAZINE 50 milliGRAM(s) Oral three times a day  insulin lispro (ADMELOG) corrective regimen sliding scale   SubCutaneous three times a day before meals  insulin lispro (ADMELOG) corrective regimen sliding scale   SubCutaneous at bedtime  insulin NPH human recombinant 30 Unit(s) SubCutaneous two times a day before meals  isosorbide   dinitrate Tablet (ISORDIL) 10 milliGRAM(s) Oral three times a day  metoprolol succinate  milliGRAM(s) Oral two times a day  multivitamin 1 Tablet(s) Oral daily    MEDICATIONS  (PRN):  acetaminophen   Tablet .. 650 milliGRAM(s) Oral every 6 hours PRN Temp greater or equal to 38C (100.4F), Mild Pain (1 - 3), Moderate Pain (4 - 6)  ketotifen 0.025% Ophthalmic Solution 1 Drop(s) Both EYES two times a day PRN Allergic Conjunctivitis      Vital Signs Last 24 Hrs  T(C): 36.8 (09 May 2021 05:22), Max: 36.8 (08 May 2021 17:34)  T(F): 98.3 (09 May 2021 05:22), Max: 98.3 (09 May 2021 05:22)  HR: 74 (09 May 2021 05:22) (58 - 74)  BP: 115/57 (09 May 2021 05:22) (115/57 - 137/71)  BP(mean): --  RR: 18 (09 May 2021 05:22) (18 - 18)  SpO2: 97% (09 May 2021 05:22) (96% - 100%)  CAPILLARY BLOOD GLUCOSE      POCT Blood Glucose.: 278 mg/dL (09 May 2021 08:34)  POCT Blood Glucose.: 335 mg/dL (08 May 2021 21:43)  POCT Blood Glucose.: 290 mg/dL (08 May 2021 17:14)  POCT Blood Glucose.: 386 mg/dL (08 May 2021 12:37)    I&O's Summary    08 May 2021 07:01  -  09 May 2021 07:00  --------------------------------------------------------  IN: 335 mL / OUT: 1750 mL / NET: -1415 mL      PHYSICAL EXAM:  GENERAL: NAD, well-developed  HEAD:  Atraumatic, Normocephalic  EYES: EOMI, PERRLA, conjunctiva and sclera clear  NECK: Supple, No JVD  CHEST/LUNG: decreased BS bilaterally; No wheeze  HEART: Regular rate and rhythm; No murmurs, rubs, or gallops  ABDOMEN: Soft, Nontender, Nondistended; Bowel sounds present  EXTREMITIES:  1+ B/L LE edema. 2+ Peripheral Pulses, No clubbing, or cyanosis  PSYCH: AAOx3  NEUROLOGY: non-focal  SKIN: No rashes or lesions    LABS:                        10.8   4.88  )-----------( 206      ( 09 May 2021 07:19 )             32.7     05-09    132<L>  |  88<L>  |  129<H>  ----------------------------<  276<H>  4.7   |  30  |  4.80<H>    Ca    10.4      09 May 2021 07:19  Phos  5.4     05-09  Mg     2.5     05-09                RADIOLOGY & ADDITIONAL TESTS:    Imaging Personally Reviewed:    Consultant(s) Notes Reviewed:      Care Discussed with Consultants/Other Providers:   Patient is a 71y old  Female who presents with a chief complaint of Worsening SOB (09 May 2021 09:53)      SUBJECTIVE / OVERNIGHT EVENTS:  Patient has no new complaints. Denies cp, SOB, abdominal pain, N/V/D     MEDICATIONS  (STANDING):  allopurinol 100 milliGRAM(s) Oral daily  aspirin enteric coated 81 milliGRAM(s) Oral daily  atorvastatin 80 milliGRAM(s) Oral at bedtime  buMETAnide 1 milliGRAM(s) Oral two times a day  dextrose 40% Gel 15 Gram(s) Oral once  dextrose 5%. 1000 milliLiter(s) (50 mL/Hr) IV Continuous <Continuous>  dextrose 5%. 1000 milliLiter(s) (100 mL/Hr) IV Continuous <Continuous>  dextrose 50% Injectable 25 Gram(s) IV Push once  dextrose 50% Injectable 12.5 Gram(s) IV Push once  dextrose 50% Injectable 25 Gram(s) IV Push once  gabapentin 300 milliGRAM(s) Oral three times a day  glucagon  Injectable 1 milliGRAM(s) IntraMuscular once  heparin   Injectable 5000 Unit(s) SubCutaneous every 8 hours  hydrALAZINE 50 milliGRAM(s) Oral three times a day  insulin lispro (ADMELOG) corrective regimen sliding scale   SubCutaneous three times a day before meals  insulin lispro (ADMELOG) corrective regimen sliding scale   SubCutaneous at bedtime  insulin NPH human recombinant 30 Unit(s) SubCutaneous two times a day before meals  isosorbide   dinitrate Tablet (ISORDIL) 10 milliGRAM(s) Oral three times a day  metoprolol succinate  milliGRAM(s) Oral two times a day  multivitamin 1 Tablet(s) Oral daily    MEDICATIONS  (PRN):  acetaminophen   Tablet .. 650 milliGRAM(s) Oral every 6 hours PRN Temp greater or equal to 38C (100.4F), Mild Pain (1 - 3), Moderate Pain (4 - 6)  ketotifen 0.025% Ophthalmic Solution 1 Drop(s) Both EYES two times a day PRN Allergic Conjunctivitis      Vital Signs Last 24 Hrs  T(C): 36.8 (09 May 2021 05:22), Max: 36.8 (08 May 2021 17:34)  T(F): 98.3 (09 May 2021 05:22), Max: 98.3 (09 May 2021 05:22)  HR: 74 (09 May 2021 05:22) (58 - 74)  BP: 115/57 (09 May 2021 05:22) (115/57 - 137/71)  BP(mean): --  RR: 18 (09 May 2021 05:22) (18 - 18)  SpO2: 97% (09 May 2021 05:22) (96% - 100%)  CAPILLARY BLOOD GLUCOSE      POCT Blood Glucose.: 278 mg/dL (09 May 2021 08:34)  POCT Blood Glucose.: 335 mg/dL (08 May 2021 21:43)  POCT Blood Glucose.: 290 mg/dL (08 May 2021 17:14)  POCT Blood Glucose.: 386 mg/dL (08 May 2021 12:37)    I&O's Summary    08 May 2021 07:01  -  09 May 2021 07:00  --------------------------------------------------------  IN: 335 mL / OUT: 1750 mL / NET: -1415 mL      PHYSICAL EXAM:  GENERAL: NAD, well-developed  HEAD:  Atraumatic, Normocephalic  EYES: EOMI, PERRLA, conjunctiva and sclera clear  NECK: Supple, No JVD  CHEST/LUNG: decreased BS bilaterally; No wheeze  HEART: Regular rate and rhythm; No murmurs, rubs, or gallops  ABDOMEN: Soft, Nontender, Nondistended; Bowel sounds present  EXTREMITIES:  1+ B/L LE edema. 2+ Peripheral Pulses, No clubbing, or cyanosis  PSYCH: AAOx3  NEUROLOGY: non-focal  SKIN: No rashes or lesions    LABS:                        10.8   4.88  )-----------( 206      ( 09 May 2021 07:19 )             32.7     05-09    132<L>  |  88<L>  |  129<H>  ----------------------------<  276<H>  4.7   |  30  |  4.80<H>    Ca    10.4      09 May 2021 07:19  Phos  5.4     05-09  Mg     2.5     05-09                RADIOLOGY & ADDITIONAL TESTS:    Imaging Personally Reviewed:    Consultant(s) Notes Reviewed:      Care Discussed with Consultants/Other Providers:

## 2021-05-10 DIAGNOSIS — E78.49 OTHER HYPERLIPIDEMIA: ICD-10-CM

## 2021-05-10 DIAGNOSIS — E11.65 TYPE 2 DIABETES MELLITUS WITH HYPERGLYCEMIA: ICD-10-CM

## 2021-05-10 DIAGNOSIS — I10 ESSENTIAL (PRIMARY) HYPERTENSION: ICD-10-CM

## 2021-05-10 DIAGNOSIS — E04.1 NONTOXIC SINGLE THYROID NODULE: ICD-10-CM

## 2021-05-10 LAB
ANION GAP SERPL CALC-SCNC: 17 MMOL/L — HIGH (ref 7–14)
BUN SERPL-MCNC: 143 MG/DL — HIGH (ref 7–23)
CALCIUM SERPL-MCNC: 9.9 MG/DL — SIGNIFICANT CHANGE UP (ref 8.4–10.5)
CHLORIDE SERPL-SCNC: 87 MMOL/L — LOW (ref 98–107)
CO2 SERPL-SCNC: 26 MMOL/L — SIGNIFICANT CHANGE UP (ref 22–31)
CREAT SERPL-MCNC: 4.79 MG/DL — HIGH (ref 0.5–1.3)
GLUCOSE BLDC GLUCOMTR-MCNC: 378 MG/DL — HIGH (ref 70–99)
GLUCOSE BLDC GLUCOMTR-MCNC: 378 MG/DL — HIGH (ref 70–99)
GLUCOSE BLDC GLUCOMTR-MCNC: 383 MG/DL — HIGH (ref 70–99)
GLUCOSE SERPL-MCNC: 288 MG/DL — HIGH (ref 70–99)
HCT VFR BLD CALC: 30.3 % — LOW (ref 34.5–45)
HGB BLD-MCNC: 9.9 G/DL — LOW (ref 11.5–15.5)
MAGNESIUM SERPL-MCNC: 2.5 MG/DL — SIGNIFICANT CHANGE UP (ref 1.6–2.6)
MCHC RBC-ENTMCNC: 28.2 PG — SIGNIFICANT CHANGE UP (ref 27–34)
MCHC RBC-ENTMCNC: 32.7 GM/DL — SIGNIFICANT CHANGE UP (ref 32–36)
MCV RBC AUTO: 86.3 FL — SIGNIFICANT CHANGE UP (ref 80–100)
NRBC # BLD: 0 /100 WBCS — SIGNIFICANT CHANGE UP
NRBC # FLD: 0 K/UL — SIGNIFICANT CHANGE UP
PHOSPHATE SERPL-MCNC: 5.8 MG/DL — HIGH (ref 2.5–4.5)
PLATELET # BLD AUTO: 182 K/UL — SIGNIFICANT CHANGE UP (ref 150–400)
POTASSIUM SERPL-MCNC: 4.8 MMOL/L — SIGNIFICANT CHANGE UP (ref 3.5–5.3)
POTASSIUM SERPL-SCNC: 4.8 MMOL/L — SIGNIFICANT CHANGE UP (ref 3.5–5.3)
RBC # BLD: 3.51 M/UL — LOW (ref 3.8–5.2)
RBC # FLD: 13.9 % — SIGNIFICANT CHANGE UP (ref 10.3–14.5)
SODIUM SERPL-SCNC: 130 MMOL/L — LOW (ref 135–145)
WBC # BLD: 5.59 K/UL — SIGNIFICANT CHANGE UP (ref 3.8–10.5)
WBC # FLD AUTO: 5.59 K/UL — SIGNIFICANT CHANGE UP (ref 3.8–10.5)

## 2021-05-10 PROCEDURE — 99233 SBSQ HOSP IP/OBS HIGH 50: CPT

## 2021-05-10 PROCEDURE — 99222 1ST HOSP IP/OBS MODERATE 55: CPT | Mod: GC

## 2021-05-10 RX ORDER — INSULIN LISPRO 100/ML
15 VIAL (ML) SUBCUTANEOUS
Refills: 0 | Status: DISCONTINUED | OUTPATIENT
Start: 2021-05-10 | End: 2021-05-11

## 2021-05-10 RX ORDER — INSULIN LISPRO 100/ML
VIAL (ML) SUBCUTANEOUS AT BEDTIME
Refills: 0 | Status: DISCONTINUED | OUTPATIENT
Start: 2021-05-10 | End: 2021-05-22

## 2021-05-10 RX ORDER — INSULIN LISPRO 100/ML
VIAL (ML) SUBCUTANEOUS
Refills: 0 | Status: DISCONTINUED | OUTPATIENT
Start: 2021-05-10 | End: 2021-05-22

## 2021-05-10 RX ORDER — INSULIN GLARGINE 100 [IU]/ML
45 INJECTION, SOLUTION SUBCUTANEOUS AT BEDTIME
Refills: 0 | Status: DISCONTINUED | OUTPATIENT
Start: 2021-05-10 | End: 2021-05-11

## 2021-05-10 RX ADMIN — Medication 50 MILLIGRAM(S): at 05:03

## 2021-05-10 RX ADMIN — Medication 50 MILLIGRAM(S): at 21:27

## 2021-05-10 RX ADMIN — Medication 81 MILLIGRAM(S): at 11:38

## 2021-05-10 RX ADMIN — BUMETANIDE 1 MILLIGRAM(S): 0.25 INJECTION INTRAMUSCULAR; INTRAVENOUS at 17:02

## 2021-05-10 RX ADMIN — Medication 15 UNIT(S): at 18:13

## 2021-05-10 RX ADMIN — Medication 6: at 21:27

## 2021-05-10 RX ADMIN — ISOSORBIDE DINITRATE 10 MILLIGRAM(S): 5 TABLET ORAL at 05:03

## 2021-05-10 RX ADMIN — BUMETANIDE 1 MILLIGRAM(S): 0.25 INJECTION INTRAMUSCULAR; INTRAVENOUS at 05:03

## 2021-05-10 RX ADMIN — HEPARIN SODIUM 5000 UNIT(S): 5000 INJECTION INTRAVENOUS; SUBCUTANEOUS at 21:27

## 2021-05-10 RX ADMIN — HEPARIN SODIUM 5000 UNIT(S): 5000 INJECTION INTRAVENOUS; SUBCUTANEOUS at 05:03

## 2021-05-10 RX ADMIN — INSULIN GLARGINE 45 UNIT(S): 100 INJECTION, SOLUTION SUBCUTANEOUS at 21:26

## 2021-05-10 RX ADMIN — Medication 10: at 18:13

## 2021-05-10 RX ADMIN — Medication 50 MILLIGRAM(S): at 13:16

## 2021-05-10 RX ADMIN — GABAPENTIN 300 MILLIGRAM(S): 400 CAPSULE ORAL at 21:26

## 2021-05-10 RX ADMIN — Medication 5: at 12:33

## 2021-05-10 RX ADMIN — Medication 100 MILLIGRAM(S): at 11:37

## 2021-05-10 RX ADMIN — ISOSORBIDE DINITRATE 10 MILLIGRAM(S): 5 TABLET ORAL at 21:27

## 2021-05-10 RX ADMIN — Medication 1 TABLET(S): at 11:37

## 2021-05-10 RX ADMIN — Medication 100 MILLIGRAM(S): at 17:02

## 2021-05-10 RX ADMIN — HUMAN INSULIN 35 UNIT(S): 100 INJECTION, SUSPENSION SUBCUTANEOUS at 08:58

## 2021-05-10 RX ADMIN — GABAPENTIN 300 MILLIGRAM(S): 400 CAPSULE ORAL at 05:03

## 2021-05-10 RX ADMIN — HEPARIN SODIUM 5000 UNIT(S): 5000 INJECTION INTRAVENOUS; SUBCUTANEOUS at 13:17

## 2021-05-10 RX ADMIN — Medication 3: at 08:57

## 2021-05-10 RX ADMIN — ATORVASTATIN CALCIUM 80 MILLIGRAM(S): 80 TABLET, FILM COATED ORAL at 21:26

## 2021-05-10 RX ADMIN — GABAPENTIN 300 MILLIGRAM(S): 400 CAPSULE ORAL at 13:16

## 2021-05-10 RX ADMIN — Medication 100 MILLIGRAM(S): at 05:03

## 2021-05-10 RX ADMIN — ISOSORBIDE DINITRATE 10 MILLIGRAM(S): 5 TABLET ORAL at 13:16

## 2021-05-10 NOTE — CONSULT NOTE ADULT - PROBLEM SELECTOR RECOMMENDATION 4
RLP hypoechoic nodule 13mm and LLP isoechoic nodule 1.9 cm. Both benign according to pt in the past but would need records.   -f/u outpatient with records

## 2021-05-10 NOTE — CONSULT NOTE ADULT - PROBLEM SELECTOR RECOMMENDATION 9
T2DM with hgb a1c of 6.8 with FS tightly controlled but on unconventional regimen of BID NPH and regular insulin, states it may have been due to cost.   -Switch NPh to lantus 45 units qhs   -Start Admelog 15 units tidac   -Admelog moderate dose correction scale tidac and qhs     Discharge   B/B insulin with lantus and humalog if cost of pen is reasonable, if not then can use novolin 70/30 pen   Would benefit from Endocrinologist f/u   Dr. Lino   [phone]  [address] [facility] [address] [facility], [place] [address]

## 2021-05-10 NOTE — PROGRESS NOTE ADULT - PROBLEM SELECTOR PLAN 6
-FS still suboptimally controlled.  -Continue NPH BID for now.   -Was also placed on admelog ISS and premeals.  -Will request endocrine input for further insulin optimize, which will be further challenged by the doubling of her Cr.   - cont gabapentin for her neuropathy  - c/w atorvastatin,

## 2021-05-10 NOTE — CONSULT NOTE ADULT - ATTENDING COMMENTS
DM2 complicated by neuropathy, CHF, HbA1c 6.8% but here with severe hyperglycemia.  Inpatient adjust basal bolus regimen as outlined.  For dc check insurance cost for basal bolus pens, if too expensive will recommend Novolin 70/30.    Frances Lynch MD  Division of Endocrinology  Pager: [phone]    If after 6PM or before 9AM, or on weekends/holidays, please call endocrine answering service for assistance ([phone]).  For nonurgent matters email [email or web address] for assistance.

## 2021-05-10 NOTE — PROGRESS NOTE ADULT - ASSESSMENT
71 y/p female with PMHX of HTN, DM II, HLD, PAD s/p RLE angiogram, CKD comes in with complaints of worsening SOB, LE edema, abdominal distension.   CXR shows clear lungs, LE dopplers show no DVT, CT head unremarkable. Labs significant for BUN/Cr 46/2.43, K 5.2, proBNP 303. She states she was recently told she had a weak heart and has been placed on oral diuretics, but with minimal relief. She admits to 4 pillow orthopnea, BREEN w/ minimal exertion. No PND, CP, palpitations, dizziness. She has had a stress test with her cardiologist Dr. Alas a few months ago, but unclear of the results. Admits to drinking 1 pint of vodka most weekends. Non smoker, no other drug use.    5/4/21 RHC: RA 20; PA systolic 69, PAD 21, PA mean 44, PCWP 25-30 with respiratory variation, Hemoglobin 9.9, PA sat 70's, CO 8.4/ CI 4- volume overloaded        She was moderately hypervolemic, but improving.

## 2021-05-10 NOTE — PROGRESS NOTE ADULT - ATTENDING COMMENTS
Concerned with worsening azotemia and continuing evidence of fluid OL.  Would ask Renal about possibility of short course of HD for BUN clearance and fluid removal. I doubt the current dose of Bumex will be sufficient to keep volume off.

## 2021-05-10 NOTE — PROGRESS NOTE ADULT - PROBLEM SELECTOR PLAN 2
-Cr appears stable today. RADHA likely 2' overdiuresis.  -Imaging of kidneys unremarkable.  -Diuresis as above.  -F/u nephrology. Avoid nephrotoxins.  -Trend labs.

## 2021-05-10 NOTE — CONSULT NOTE ADULT - SUBJECTIVE AND OBJECTIVE BOX
HPI:  This is a 71F with history of DM2 with neuropathy, HTN, Recently diagnosed CKD, PAD s/p angiogram RLE (pt denies any stents, just "clearing out"), CKD, HLD, and Thyroid nodules (benign biopsy x2 as per patient) who presents to the hospital with complaints of worsening SOB, LE edema, orthopnea, weight gain, and increasing abdominal girth.    Endocrine History:  T2DM since , was initially on orals but switched to insulin for at least a couple of years. Follows endocrine NP, currnetly on NPH 25 units BID and Regular insulin 5 units BID before breakfast and at bedtime. FS are 70s-100 fasting with hypoglycemic event once every other week. Does not follow a particular diabetic diet but tries to avoid sweets and juice/soda.  Has CKD 4, peripheral neuropathy, and heat failure. Unclear if HF is from alcohol.     Hx of thyroid nodule, states that bilateral FNA were benign in the past. Denies FH of thyroid ca or hx of head/neck radiation. No compressive symptoms.     PAST MEDICAL & SURGICAL HISTORY:  CKD (chronic kidney disease)    Diabetes    HLD (hyperlipidemia)    HTN (hypertension)    PVD (peripheral vascular disease)    CHF (congestive heart failure)    Peripheral neuropathy    History of angioplasty of peripheral vessel  RLE - no stents    History of hysterectomy    History of breast biopsy  bilateral - benign    S/P thyroid biopsy  benign    History of cataract surgery  bilateral eyes        FAMILY HISTORY:  Lung cancer  mother    Family history of chronic renal failure (Sibling)  sister  from renal failure @ age 35    Family history of myocardial infarction (Sibling)  sister        Social History: No history of tobacco,or illicit drug use. Etoh abuse for many years, 1 pint of vokda on weekends.     Outpatient Medications: Home Medications:  allopurinol 100 mg oral tablet: 1 tab(s) orally once a day (2021 22:27)  amLODIPine 5 mg oral tablet: 1 tab(s) orally once a day (2021 22:27)  Aspirin Enteric Coated 81 mg oral delayed release tablet: 1 tab(s) orally once a day (2021 22:27)  atorvastatin 80 mg oral tablet: 1 tab(s) orally once a day (2021 22:27)  Coreg 6.25 mg oral tablet: 1 tab(s) orally 2 times a day (2021 22:27)  gabapentin 300 mg oral tablet: 1 tab(s) orally 3 times a day (2021 22:27)  hydrALAZINE 100 mg oral tablet: 1 tab(s) orally 3 times a day (2021 22:27)  NovoLIN N 100 units/mL subcutaneous suspension: 25 unit(s) subcutaneous 2 times a day (10am and 10pm) (2021 22:27)  NovoLIN R 100 units/mL injectable solution: 5 unit(s) injectable 2 times a day (10am and 10pm) (2021 22:27)  olopatadine 0.1% ophthalmic solution: 1 drop(s) to both eye 2 times a day (2021 22:27)  torsemide 20 mg oral tablet: 1 tab(s) orally every other day (2021 22:27)  Vitamin B Complex 100: 1 tab(s) orally once a day (2021 22:27)      MEDICATIONS  (STANDING):  allopurinol 100 milliGRAM(s) Oral daily  aspirin enteric coated 81 milliGRAM(s) Oral daily  atorvastatin 80 milliGRAM(s) Oral at bedtime  buMETAnide 1 milliGRAM(s) Oral two times a day  dextrose 40% Gel 15 Gram(s) Oral once  dextrose 5%. 1000 milliLiter(s) (50 mL/Hr) IV Continuous <Continuous>  dextrose 5%. 1000 milliLiter(s) (100 mL/Hr) IV Continuous <Continuous>  dextrose 50% Injectable 25 Gram(s) IV Push once  dextrose 50% Injectable 12.5 Gram(s) IV Push once  dextrose 50% Injectable 25 Gram(s) IV Push once  gabapentin 300 milliGRAM(s) Oral three times a day  glucagon  Injectable 1 milliGRAM(s) IntraMuscular once  heparin   Injectable 5000 Unit(s) SubCutaneous every 8 hours  hydrALAZINE 50 milliGRAM(s) Oral three times a day  insulin glargine Injectable (LANTUS) 45 Unit(s) SubCutaneous at bedtime  insulin lispro (ADMELOG) corrective regimen sliding scale   SubCutaneous three times a day before meals  insulin lispro (ADMELOG) corrective regimen sliding scale   SubCutaneous at bedtime  insulin lispro Injectable (ADMELOG) 15 Unit(s) SubCutaneous three times a day before meals  isosorbide   dinitrate Tablet (ISORDIL) 10 milliGRAM(s) Oral three times a day  metoprolol succinate  milliGRAM(s) Oral two times a day  multivitamin 1 Tablet(s) Oral daily    MEDICATIONS  (PRN):  acetaminophen   Tablet .. 650 milliGRAM(s) Oral every 6 hours PRN Temp greater or equal to 38C (100.4F), Mild Pain (1 - 3), Moderate Pain (4 - 6)  ketotifen 0.025% Ophthalmic Solution 1 Drop(s) Both EYES two times a day PRN Allergic Conjunctivitis      Allergies    No Known Allergies    Intolerances      Review of Systems:  Constitutional: No fever, chills   Neuro: No tremors, headache   Cardiovascular: No chest pain, palpitations  Respiratory: No SOB, no cough  GI: No nausea, vomiting, abdominal pain  : No dysuria, polyuria   Skin: no rash, ulcers   Psych: no depression, anxiety   Endocrine: no polyphagia, polydipsia     ALL OTHER SYSTEMS REVIEWED AND NEGATIVE        PHYSICAL EXAM:  VITALS: T(C): 36.7 (05-10-21 @ 11:24)  T(F): 98.1 (05-10-21 @ 11:24), Max: 98.2 (21 @ 21:54)  HR: 66 (05-10-21 @ 13:13) (62 - 67)  BP: 152/96 (05-10-21 @ 13:13) (121/60 - 152/96)  RR:  (16 - 18)  SpO2:  (96% - 99%)  Wt(kg): --  GENERAL: NAD, well-groomed, well-developed  EYES: No proptosis, anicteric  HEENT:  Atraumatic, Normocephalic, moist mucous membranes  THYROID: Normal size, right sided nodule, nontender.   RESPIRATORY: Clear to auscultation bilaterally; No rales, rhonchi, wheezing  CARDIOVASCULAR: Regular rate and rhythm; No murmurs  GI: Soft, nontender, non distended, normal bowel sounds  SKIN: Dry, intact, No rashes or lesions  NEURO: AOx3, moves all extremities spontaneously   PSYCH: Reactive affect, euthymic mood    POCT Blood Glucose.: 396 mg/dL (05-10-21 @ 12:23)  POCT Blood Glucose.: 392 mg/dL (05-10-21 @ 12:22)  POCT Blood Glucose.: 264 mg/dL (05-10-21 @ 08:23)  POCT Blood Glucose.: 312 mg/dL (21 @ 22:27)  POCT Blood Glucose.: 298 mg/dL (21 @ 17:11)  POCT Blood Glucose.: 360 mg/dL (21 @ 12:28)  POCT Blood Glucose.: 278 mg/dL (21 @ 08:34)  POCT Blood Glucose.: 335 mg/dL (21 @ 21:43)  POCT Blood Glucose.: 290 mg/dL (21 @ 17:14)  POCT Blood Glucose.: 386 mg/dL (21 @ 12:37)  POCT Blood Glucose.: 300 mg/dL (21 @ 08:36)  POCT Blood Glucose.: 392 mg/dL (21 @ 22:17)  POCT Blood Glucose.: 337 mg/dL (21 @ 17:53)                            9.9    5.59  )-----------( 182      ( 10 May 2021 08:27 )             30.3       05-10    130<L>  |  87<L>  |  143<H>  ----------------------------<  288<H>  4.8   |  26  |  4.79<H>    EGFR if : 10<L>  EGFR if non : 8<L>    Ca    9.9      05-10  Mg     2.5     05-10  Phos  5.8     05-10        Thyroid Function Tests:   @ 07:41 TSH 2.19 FreeT4 -- T3 -- Anti TPO -- Anti Thyroglobulin Ab -- TSI --       Chol 132 Direct LDL -- LDL calculated 72 HDL 37<L> Trig 117        A1C with Estimated Average Glucose Result: 6.8 % (21 @ 07:41)      Radiology:

## 2021-05-10 NOTE — PROGRESS NOTE ADULT - SUBJECTIVE AND OBJECTIVE BOX
Patient states she feels a lot better, walking in the hallways with PT and did not have BREEN.   No CP, palpitations, dizziness.       Medications:  acetaminophen   Tablet .. 650 milliGRAM(s) Oral every 6 hours PRN  allopurinol 100 milliGRAM(s) Oral daily  aspirin enteric coated 81 milliGRAM(s) Oral daily  atorvastatin 80 milliGRAM(s) Oral at bedtime  buMETAnide 1 milliGRAM(s) Oral two times a day  dextrose 40% Gel 15 Gram(s) Oral once  dextrose 5%. 1000 milliLiter(s) IV Continuous <Continuous>  dextrose 5%. 1000 milliLiter(s) IV Continuous <Continuous>  dextrose 50% Injectable 25 Gram(s) IV Push once  dextrose 50% Injectable 12.5 Gram(s) IV Push once  dextrose 50% Injectable 25 Gram(s) IV Push once  gabapentin 300 milliGRAM(s) Oral three times a day  glucagon  Injectable 1 milliGRAM(s) IntraMuscular once  heparin   Injectable 5000 Unit(s) SubCutaneous every 8 hours  hydrALAZINE 50 milliGRAM(s) Oral three times a day  insulin lispro (ADMELOG) corrective regimen sliding scale   SubCutaneous three times a day before meals  insulin lispro (ADMELOG) corrective regimen sliding scale   SubCutaneous at bedtime  insulin NPH human recombinant 35 Unit(s) SubCutaneous two times a day before meals  isosorbide   dinitrate Tablet (ISORDIL) 10 milliGRAM(s) Oral three times a day  ketotifen 0.025% Ophthalmic Solution 1 Drop(s) Both EYES two times a day PRN  metoprolol succinate  milliGRAM(s) Oral two times a day  multivitamin 1 Tablet(s) Oral daily      Vitals:  Vital Signs Last 24 Hrs  T(C): 36.7 (10 May 2021 11:24), Max: 36.9 (09 May 2021 12:46)  T(F): 98.1 (10 May 2021 11:24), Max: 98.5 (09 May 2021 12:46)  HR: 64 (10 May 2021 11:24) (62 - 67)  BP: 152/83 (10 May 2021 11:24) (121/60 - 152/83)  BP(mean): --  RR: 17 (10 May 2021 11:24) (16 - 18)  SpO2: 97% (10 May 2021 11:24) (96% - 99%)    Daily     Daily Weight in k.1 (10 May 2021 05:35)    I&O's Detail    09 May 2021 07:01  -  10 May 2021 07:00  --------------------------------------------------------  IN:    Oral Fluid: 200 mL  Total IN: 200 mL    OUT:    Voided (mL): 1000 mL  Total OUT: 1000 mL    Total NET: -800 mL      10 May 2021 07:01  -  10 May 2021 12:08  --------------------------------------------------------  IN:  Total IN: 0 mL    OUT:    Voided (mL): 520 mL  Total OUT: 520 mL    Total NET: -520 mL          Physical Exam:     General: No distress. Comfortable.  HEENT: EOM intact.  Neck: Neck supple. JVP not elevated. No masses  Chest: Clear to auscultation bilaterally  CV: Normal S1 and S2. No murmurs, rub, or gallops. Radial pulses normal. 1+ tight b/l LE edema. Warm b/l.   Abdomen: Soft, non-distended, non-tender  Skin: No rashes or skin breakdown  Neurology: Alert and oriented times three. Sensation intact  Psych: Affect normal    Labs:                        9.9    5.59  )-----------( 182      ( 10 May 2021 08:27 )             30.3     05-10    130<L>  |  87<L>  |  143<H>  ----------------------------<  288<H>  4.8   |  26  |  4.79<H>    Ca    9.9      10 May 2021 08:27  Phos  5.8     05-10  Mg     2.5     05-10

## 2021-05-10 NOTE — PROGRESS NOTE ADULT - PROBLEM SELECTOR PLAN 1
-Symptomatically improving. BUN/Cr rising.   -Diuresed 1L in 24 hrs, net neg 800 ml. Wt is 222 lbs, down from 232 on admission.   -Bumex gtt has been discontinued and started on Bumex 1 mg po BID.   - continue hydralazine 50 tid with isordil 10 tid  -Continue Toprol 100 mg po BID.   -Keep k 4.0-5.0 and mag 2.0.   -Daily standing weights and strict I/O.

## 2021-05-10 NOTE — PROGRESS NOTE ADULT - ASSESSMENT
71F PMH of DM2 with neuropathy, HTN, CKD, PAD s/p angiogram RLE, HLD, and Thyroid nodules (benign biopsy x2 as per patient) who presents to the hospital with complaints of worsening SOB, LE edema, orthopnea, weight gain, and increasing abdominal girth likely 2/2 CHF exacerbation. Started on bumex gtt. Renal following for RADHA on CKD.       labs, chart reviewed  For any question, call:  Cell # [phone]  Pager # [phone]  office # [phone]

## 2021-05-10 NOTE — PROGRESS NOTE ADULT - PROBLEM SELECTOR PLAN 8
DVT ppx - Heparin 5,000u sc tid    Dispo- pending further improvement of renal function.  PT eval - anticipating home.

## 2021-05-10 NOTE — PROGRESS NOTE ADULT - PROBLEM SELECTOR PLAN 3
-No active CP. No significant tele events.   -Troponins flat. Lower suspicion for ACS.   -Likely 2' T2MI in setting of decompensated HF and also elevation from baseline CKD.

## 2021-05-10 NOTE — CONSULT NOTE ADULT - PROBLEM SELECTOR PROBLEM 1
Type 2 diabetes mellitus with hyperglycemia, with long-term current use of insulin
Acute kidney injury superimposed on CKD
Shortness of breath

## 2021-05-10 NOTE — PROGRESS NOTE ADULT - PROBLEM SELECTOR PLAN 1
- Acute on chronic HFpEF.  - s/p RHC 5/4 c/w severe pulm HTN  - TTE normal LVEF  - S/p metolazone x2. IV Bumex switched to PO 1mg BID\  -continue hydralazine 50 tid, isordil 10 tid, Toprol 100 mg po BID.   - 1.5L fluid restriction, daily weights, I/O   - F/U heart failure team recs.

## 2021-05-10 NOTE — PROGRESS NOTE ADULT - SUBJECTIVE AND OBJECTIVE BOX
[facility] Division of Hospital Medicine  Chad Newman) MD Michael  Pager [phone]    SUBJECTIVE:  Follow up for HFpEF.    The patient was seen and evaluated at bedside this AM. No o/n events. Denies any SOB/CP/NV. Feels well. no complaints. Eating well. Voiding w/o any issues.      ROS: All systems negative except as noted.      Vital Signs Last 24 Hrs  T(C): 36.7 (10 May 2021 11:24), Max: 36.9 (09 May 2021 12:46)  T(F): 98.1 (10 May 2021 11:24), Max: 98.5 (09 May 2021 12:46)  HR: 64 (10 May 2021 11:24) (62 - 67)  BP: 152/83 (10 May 2021 11:24) (121/60 - 152/83)  BP(mean): --  RR: 17 (10 May 2021 11:24) (16 - 18)  SpO2: 97% (10 May 2021 11:24) (96% - 99%)      PHYSICAL EXAM:  Gen- In bed, comfortable, NAD  Eyes- EOMI, PERRLA, nonicteric.  EMNT- Fair dentition. MMM. No tonsilar exudates. No posterior pharynx erythema.  Neck- Supple. No masses. No tracheal deviation.  Resp- CTAB, good effort. No r/r/w. No accessory muscle use.  CVS- RRR, S1S2, no g/r/m. Trace LE edema.  GI- Soft abd, NT, ND, +BSx4. No HSM.  MSK- No C/C. ROM intact. No crepitus.  Neuro- CN II-XII intact. Speech fluent/face symmetric. Sensation intact.  Skin- No rashes/ulcers. Warm/moist.  Psych- AAOx3. Appropriate mood/affect.      MEDICATION:  MEDICATIONS  (STANDING):  allopurinol 100 milliGRAM(s) Oral daily  aspirin enteric coated 81 milliGRAM(s) Oral daily  atorvastatin 80 milliGRAM(s) Oral at bedtime  buMETAnide 1 milliGRAM(s) Oral two times a day  dextrose 40% Gel 15 Gram(s) Oral once  dextrose 5%. 1000 milliLiter(s) (50 mL/Hr) IV Continuous <Continuous>  dextrose 5%. 1000 milliLiter(s) (100 mL/Hr) IV Continuous <Continuous>  dextrose 50% Injectable 25 Gram(s) IV Push once  dextrose 50% Injectable 12.5 Gram(s) IV Push once  dextrose 50% Injectable 25 Gram(s) IV Push once  gabapentin 300 milliGRAM(s) Oral three times a day  glucagon  Injectable 1 milliGRAM(s) IntraMuscular once  heparin   Injectable 5000 Unit(s) SubCutaneous every 8 hours  hydrALAZINE 50 milliGRAM(s) Oral three times a day  insulin lispro (ADMELOG) corrective regimen sliding scale   SubCutaneous three times a day before meals  insulin lispro (ADMELOG) corrective regimen sliding scale   SubCutaneous at bedtime  insulin NPH human recombinant 35 Unit(s) SubCutaneous two times a day before meals  isosorbide   dinitrate Tablet (ISORDIL) 10 milliGRAM(s) Oral three times a day  metoprolol succinate  milliGRAM(s) Oral two times a day  multivitamin 1 Tablet(s) Oral daily    MEDICATIONS  (PRN):  acetaminophen   Tablet .. 650 milliGRAM(s) Oral every 6 hours PRN Temp greater or equal to 38C (100.4F), Mild Pain (1 - 3), Moderate Pain (4 - 6)  ketotifen 0.025% Ophthalmic Solution 1 Drop(s) Both EYES two times a day PRN Allergic Conjunctivitis            LABORATORY:                          9.9    5.59  )-----------( 182      ( 10 May 2021 08:27 )             30.3     05-10    130<L>  |  87<L>  |  143<H>  ----------------------------<  288<H>  4.8   |  26  |  4.79<H>    Ca    9.9      10 May 2021 08:27  Phos  5.8     05-10  Mg     2.5     05-10        COVID-19 PCR: Cedric (29 Apr 2021 10:24)               [facility] Division of Hospital Medicine  Chad Newman) MD Michael  Pager [phone]    SUBJECTIVE:  Follow up for HFpEF.    The patient was seen and evaluated at bedside this AM. No o/n events. Denies any SOB/CP/NV. Feels well. no complaints. Eating well. Voiding w/o any issues.      ROS: All systems negative except as noted.      Vital Signs Last 24 Hrs  T(C): 36.7 (10 May 2021 11:24), Max: 36.9 (09 May 2021 12:46)  T(F): 98.1 (10 May 2021 11:24), Max: 98.5 (09 May 2021 12:46)  HR: 64 (10 May 2021 11:24) (62 - 67)  BP: 152/83 (10 May 2021 11:24) (121/60 - 152/83)  BP(mean): --  RR: 17 (10 May 2021 11:24) (16 - 18)  SpO2: 97% (10 May 2021 11:24) (96% - 99%)      PHYSICAL EXAM:  Gen- In bed, comfortable, NAD  Eyes- EOMI, PERRLA, nonicteric.  EMNT- Fair dentition. MMM. No tonsilar exudates. No posterior pharynx erythema.  Neck- Supple. No masses. No tracheal deviation.  Resp- CTAB, good effort. No r/r/w. No accessory muscle use.  CVS- RRR, S1S2, no g/r/m. Trace LE edema.  GI- Soft abd, NT, ND, +BSx4. No HSM.  MSK- No C/C. ROM intact. No crepitus.  Neuro- CN II-XII intact. Speech fluent/face symmetric. Sensation intact.  Skin- No rashes/ulcers. Warm/moist.  Psych- AAOx3. Appropriate mood/affect.      MEDICATION:  MEDICATIONS  (STANDING):  allopurinol 100 milliGRAM(s) Oral daily  aspirin enteric coated 81 milliGRAM(s) Oral daily  atorvastatin 80 milliGRAM(s) Oral at bedtime  buMETAnide 1 milliGRAM(s) Oral two times a day  dextrose 40% Gel 15 Gram(s) Oral once  dextrose 5%. 1000 milliLiter(s) (50 mL/Hr) IV Continuous <Continuous>  dextrose 5%. 1000 milliLiter(s) (100 mL/Hr) IV Continuous <Continuous>  dextrose 50% Injectable 25 Gram(s) IV Push once  dextrose 50% Injectable 12.5 Gram(s) IV Push once  dextrose 50% Injectable 25 Gram(s) IV Push once  gabapentin 300 milliGRAM(s) Oral three times a day  glucagon  Injectable 1 milliGRAM(s) IntraMuscular once  heparin   Injectable 5000 Unit(s) SubCutaneous every 8 hours  hydrALAZINE 50 milliGRAM(s) Oral three times a day  insulin lispro (ADMELOG) corrective regimen sliding scale   SubCutaneous three times a day before meals  insulin lispro (ADMELOG) corrective regimen sliding scale   SubCutaneous at bedtime  insulin NPH human recombinant 35 Unit(s) SubCutaneous two times a day before meals  isosorbide   dinitrate Tablet (ISORDIL) 10 milliGRAM(s) Oral three times a day  metoprolol succinate  milliGRAM(s) Oral two times a day  multivitamin 1 Tablet(s) Oral daily    MEDICATIONS  (PRN):  acetaminophen   Tablet .. 650 milliGRAM(s) Oral every 6 hours PRN Temp greater or equal to 38C (100.4F), Mild Pain (1 - 3), Moderate Pain (4 - 6)  ketotifen 0.025% Ophthalmic Solution 1 Drop(s) Both EYES two times a day PRN Allergic Conjunctivitis            LABORATORY:                          9.9    5.59  )-----------( 182      ( 10 May 2021 08:27 )             30.3     05-10    130<L>  |  87<L>  |  143<H>  ----------------------------<  288<H>  4.8   |  26  |  4.79<H>    Ca    9.9      10 May 2021 08:27  Phos  5.8     05-10  Mg     2.5     05-10        COVID-19 PCR: NotDetec (29 Apr 2021 10:24)      No significant tele events. Monitor events were reviewed. Pt remains in SR.

## 2021-05-10 NOTE — PROGRESS NOTE ADULT - PROBLEM SELECTOR PLAN 1
Pt with CKD3, follows with Dr Escudero in outpt clinic.   Baseline Cr ~2 from earlier this year.   Admission cr 2.4, -> 4.8  No  obstruction on renal US.   No significant proteinuria on UA.  BP stable, no obvious nephrotoxin.   Nonologuric, and responsive to diuretics.   currently renal function worsening, and BUN rising faster, possibly from overly brisk diuresis  HF has decreased bumens to 1mg PO QD  if no improvement with lower diuretic dose, patient may need HD soon  no urgent indication for dialysis at this time  monitor daily BMP  dose all meds for eGFR<15ml/min.   avoid ACEi/ARB/NSAIDs/Nephrotoxics. Pt with CKD3, follows with Dr Escudero in outpt clinic.   Baseline Cr ~2 from earlier this year.   Admission cr 2.4, -> 4.8 -> 4.79 today, appears plateauing  No  obstruction on renal US. No significant proteinuria on UA. BP stable, no obvious nephrotoxin.   Non-oliguric, and responsive to diuretics.   worsening renal function, and BUN rising faster, possibly from overly brisk diuresis  Bumex gtt has been discontinued and started on Bumex 1 mg po BID 5/9 by HF team. pt has lost 10lbs since admission   if no improvement in renal function, patient likely needs HD. explained to pt, verbalized understanding  no urgent indication for dialysis at this time  monitor daily BMP  dose all meds for eGFR<15ml/min.   avoid ACEi/ARB/NSAIDs/Nephrotoxics.

## 2021-05-10 NOTE — CONSULT NOTE ADULT - ASSESSMENT
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 Endocrine consulted for uncontrolled DM2 with hyperglycemia inpatient.

## 2021-05-10 NOTE — PROGRESS NOTE ADULT - SUBJECTIVE AND OBJECTIVE BOX
[facility] - S Collette / Donna S /D Aroldo/ ROSALIA Magallanes/ ROSALIA Morocho/ Ben Escudero / URIEL Raymundo/ O Dominic  service -[phone], office [phone]  ---------------------------------------------------------------------------------------------------------------    Patient seen and examined bedside    Subjective and Objective: No overnight events, sob resolved. No complaints today. feeling better    Allergies: No Known Allergies      Hospital Medications:   MEDICATIONS  (STANDING):  allopurinol 100 milliGRAM(s) Oral daily  aspirin enteric coated 81 milliGRAM(s) Oral daily  atorvastatin 80 milliGRAM(s) Oral at bedtime  buMETAnide 1 milliGRAM(s) Oral two times a day  dextrose 40% Gel 15 Gram(s) Oral once  dextrose 5%. 1000 milliLiter(s) (50 mL/Hr) IV Continuous <Continuous>  dextrose 5%. 1000 milliLiter(s) (100 mL/Hr) IV Continuous <Continuous>  dextrose 50% Injectable 25 Gram(s) IV Push once  dextrose 50% Injectable 12.5 Gram(s) IV Push once  dextrose 50% Injectable 25 Gram(s) IV Push once  gabapentin 300 milliGRAM(s) Oral three times a day  glucagon  Injectable 1 milliGRAM(s) IntraMuscular once  heparin   Injectable 5000 Unit(s) SubCutaneous every 8 hours  hydrALAZINE 50 milliGRAM(s) Oral three times a day  insulin lispro (ADMELOG) corrective regimen sliding scale   SubCutaneous at bedtime  insulin lispro (ADMELOG) corrective regimen sliding scale   SubCutaneous three times a day before meals  insulin NPH human recombinant 35 Unit(s) SubCutaneous two times a day before meals  isosorbide   dinitrate Tablet (ISORDIL) 10 milliGRAM(s) Oral three times a day  metoprolol succinate  milliGRAM(s) Oral two times a day  multivitamin 1 Tablet(s) Oral daily      VITALS:  T(F): 98.1 (05-10-21 @ 11:24), Max: 98.2 (05-09-21 @ 21:54)  HR: 66 (05-10-21 @ 13:13)  BP: 152/96 (05-10-21 @ 13:13)  RR: 17 (05-10-21 @ 13:13)  SpO2: 96% (05-10-21 @ 13:13)  Wt(kg): --    05-09 @ 07:01  -  05-10 @ 07:00  --------------------------------------------------------  IN: 200 mL / OUT: 1000 mL / NET: -800 mL    05-10 @ 07:01  -  05-10 @ 15:30  --------------------------------------------------------  IN: 0 mL / OUT: 520 mL / NET: -520 mL      PHYSICAL EXAM:  Constitutional: NAD  HEENT: anicteric sclera  Neck: No JVD  Respiratory: CTAB, no wheezes, rales or rhonchi  Cardiovascular: S1, S2, RRR  Gastrointestinal: BS+, soft, NT/ND  Extremities: No peripheral edema  Neurological: A/O x 3, no focal deficits  Psychiatric: Normal mood, normal affect  : No paredes.     LABS:  05-10    130<L>  |  87<L>  |  143<H>  ----------------------------<  288<H>  4.8   |  26  |  4.79<H>    Ca    9.9      10 May 2021 08:27  Phos  5.8     05-10  Mg     2.5     05-10      Creatinine Trend: 4.79 <--, 4.80 <--, 4.17 <--, 3.80 <--, 3.96 <--, 3.99 <--, 4.30 <--                        9.9    5.59  )-----------( 182      ( 10 May 2021 08:27 )             30.3     Urine Studies:        RADIOLOGY & ADDITIONAL STUDIES:   [facility] - S Collette / Donan S /D Aroldo/ S Elpidio/ ROSALIA Morocho/ Ben Escudero / URIEL Groveu/ O Dominic  service -[phone], office [phone]  ---------------------------------------------------------------------------------------------------------------    Patient seen and examined bedside    Subjective and Objective: No overnight events, sob resolved. No complaints today. feeling better. denied N/V/urinary sxs    Allergies: No Known Allergies      Hospital Medications:   MEDICATIONS  (STANDING):  allopurinol 100 milliGRAM(s) Oral daily  aspirin enteric coated 81 milliGRAM(s) Oral daily  atorvastatin 80 milliGRAM(s) Oral at bedtime  buMETAnide 1 milliGRAM(s) Oral two times a day  dextrose 40% Gel 15 Gram(s) Oral once  dextrose 5%. 1000 milliLiter(s) (50 mL/Hr) IV Continuous <Continuous>  dextrose 5%. 1000 milliLiter(s) (100 mL/Hr) IV Continuous <Continuous>  dextrose 50% Injectable 25 Gram(s) IV Push once  dextrose 50% Injectable 12.5 Gram(s) IV Push once  dextrose 50% Injectable 25 Gram(s) IV Push once  gabapentin 300 milliGRAM(s) Oral three times a day  glucagon  Injectable 1 milliGRAM(s) IntraMuscular once  heparin   Injectable 5000 Unit(s) SubCutaneous every 8 hours  hydrALAZINE 50 milliGRAM(s) Oral three times a day  insulin lispro (ADMELOG) corrective regimen sliding scale   SubCutaneous at bedtime  insulin lispro (ADMELOG) corrective regimen sliding scale   SubCutaneous three times a day before meals  insulin NPH human recombinant 35 Unit(s) SubCutaneous two times a day before meals  isosorbide   dinitrate Tablet (ISORDIL) 10 milliGRAM(s) Oral three times a day  metoprolol succinate  milliGRAM(s) Oral two times a day  multivitamin 1 Tablet(s) Oral daily      VITALS:  T(F): 98.1 (05-10-21 @ 11:24), Max: 98.2 (05-09-21 @ 21:54)  HR: 66 (05-10-21 @ 13:13)  BP: 152/96 (05-10-21 @ 13:13)  RR: 17 (05-10-21 @ 13:13)  SpO2: 96% (05-10-21 @ 13:13)  Wt(kg): --    05-09 @ 07:01  -  05-10 @ 07:00  --------------------------------------------------------  IN: 200 mL / OUT: 1000 mL / NET: -800 mL    05-10 @ 07:01  -  05-10 @ 15:30  --------------------------------------------------------  IN: 0 mL / OUT: 520 mL / NET: -520 mL      PHYSICAL EXAM:  Constitutional: NAD  HEENT: anicteric sclera  Neck: No JVD  Respiratory: CTAB, no wheezes, rales or rhonchi  Cardiovascular: S1, S2, RRR  Gastrointestinal: BS+, soft, NT/ND  Extremities: + peripheral edema  Neurological: A/O x 3, no focal deficits  Psychiatric: Normal mood, normal affect  : No paredes.     LABS:  05-10    130<L>  |  87<L>  |  143<H>  ----------------------------<  288<H>  4.8   |  26  |  4.79<H>    Ca    9.9      10 May 2021 08:27  Phos  5.8     05-10  Mg     2.5     05-10      Creatinine Trend: 4.79 <--, 4.80 <--, 4.17 <--, 3.80 <--, 3.96 <--, 3.99 <--, 4.30 <--                        9.9    5.59  )-----------( 182      ( 10 May 2021 08:27 )             30.3     Urine Studies:        RADIOLOGY & ADDITIONAL STUDIES:

## 2021-05-11 LAB
ANION GAP SERPL CALC-SCNC: 13 MMOL/L — SIGNIFICANT CHANGE UP (ref 7–14)
BUN SERPL-MCNC: 158 MG/DL — HIGH (ref 7–23)
CALCIUM SERPL-MCNC: 9.9 MG/DL — SIGNIFICANT CHANGE UP (ref 8.4–10.5)
CHLORIDE SERPL-SCNC: 88 MMOL/L — LOW (ref 98–107)
CO2 SERPL-SCNC: 29 MMOL/L — SIGNIFICANT CHANGE UP (ref 22–31)
CREAT SERPL-MCNC: 4.72 MG/DL — HIGH (ref 0.5–1.3)
GLUCOSE BLDC GLUCOMTR-MCNC: 191 MG/DL — HIGH (ref 70–99)
GLUCOSE BLDC GLUCOMTR-MCNC: 268 MG/DL — HIGH (ref 70–99)
GLUCOSE BLDC GLUCOMTR-MCNC: 306 MG/DL — HIGH (ref 70–99)
GLUCOSE BLDC GLUCOMTR-MCNC: 312 MG/DL — HIGH (ref 70–99)
GLUCOSE SERPL-MCNC: 316 MG/DL — HIGH (ref 70–99)
HCT VFR BLD CALC: 30.2 % — LOW (ref 34.5–45)
HGB BLD-MCNC: 10 G/DL — LOW (ref 11.5–15.5)
MAGNESIUM SERPL-MCNC: 2.5 MG/DL — SIGNIFICANT CHANGE UP (ref 1.6–2.6)
MCHC RBC-ENTMCNC: 28.2 PG — SIGNIFICANT CHANGE UP (ref 27–34)
MCHC RBC-ENTMCNC: 33.1 GM/DL — SIGNIFICANT CHANGE UP (ref 32–36)
MCV RBC AUTO: 85.1 FL — SIGNIFICANT CHANGE UP (ref 80–100)
NRBC # BLD: 0 /100 WBCS — SIGNIFICANT CHANGE UP
NRBC # FLD: 0 K/UL — SIGNIFICANT CHANGE UP
PHOSPHATE SERPL-MCNC: 5.4 MG/DL — HIGH (ref 2.5–4.5)
PLATELET # BLD AUTO: 182 K/UL — SIGNIFICANT CHANGE UP (ref 150–400)
POTASSIUM SERPL-MCNC: 4.6 MMOL/L — SIGNIFICANT CHANGE UP (ref 3.5–5.3)
POTASSIUM SERPL-SCNC: 4.6 MMOL/L — SIGNIFICANT CHANGE UP (ref 3.5–5.3)
RBC # BLD: 3.55 M/UL — LOW (ref 3.8–5.2)
RBC # FLD: 13.9 % — SIGNIFICANT CHANGE UP (ref 10.3–14.5)
SODIUM SERPL-SCNC: 130 MMOL/L — LOW (ref 135–145)
WBC # BLD: 5.32 K/UL — SIGNIFICANT CHANGE UP (ref 3.8–10.5)
WBC # FLD AUTO: 5.32 K/UL — SIGNIFICANT CHANGE UP (ref 3.8–10.5)

## 2021-05-11 PROCEDURE — 99232 SBSQ HOSP IP/OBS MODERATE 35: CPT

## 2021-05-11 PROCEDURE — 99233 SBSQ HOSP IP/OBS HIGH 50: CPT

## 2021-05-11 RX ORDER — INSULIN LISPRO 100/ML
20 VIAL (ML) SUBCUTANEOUS
Refills: 0 | Status: DISCONTINUED | OUTPATIENT
Start: 2021-05-11 | End: 2021-05-12

## 2021-05-11 RX ORDER — BUMETANIDE 0.25 MG/ML
2 INJECTION INTRAMUSCULAR; INTRAVENOUS
Refills: 0 | Status: DISCONTINUED | OUTPATIENT
Start: 2021-05-11 | End: 2021-05-20

## 2021-05-11 RX ORDER — INSULIN GLARGINE 100 [IU]/ML
55 INJECTION, SOLUTION SUBCUTANEOUS AT BEDTIME
Refills: 0 | Status: DISCONTINUED | OUTPATIENT
Start: 2021-05-11 | End: 2021-05-19

## 2021-05-11 RX ADMIN — Medication 50 MILLIGRAM(S): at 22:41

## 2021-05-11 RX ADMIN — Medication 100 MILLIGRAM(S): at 16:56

## 2021-05-11 RX ADMIN — Medication 15 UNIT(S): at 09:06

## 2021-05-11 RX ADMIN — Medication 2: at 22:42

## 2021-05-11 RX ADMIN — ATORVASTATIN CALCIUM 80 MILLIGRAM(S): 80 TABLET, FILM COATED ORAL at 22:41

## 2021-05-11 RX ADMIN — Medication 2: at 18:00

## 2021-05-11 RX ADMIN — Medication 100 MILLIGRAM(S): at 05:30

## 2021-05-11 RX ADMIN — Medication 20 UNIT(S): at 18:00

## 2021-05-11 RX ADMIN — Medication 8: at 12:53

## 2021-05-11 RX ADMIN — GABAPENTIN 300 MILLIGRAM(S): 400 CAPSULE ORAL at 05:30

## 2021-05-11 RX ADMIN — Medication 8: at 09:06

## 2021-05-11 RX ADMIN — Medication 81 MILLIGRAM(S): at 11:54

## 2021-05-11 RX ADMIN — ISOSORBIDE DINITRATE 10 MILLIGRAM(S): 5 TABLET ORAL at 21:02

## 2021-05-11 RX ADMIN — Medication 1 TABLET(S): at 11:54

## 2021-05-11 RX ADMIN — BUMETANIDE 1 MILLIGRAM(S): 0.25 INJECTION INTRAMUSCULAR; INTRAVENOUS at 05:30

## 2021-05-11 RX ADMIN — HEPARIN SODIUM 5000 UNIT(S): 5000 INJECTION INTRAVENOUS; SUBCUTANEOUS at 14:55

## 2021-05-11 RX ADMIN — BUMETANIDE 1 MILLIGRAM(S): 0.25 INJECTION INTRAMUSCULAR; INTRAVENOUS at 16:56

## 2021-05-11 RX ADMIN — Medication 50 MILLIGRAM(S): at 05:30

## 2021-05-11 RX ADMIN — HEPARIN SODIUM 5000 UNIT(S): 5000 INJECTION INTRAVENOUS; SUBCUTANEOUS at 22:42

## 2021-05-11 RX ADMIN — HEPARIN SODIUM 5000 UNIT(S): 5000 INJECTION INTRAVENOUS; SUBCUTANEOUS at 05:30

## 2021-05-11 RX ADMIN — ISOSORBIDE DINITRATE 10 MILLIGRAM(S): 5 TABLET ORAL at 05:30

## 2021-05-11 RX ADMIN — Medication 50 MILLIGRAM(S): at 16:21

## 2021-05-11 RX ADMIN — Medication 15 UNIT(S): at 12:53

## 2021-05-11 RX ADMIN — GABAPENTIN 300 MILLIGRAM(S): 400 CAPSULE ORAL at 14:55

## 2021-05-11 RX ADMIN — INSULIN GLARGINE 55 UNIT(S): 100 INJECTION, SOLUTION SUBCUTANEOUS at 22:44

## 2021-05-11 RX ADMIN — ISOSORBIDE DINITRATE 10 MILLIGRAM(S): 5 TABLET ORAL at 11:55

## 2021-05-11 RX ADMIN — GABAPENTIN 300 MILLIGRAM(S): 400 CAPSULE ORAL at 22:41

## 2021-05-11 RX ADMIN — Medication 100 MILLIGRAM(S): at 11:54

## 2021-05-11 NOTE — PROGRESS NOTE ADULT - SUBJECTIVE AND OBJECTIVE BOX
[facility] Division of Hospital Medicine  Chad BROWN ([place]) MD Michael  Pager [phone]    SUBJECTIVE:  Follow up for CHF.    The patient was seen and evaluated at bedside this AM. No o/n events. Denies any SOb/Cp/NV. Feels well.    ROS: All systems negative except as noted.      Vital Signs Last 24 Hrs  T(C): 36.7 (11 May 2021 14:46), Max: 36.8 (11 May 2021 05:37)  T(F): 98 (11 May 2021 14:46), Max: 98.2 (11 May 2021 05:37)  HR: 64 (11 May 2021 14:46) (63 - 74)  BP: 127/68 (11 May 2021 14:46) (127/68 - 154/75)  BP(mean): --  RR: 17 (11 May 2021 14:46) (16 - 18)  SpO2: 97% (11 May 2021 14:46) (97% - 100%)      PHYSICAL EXAM:  Gen- In bed, comfortable, NAD  Eyes- EOMI, PERRLA, nonicteric.  EMNT- Fair dentition. MMM. No tonsilar exudates. No posterior pharynx erythema.  Neck- Supple. No masses. No tracheal deviation.  Resp- CTAB, good effort. No r/r/w. No accessory muscle use.  CVS- RRR, S1S2, no g/r/m. Trace LE edema.  GI- Soft abd, NT, ND, +BSx4. No HSM.  MSK- No C/C. ROM intact. No crepitus.  Neuro- CN II-XII intact. Speech fluent/face symmetric. Sensation intact.  Skin- No rashes/ulcers. Warm/moist.  Psych- AAOx3. Appropriate mood/affect.        MEDICATION:  MEDICATIONS  (STANDING):  allopurinol 100 milliGRAM(s) Oral daily  aspirin enteric coated 81 milliGRAM(s) Oral daily  atorvastatin 80 milliGRAM(s) Oral at bedtime  buMETAnide 1 milliGRAM(s) Oral two times a day  dextrose 40% Gel 15 Gram(s) Oral once  dextrose 5%. 1000 milliLiter(s) (50 mL/Hr) IV Continuous <Continuous>  dextrose 5%. 1000 milliLiter(s) (100 mL/Hr) IV Continuous <Continuous>  dextrose 50% Injectable 25 Gram(s) IV Push once  dextrose 50% Injectable 12.5 Gram(s) IV Push once  dextrose 50% Injectable 25 Gram(s) IV Push once  gabapentin 300 milliGRAM(s) Oral three times a day  glucagon  Injectable 1 milliGRAM(s) IntraMuscular once  heparin   Injectable 5000 Unit(s) SubCutaneous every 8 hours  hydrALAZINE 50 milliGRAM(s) Oral three times a day  insulin glargine Injectable (LANTUS) 55 Unit(s) SubCutaneous at bedtime  insulin lispro (ADMELOG) corrective regimen sliding scale   SubCutaneous three times a day before meals  insulin lispro (ADMELOG) corrective regimen sliding scale   SubCutaneous at bedtime  insulin lispro Injectable (ADMELOG) 20 Unit(s) SubCutaneous three times a day before meals  isosorbide   dinitrate Tablet (ISORDIL) 10 milliGRAM(s) Oral three times a day  metoprolol succinate  milliGRAM(s) Oral two times a day  multivitamin 1 Tablet(s) Oral daily    MEDICATIONS  (PRN):  acetaminophen   Tablet .. 650 milliGRAM(s) Oral every 6 hours PRN Temp greater or equal to 38C (100.4F), Mild Pain (1 - 3), Moderate Pain (4 - 6)  ketotifen 0.025% Ophthalmic Solution 1 Drop(s) Both EYES two times a day PRN Allergic Conjunctivitis            LABORATORY:                          10.0   5.32  )-----------( 182      ( 11 May 2021 07:51 )             30.2     05-11    130<L>  |  88<L>  |  158<H>  ----------------------------<  316<H>  4.6   |  29  |  4.72<H>    Ca    9.9      11 May 2021 07:51  Phos  5.4     05-11  Mg     2.5     05-11                COVID-19 PCR: Cedric (29 Apr 2021 10:24)

## 2021-05-11 NOTE — PROGRESS NOTE ADULT - PROBLEM SELECTOR PLAN 1
- Acute on chronic HFpEF.  - s/p RHC 5/4 c/w severe pulm HTN  - TTE normal LVEF  - S/p metolazone x2. IV Bumex switched to PO 1mg BID  -continue hydralazine 50 tid, isordil 10 tid, Toprol 100 mg po BID.   - 1.5L fluid restriction, daily weights, I/O   - F/U heart failure team recs.

## 2021-05-11 NOTE — PROGRESS NOTE ADULT - SUBJECTIVE AND OBJECTIVE BOX
Nephrology Followup Note - [phone] - Dr Thompson / Dr Murdock / Dr Morocho / Dr Kendrick / Dr Magallanes / Dr Alfaro / Dr Escudero / Dr Raymundo  Pt seen and examined at bedside  No acute events overnight. No complaints.     Allergies:  No Known Allergies    Hospital Medications:   MEDICATIONS  (STANDING):  allopurinol 100 milliGRAM(s) Oral daily  aspirin enteric coated 81 milliGRAM(s) Oral daily  atorvastatin 80 milliGRAM(s) Oral at bedtime  buMETAnide 1 milliGRAM(s) Oral two times a day  dextrose 40% Gel 15 Gram(s) Oral once  dextrose 5%. 1000 milliLiter(s) (50 mL/Hr) IV Continuous <Continuous>  dextrose 5%. 1000 milliLiter(s) (100 mL/Hr) IV Continuous <Continuous>  dextrose 50% Injectable 25 Gram(s) IV Push once  dextrose 50% Injectable 12.5 Gram(s) IV Push once  dextrose 50% Injectable 25 Gram(s) IV Push once  gabapentin 300 milliGRAM(s) Oral three times a day  glucagon  Injectable 1 milliGRAM(s) IntraMuscular once  heparin   Injectable 5000 Unit(s) SubCutaneous every 8 hours  hydrALAZINE 50 milliGRAM(s) Oral three times a day  insulin glargine Injectable (LANTUS) 55 Unit(s) SubCutaneous at bedtime  insulin lispro (ADMELOG) corrective regimen sliding scale   SubCutaneous three times a day before meals  insulin lispro (ADMELOG) corrective regimen sliding scale   SubCutaneous at bedtime  insulin lispro Injectable (ADMELOG) 20 Unit(s) SubCutaneous three times a day before meals  isosorbide   dinitrate Tablet (ISORDIL) 10 milliGRAM(s) Oral three times a day  metoprolol succinate  milliGRAM(s) Oral two times a day  multivitamin 1 Tablet(s) Oral daily    VITALS:  T(F): 98 (05-11-21 @ 14:46), Max: 98.2 (05-11-21 @ 05:37)  HR: 64 (05-11-21 @ 14:46)  BP: 127/68 (05-11-21 @ 14:46)  RR: 17 (05-11-21 @ 14:46)  SpO2: 97% (05-11-21 @ 14:46)  Wt(kg): --    05-10 @ 07:01  -  05-11 @ 07:00  --------------------------------------------------------  IN: 1250 mL / OUT: 1620 mL / NET: -370 mL        PHYSICAL EXAM:  Constitutional: NAD  HEENT: anicteric sclera, oropharynx clear, MMM  Neck: No JVD  Respiratory: CTAB, no wheezes, rales or rhonchi  Cardiovascular: S1, S2, RRR  Gastrointestinal: BS+, soft, NT/ND  Extremities: No cyanosis or clubbing. No peripheral edema  Neurological: A/O x 3, no focal deficits  Psychiatric: Normal mood, normal affect  : No CVA tenderness. No paredes.   Skin: No rashes    LABS:  05-11    130<L>  |  88<L>  |  158<H>  ----------------------------<  316<H>  4.6   |  29  |  4.72<H>    Ca    9.9      11 May 2021 07:51  Phos  5.4     05-11  Mg     2.5     05-11      Creatinine Trend: 4.72 <--, 4.79 <--, 4.80 <--, 4.17 <--, 3.80 <--, 3.96 <--, 3.99 <--                        10.0   5.32  )-----------( 182      ( 11 May 2021 07:51 )             30.2     Urine Studies:

## 2021-05-11 NOTE — SBIRT NOTE ADULT - NSSBIRTALCPOSREINDET_GEN_A_CORE
Provided SBIRT services: Full screen Positive. Positive reinforcement provided given patient currently within healthy guidelines. Education materials reviewed

## 2021-05-11 NOTE — PROGRESS NOTE ADULT - SUBJECTIVE AND OBJECTIVE BOX
Subjective: Patient sitting OOB to malou chair. States she can lye flat in bed without SOB. Denies chest pain, palpitations and orthopnea.     Medications:  acetaminophen   Tablet .. 650 milliGRAM(s) Oral every 6 hours PRN  allopurinol 100 milliGRAM(s) Oral daily  aspirin enteric coated 81 milliGRAM(s) Oral daily  atorvastatin 80 milliGRAM(s) Oral at bedtime  buMETAnide 1 milliGRAM(s) Oral two times a day  dextrose 40% Gel 15 Gram(s) Oral once  dextrose 5%. 1000 milliLiter(s) IV Continuous <Continuous>  dextrose 5%. 1000 milliLiter(s) IV Continuous <Continuous>  dextrose 50% Injectable 25 Gram(s) IV Push once  dextrose 50% Injectable 12.5 Gram(s) IV Push once  dextrose 50% Injectable 25 Gram(s) IV Push once  gabapentin 300 milliGRAM(s) Oral three times a day  glucagon  Injectable 1 milliGRAM(s) IntraMuscular once  heparin   Injectable 5000 Unit(s) SubCutaneous every 8 hours  hydrALAZINE 50 milliGRAM(s) Oral three times a day  insulin glargine Injectable (LANTUS) 45 Unit(s) SubCutaneous at bedtime  insulin lispro (ADMELOG) corrective regimen sliding scale   SubCutaneous three times a day before meals  insulin lispro (ADMELOG) corrective regimen sliding scale   SubCutaneous at bedtime  insulin lispro Injectable (ADMELOG) 15 Unit(s) SubCutaneous three times a day before meals  isosorbide   dinitrate Tablet (ISORDIL) 10 milliGRAM(s) Oral three times a day  ketotifen 0.025% Ophthalmic Solution 1 Drop(s) Both EYES two times a day PRN  metoprolol succinate  milliGRAM(s) Oral two times a day  multivitamin 1 Tablet(s) Oral daily      Physical Exam:    Vitals:  Vital Signs Last 24 Hrs  T(C): 36.6 (11 May 2021 11:51), Max: 36.8 (11 May 2021 05:37)  T(F): 97.9 (11 May 2021 11:51), Max: 98.2 (11 May 2021 05:37)  HR: 63 (11 May 2021 11:51) (63 - 74)  BP: 154/75 (11 May 2021 11:51) (133/64 - 154/75)  BP(mean): --  RR: 17 (11 May 2021 11:51) (16 - 18)  SpO2: 98% (11 May 2021 11:51) (97% - 100%)    Daily     Daily Weight in k.7 (11 May 2021 05:37)    I&O's Summary    10 May 2021 07:01  -  11 May 2021 07:00  --------------------------------------------------------  IN: 1250 mL / OUT: 1620 mL / NET: -370 mL        Tele: NSR 60's    General: No distress. Comfortable.  HEENT: normocephalic  Neck: Neck supple. JVP 8-10 cm  Chest: Clear to auscultation bilaterally  CV: RRR, Normal S1 and S2. No murmurs, rub, or gallops. Radial pulses normal. 1-2+ LE edema  Abdomen: Soft, non-distended, non-tender  Skin: No rashes or skin breakdown  Neurology: Alert and oriented times three.   Psych: Affect normal    Labs:                        10.0   5.32  )-----------( 182      ( 11 May 2021 07:51 )             30.2     05-11    130<L>  |  88<L>  |  158<H>  ----------------------------<  316<H>  4.6   |  29  |  4.72<H>    Ca    9.9      11 May 2021 07:51  Phos  5.4     05-11  Mg     2.5     05-11      < from: Cardiac Cath Lab - Adult (21 @ 12:28) >  WOJCIECH Lay M.D.  INDICATIONS: Heart failure; unspecified.  HISTORY: Anemia. Peripheral neuropathy. Benign thyroid nodule. History  includes peripheral vascular disease. The patient has hypertension,  insulin-controlled diabetes, renal failure (not requiring dialysis),  medication-treated dyslipidemia, and obesity.  PRIOR DIAGNOSTIC TEST RESULTS: Echocardiography (transthoracic) performed (  2021): 66 % estimated ejection fraction.  PROCEDURE:  --  Sonosite - Diagnostic.  --  Right heart catheterization.  TECHNIQUE: The risks and alternatives of the procedures and conscious  sedation were explained to the patient and informed consent was obtained.  Cardiac catheterization performed electively.  Sonosite - Diagnostic. Right brachial vein was identified and access was  obtained using ultrasound guidance. Right brachial vein access. Local  anesthetic given. The puncture site was infiltrated with 2 % lidocaine. A  5 Fr. Radial Glidesheath was inserted in the vessel. Right heart  catheterization. The procedure was performed utilizing a 5 Fr. X 110cm Bln  Wedge Pressure Cath catheter. RADIATION EXPOSURE: 1.9 min.  MEDICATIONS GIVEN: 2% Lidocaine, 3 ml, subcutaneously. 0.9% Normal saline,  10 ml, IV.  HEMODYNAMICS: There is severe pulmonary hypertension.  COMPLICATIONS: There were no complications.  DIAGNOSTIC RECOMMENDATIONS: Medical management is recommended.  Prepared and signed by  Ag Garza M.D.  Signed 2021 15:57:03  HEMODYNAMIC TABLES  Pressures:  Baseline  Pressures:  - HR: 67  Pressures:  - Rhythm:  Pressures:  -- Aortic Pressure (S/D/M): 163/78/107  Pressures:  -- Pulmonary Artery (S/D/M): 69/21/44  Pressures:  -- Pulmonary Capillary Wedge: 39/33/28  Pressures:  -- Right Atrium (a/v/M): 26/23/20  Pressures:  -- Right Ventricle (s/edp): 68/26/--  O2 Sats:  Baseline  O2 Sats:  - HR: 67  O2 Sats:  - Rhythm:  O2 Sats:  -- AO: 9.5/98/12.66  O2 Sats:  -- PA: 9.5/74.4/9.61  Outputs:  Baseline  Outputs:  -- CALCULATIONS[age]  Outputs:  -- CALCULATIONS: Body Surface Area: 2.05  Outputs:  -- CALCULATIONS: Height in cm: 158.00  Outputs:  -- CALCULATIONS: Sex: Female  Outputs:  -- CALCULATIONS: Weight in k.70  Outputs:  -- OUTPUTS: CO by Devante: 8.42  Outputs:  -- OUTPUTS: Devante cardiac index: 4.10  Outputs:  -- OUTPUTS: O2 consumption: 256.61  Outputs:  -- OUTPUTS: Vo2 Indexed: 125.00  Outputs:  -- RESISTANCES: Left ventricular stroke work: 134.96  Outputs:  -- RESISTANCES: Left Ventricular Stroke Work index: 65.74  Outputs:  -- RESISTANCES: Pulmonary vascular index (dsc): 312.15  Outputs:  -- RESISTANCES: Pulmonary vascular index (Wood Units): 3.90  Outputs:  -- RESISTANCES: Pulmonary vascular resistance (dsc): 152.06  Outputs:  -- RESISTANCES: Pulmonary vascular resistance (Wood Units): 1.90  Outputs:  -- RESISTANCES: PVR_SVR Ratio: 0.18  Outputs:  -- RESISTANCES: Right ventricular stroke work: 41.00  Outputs:-- RESISTANCES: Right ventricular stroke work index: 19.97  Outputs:  -- RESISTANCES: Systemic vascular index (dsc): 1697.33  Outputs:  -- RESISTANCES: Systemic vascular index (Wood Units): 21.22  Outputs:  -- RESISTANCES: Systemic vascular resistance (dsc): 826.80  Outputs:  -- RESISTANCES: Systemic vascular resistance (Wood Units): 10.34  Outputs:  -- RESISTANCES: Total pulmonary index (dsc): 858.42  Outputs:  -- RESISTANCES: Total pulmonary index (Wood Units): 10.73  Outputs:  -- RESISTANCES: Total pulmonary resistance (dsc): 418.15  Outputs:  -- RESISTANCES: Total pulmonary resistance (Wood Units): 5.23  Outputs:  -- RESISTANCES: Total vascular index (Wood Units): 26.10  Outputs:  -- RESISTANCES: Total vascular resistance (dsc): 1016.87  Outputs:  -- RESISTANCES: Total vascular resistance (Wood Units): 12.71  Outputs:  -- RESISTANCES: Total vascular resistance index (dsc): 2087.52  Outputs:  -- RESISTANCES: TPR_TVR Ratio: 0.41  Outputs:  -- SHUNTS: Pulmonary flow: 8.42  Outputs:  -- SHUNTS: Qp Indexed: 4.10  Outputs:  -- SHUNTS: Qs Indexed: 4.10  Outputs:  -- SHUNTS: Systemi    < from: Transthoracic Echocardiogram (21 @ 14:49) >  DIMENSIONS:  Dimensions:     Normal Values:  LA:     3.6 cm    2.0 - 4.0 cm  Ao:     3.0 cm    2.0 - 3.8 cm  SEPTUM: 0.8 cm    0.6 - 1.2 cm  PWT:    0.8 cm    0.6 - 1.1 cm  LVIDd:  4.4 cm    3.0 - 5.6 cm  LVIDs:  2.8 cm    1.8 - 4.0 cm  Derived Variables:  LVMI: 53 g/m2  RWT: 0.36  Fractional short: 36 %  Ejection Fraction (Teicholtz): 66 %  ------------------------------------------------------------------------  OBSERVATIONS:  Mitral Valve: Mitral annular calcification, otherwise  normal mitral valve. Minimal mitral regurgitation.  Aortic Root: Normal aortic root.  Aortic Valve: Aortic valve leaflet morphology not well  visualized. Peak transaortic valve gradient equals 30 mm  Hg, mean transaortic valve gradient equals 18 mm Hg,  estimated aortic valve area equals 1.6 sqcm (by continuity  equation), consistent with mild aortic stenosis.  Left Atrium: Normal left atrium.  LA volume index = 26  cc/m2.  Left Ventricle: Endocardium not well visualized; grossly  normal left ventricular systolic function. Normal left  ventricular internal dimensions and wall thicknesses. Mild  diastolic dysfunction (Stage I).  Right Heart: Normal right atrium. The right ventricle is  not well visualized; grossly normal right ventricular  systolic function. Normal tricuspid valve. Minimal  tricuspid regurgitation. Normal pulmonic valve. Minimal  pulmonic regurgitation.  Pericardium/PleuraNormal pericardium with no pericardial  effusion.  ------------------------------------------------------------------------  CONCLUSIONS:  1. Mitral annular calcification, otherwise normal mitral  valve. Minimal mitral regurgitation.  2. Aortic valve leafletmorphology not well visualized.  Peak transaortic valve gradient equals 30 mm Hg, mean  transaortic valve gradient equals 18 mm Hg, estimated  aortic valve area equals 1.6 sqcm (by continuity equation),  consistent with mild aortic stenosis.  3. Normal left ventricular internal dimensions and wall  thicknesses.  4. Endocardium not well visualized; grossly normal left  ventricular systolic function.  5. Mild diastolic dysfunction (Stage I).  6. The right ventricle is not well visualized; grossly  normal right ventricular systolic function.  -----------------------------------------------------------------    < end of copied text >

## 2021-05-11 NOTE — PROGRESS NOTE ADULT - PROBLEM SELECTOR PLAN 1
-Symptomatically improving. BUN/Cr 158/4.72 today ( baseline creat from earlier this year 2)  -Diuresed 1620 in 24 hrs, net neg 370 ml. Wt is 224.2 lbs today and 222 lbs 5/10, down from 232 on admission.   -Bumex gtt has been discontinued and started on Bumex 1 mg po BID.   - continue hydralazine 50 tid with isordil 10 tid  -Continue Toprol 100 mg po BID.   -Keep k 4.0-5.0 and mag 2.0.   -Daily standing weights and strict I/O.  -Further recommendations to follow  -as per nephrology note, it no improvement in renal function, will need HD

## 2021-05-11 NOTE — PROGRESS NOTE ADULT - PROBLEM SELECTOR PLAN 6
-FS still suboptimally controlled.  -Appreciate endo recs.  -Current on basal/bolus w/ ISS. Titrate as per endo.

## 2021-05-11 NOTE — CONSULT NOTE ADULT - SUBJECTIVE AND OBJECTIVE BOX
Interventional Radiology    HPI: 71y Female with DM2, CKD3 now requiring HD, team discussed with nephrology, not yet anticipating requiring tunneled HD access.     Allergies:   Medications (Abx/Cardiac/Anticoagulation/Blood Products)    aspirin enteric coated: 81 milliGRAM(s) Oral (05-11 @ 11:54)  buMETAnide: 1 milliGRAM(s) Oral (05-11 @ 05:30)  heparin   Injectable: 5000 Unit(s) SubCutaneous (05-11 @ 14:55)  hydrALAZINE: 50 milliGRAM(s) Oral (05-11 @ 16:21)  isosorbide   dinitrate Tablet (ISORDIL): 10 milliGRAM(s) Oral (05-11 @ 11:55)  metoprolol succinate ER: 100 milliGRAM(s) Oral (05-11 @ 05:30)    Data:    T(C): 36.7  HR: 67  BP: 131/67  RR: 16  SpO2: 99%    -WBC 5.32 / HgB 10.0 / Hct 30.2 / Plt 182  -Na 130 / Cl 88 /  / Glucose 316  -K 4.6 / CO2 29 / Cr 4.72  -ALT -- / Alk Phos -- / T.Bili --    Radiology:     Assessment/Plan:   71y Female with DM2, CKD3 now requiring HD, team discussed with nephrology, not yet anticipating requiring tunneled HD access.   plan for  above procedure Thursday. can put order for IR procedure under Dr. Joshi. preIR checklist in chart.   please recheck CBC BMP Coags 4AM

## 2021-05-11 NOTE — PROGRESS NOTE ADULT - PROBLEM SELECTOR PLAN 8
DVT ppx - Heparin 5,000u sc tid    Dispo- pending further improvement of renal function - pending start of HD.  PT eval - anticipating home.

## 2021-05-11 NOTE — PROGRESS NOTE ADULT - PROBLEM SELECTOR PLAN 2
-Cr not improved. As per nephro needs HD. IR juvencio to be placed.  -Diuresis as above.  -F/u nephrology. Avoid nephrotoxins.  -Trend labs.

## 2021-05-11 NOTE — PROGRESS NOTE ADULT - ASSESSMENT
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 Endocrine consulted for uncontrolled DM2 with hyperglycemia inpatient.    Problem/Recommendation - 1:  Problem: Type 2 diabetes mellitus with hyperglycemia, with long-term current use of insulin. Recommendation: T2DM with hgb a1c of 6.8% with FS tightly controlled at home but on unconventional regimen of BID NPH and regular insulin, states it may have been due to cost.   -Inpatient BG target 100-180 mg/dl, remain high today in 300s  -Increase Lantus to 55 units qhs   -Increase Admelog to 20 units tidac   -Continue Admelog moderate dose correction scale tidac and moderate qhs scale    Discharge   B/B insulin as per insurance if cost of pen is reasonable, if not then can use novolin 70/30 pen or vial, finalize before discharge.  Can send scripts for basal and bolus insulin pens to assess for cost.    Would benefit from Endocrinologist f/u   Dr. Lino   [phone]  [address] [facility] [address] [facility], [place] [address].    Problem/Recommendation - 2:  ·  Problem: Other hyperlipidemia.  Recommendation: LDL 72 on lipitor.     Problem/Recommendation - 3:  ·  Problem: Essential hypertension.  Recommendation: Not at goal of ,130/80 on toprol, isosorbide, hydralazine, metoprolol, bumex    Problem/Recommendation - 4:  ·  Problem: Thyroid nodule.  Recommendation: RLP hypoechoic nodule 13mm and LLP isoechoic nodule 1.9 cm. Both benign according to pt in the past but would need records. Can follow up outpatient.    Frances Lynch MD  Division of Endocrinology  Pager: [phone]    If after 6PM or before 9AM, or on weekends/holidays, please call endocrine answering service for assistance ([phone]).  For nonurgent matters email [email or web address] for assistance.

## 2021-05-11 NOTE — PROGRESS NOTE ADULT - PROBLEM SELECTOR PLAN 1
Pt with CKD3, follows with Dr Escudero in outpt clinic.   Baseline Cr ~2 from earlier this year, and uptrend to 4.8 during this hospitalization.   No  obstruction on renal US. No significant proteinuria on UA. BP stable, no obvious nephrotoxin.   Non-oliguric, and responsive to diuretics.   worsening renal function, and BUN high  Discussed with pt regarding worsening renal function, and reviewed risk and benefits of dialysis.   Given high BUN, and not signs of improving cr, would favor starting HD at this time.  Pt agreeable. IR consult for shiley cath placement.   no urgent indication for dialysis at this time, will likely start by tomorrow, after cath placement.   monitor daily BMP, strict I/O.   dose all meds for eGFR<15ml/min.   avoid ACEi/ARB/NSAIDs/Nephrotoxics.

## 2021-05-11 NOTE — PROGRESS NOTE ADULT - SUBJECTIVE AND OBJECTIVE BOX
Chief Complaint: DM2    History: tolerating po, no outside food  no hypoglycemia events  BG remains in 300s today    MEDICATIONS  (STANDING):  allopurinol 100 milliGRAM(s) Oral daily  aspirin enteric coated 81 milliGRAM(s) Oral daily  atorvastatin 80 milliGRAM(s) Oral at bedtime  buMETAnide 1 milliGRAM(s) Oral two times a day  dextrose 40% Gel 15 Gram(s) Oral once  dextrose 5%. 1000 milliLiter(s) (50 mL/Hr) IV Continuous <Continuous>  dextrose 5%. 1000 milliLiter(s) (100 mL/Hr) IV Continuous <Continuous>  dextrose 50% Injectable 25 Gram(s) IV Push once  dextrose 50% Injectable 12.5 Gram(s) IV Push once  dextrose 50% Injectable 25 Gram(s) IV Push once  gabapentin 300 milliGRAM(s) Oral three times a day  glucagon  Injectable 1 milliGRAM(s) IntraMuscular once  heparin   Injectable 5000 Unit(s) SubCutaneous every 8 hours  hydrALAZINE 50 milliGRAM(s) Oral three times a day  insulin glargine Injectable (LANTUS) 45 Unit(s) SubCutaneous at bedtime  insulin lispro (ADMELOG) corrective regimen sliding scale   SubCutaneous three times a day before meals  insulin lispro (ADMELOG) corrective regimen sliding scale   SubCutaneous at bedtime  insulin lispro Injectable (ADMELOG) 15 Unit(s) SubCutaneous three times a day before meals  isosorbide   dinitrate Tablet (ISORDIL) 10 milliGRAM(s) Oral three times a day  metoprolol succinate  milliGRAM(s) Oral two times a day  multivitamin 1 Tablet(s) Oral daily    MEDICATIONS  (PRN):  acetaminophen   Tablet .. 650 milliGRAM(s) Oral every 6 hours PRN Temp greater or equal to 38C (100.4F), Mild Pain (1 - 3), Moderate Pain (4 - 6)  ketotifen 0.025% Ophthalmic Solution 1 Drop(s) Both EYES two times a day PRN Allergic Conjunctivitis      Allergies    No Known Allergies    Intolerances      Review of Systems:  ALL OTHER SYSTEMS REVIEWED AND NEGATIVE      PHYSICAL EXAM:  VITALS: T(C): 36.6 (05-11-21 @ 11:51)  T(F): 97.9 (05-11-21 @ 11:51), Max: 98.2 (05-11-21 @ 05:37)  HR: 63 (05-11-21 @ 11:51) (63 - 74)  BP: 154/75 (05-11-21 @ 11:51) (133/64 - 154/75)  RR:  (16 - 18)  SpO2:  (97% - 100%)  Wt(kg): --  GENERAL: NAD, well-groomed, well-developed  EYES: No proptosis, no lid lag, anicteric  HEENT:  Atraumatic, Normocephalic, moist mucous membranes  RESPIRATORY: nonlabored respirations  PSYCH: Alert and oriented x 3, normal affect, normal mood    CAPILLARY BLOOD GLUCOSE      POCT Blood Glucose.: 306 mg/dL (11 May 2021 12:38)  POCT Blood Glucose.: 312 mg/dL (11 May 2021 08:29)  POCT Blood Glucose.: 383 mg/dL (10 May 2021 21:12)  POCT Blood Glucose.: 378 mg/dL (10 May 2021 17:28)  POCT Blood Glucose.: 378 mg/dL (10 May 2021 17:28)      05-11    130<L>  |  88<L>  |  158<H>  ----------------------------<  316<H>  4.6   |  29  |  4.72<H>    EGFR if : 10<L>  EGFR if non : 9<L>    Ca    9.9      05-11  Mg     2.5     05-11  Phos  5.4     05-11        A1C with Estimated Average Glucose Result: 6.8 % (04-30-21 @ 07:41)      Thyroid Function Tests:  04-30 @ 07:41 TSH 2.19 FreeT4 -- T3 -- Anti TPO -- Anti Thyroglobulin Ab -- TSI --

## 2021-05-12 LAB
ANION GAP SERPL CALC-SCNC: 14 MMOL/L — SIGNIFICANT CHANGE UP (ref 7–14)
BUN SERPL-MCNC: 160 MG/DL — HIGH (ref 7–23)
CALCIUM SERPL-MCNC: 10.3 MG/DL — SIGNIFICANT CHANGE UP (ref 8.4–10.5)
CHLORIDE SERPL-SCNC: 91 MMOL/L — LOW (ref 98–107)
CO2 SERPL-SCNC: 29 MMOL/L — SIGNIFICANT CHANGE UP (ref 22–31)
CREAT SERPL-MCNC: 4.94 MG/DL — HIGH (ref 0.5–1.3)
GLUCOSE BLDC GLUCOMTR-MCNC: 121 MG/DL — HIGH (ref 70–99)
GLUCOSE BLDC GLUCOMTR-MCNC: 126 MG/DL — HIGH (ref 70–99)
GLUCOSE BLDC GLUCOMTR-MCNC: 130 MG/DL — HIGH (ref 70–99)
GLUCOSE BLDC GLUCOMTR-MCNC: 208 MG/DL — HIGH (ref 70–99)
GLUCOSE BLDC GLUCOMTR-MCNC: 78 MG/DL — SIGNIFICANT CHANGE UP (ref 70–99)
GLUCOSE SERPL-MCNC: 132 MG/DL — HIGH (ref 70–99)
HCT VFR BLD CALC: 30.2 % — LOW (ref 34.5–45)
HGB BLD-MCNC: 9.8 G/DL — LOW (ref 11.5–15.5)
MAGNESIUM SERPL-MCNC: 2.8 MG/DL — HIGH (ref 1.6–2.6)
MCHC RBC-ENTMCNC: 28.3 PG — SIGNIFICANT CHANGE UP (ref 27–34)
MCHC RBC-ENTMCNC: 32.5 GM/DL — SIGNIFICANT CHANGE UP (ref 32–36)
MCV RBC AUTO: 87.3 FL — SIGNIFICANT CHANGE UP (ref 80–100)
NRBC # BLD: 0 /100 WBCS — SIGNIFICANT CHANGE UP
NRBC # FLD: 0 K/UL — SIGNIFICANT CHANGE UP
PHOSPHATE SERPL-MCNC: 4.9 MG/DL — HIGH (ref 2.5–4.5)
PLATELET # BLD AUTO: 178 K/UL — SIGNIFICANT CHANGE UP (ref 150–400)
POTASSIUM SERPL-MCNC: 4.4 MMOL/L — SIGNIFICANT CHANGE UP (ref 3.5–5.3)
POTASSIUM SERPL-SCNC: 4.4 MMOL/L — SIGNIFICANT CHANGE UP (ref 3.5–5.3)
RBC # BLD: 3.46 M/UL — LOW (ref 3.8–5.2)
RBC # FLD: 13.9 % — SIGNIFICANT CHANGE UP (ref 10.3–14.5)
SODIUM SERPL-SCNC: 134 MMOL/L — LOW (ref 135–145)
WBC # BLD: 5.45 K/UL — SIGNIFICANT CHANGE UP (ref 3.8–10.5)
WBC # FLD AUTO: 5.45 K/UL — SIGNIFICANT CHANGE UP (ref 3.8–10.5)

## 2021-05-12 PROCEDURE — 99232 SBSQ HOSP IP/OBS MODERATE 35: CPT

## 2021-05-12 PROCEDURE — 99233 SBSQ HOSP IP/OBS HIGH 50: CPT

## 2021-05-12 RX ORDER — INSULIN LISPRO 100/ML
22 VIAL (ML) SUBCUTANEOUS
Refills: 0 | Status: DISCONTINUED | OUTPATIENT
Start: 2021-05-12 | End: 2021-05-13

## 2021-05-12 RX ADMIN — GABAPENTIN 300 MILLIGRAM(S): 400 CAPSULE ORAL at 15:20

## 2021-05-12 RX ADMIN — Medication 50 MILLIGRAM(S): at 23:00

## 2021-05-12 RX ADMIN — ISOSORBIDE DINITRATE 10 MILLIGRAM(S): 5 TABLET ORAL at 23:00

## 2021-05-12 RX ADMIN — HEPARIN SODIUM 5000 UNIT(S): 5000 INJECTION INTRAVENOUS; SUBCUTANEOUS at 15:20

## 2021-05-12 RX ADMIN — Medication 81 MILLIGRAM(S): at 12:59

## 2021-05-12 RX ADMIN — Medication 50 MILLIGRAM(S): at 15:20

## 2021-05-12 RX ADMIN — Medication 20 UNIT(S): at 09:07

## 2021-05-12 RX ADMIN — GABAPENTIN 300 MILLIGRAM(S): 400 CAPSULE ORAL at 23:00

## 2021-05-12 RX ADMIN — GABAPENTIN 300 MILLIGRAM(S): 400 CAPSULE ORAL at 06:17

## 2021-05-12 RX ADMIN — Medication 22 UNIT(S): at 16:58

## 2021-05-12 RX ADMIN — INSULIN GLARGINE 55 UNIT(S): 100 INJECTION, SOLUTION SUBCUTANEOUS at 22:59

## 2021-05-12 RX ADMIN — Medication 4: at 12:59

## 2021-05-12 RX ADMIN — ATORVASTATIN CALCIUM 80 MILLIGRAM(S): 80 TABLET, FILM COATED ORAL at 23:00

## 2021-05-12 RX ADMIN — Medication 20 UNIT(S): at 12:59

## 2021-05-12 RX ADMIN — ISOSORBIDE DINITRATE 10 MILLIGRAM(S): 5 TABLET ORAL at 06:17

## 2021-05-12 RX ADMIN — BUMETANIDE 2 MILLIGRAM(S): 0.25 INJECTION INTRAMUSCULAR; INTRAVENOUS at 17:58

## 2021-05-12 RX ADMIN — Medication 1 TABLET(S): at 12:59

## 2021-05-12 RX ADMIN — Medication 100 MILLIGRAM(S): at 12:59

## 2021-05-12 RX ADMIN — Medication 50 MILLIGRAM(S): at 06:17

## 2021-05-12 RX ADMIN — HEPARIN SODIUM 5000 UNIT(S): 5000 INJECTION INTRAVENOUS; SUBCUTANEOUS at 23:00

## 2021-05-12 RX ADMIN — ISOSORBIDE DINITRATE 10 MILLIGRAM(S): 5 TABLET ORAL at 15:20

## 2021-05-12 RX ADMIN — HEPARIN SODIUM 5000 UNIT(S): 5000 INJECTION INTRAVENOUS; SUBCUTANEOUS at 06:17

## 2021-05-12 RX ADMIN — Medication 100 MILLIGRAM(S): at 06:17

## 2021-05-12 RX ADMIN — BUMETANIDE 2 MILLIGRAM(S): 0.25 INJECTION INTRAMUSCULAR; INTRAVENOUS at 06:18

## 2021-05-12 RX ADMIN — Medication 100 MILLIGRAM(S): at 17:56

## 2021-05-12 NOTE — PROGRESS NOTE ADULT - PROBLEM SELECTOR PLAN 1
-Renal function labs trending up BUN/Cr 160/4.94. She is agreeable to HD to start tomorrow?  -Diuresed 700 ml, no input recorded in 24 hrs. Wt is 224.6 lbs, no change from yesterday.  -Continue Bumex 2 IV BID, started today.   - continue hydralazine 50 tid with isordil 10 tid  -Continue Toprol 100 mg po BID.   -Keep k 4.0-5.0 and mag 2.0.   -Daily standing weights and strict I/O. -Renal function labs trending up BUN/Cr 160/4.94. She is agreeable to HD to start tomorrow.  -Diuresed 700 ml, no input recorded in 24 hrs. Wt is 224.6 lbs, no change from yesterday.  -Continue Bumex 2 IV BID, started today.   - continue hydralazine 50 tid with isordil 10 tid  -Continue Toprol 100 mg po BID.   -Keep k 4.0-5.0 and mag 2.0.   -Daily standing weights and strict I/O.

## 2021-05-12 NOTE — PROGRESS NOTE ADULT - PROBLEM SELECTOR PLAN 2
Patient: Devon Neely Date: 2018   : 1942 Attending: Perry Thomason MD   76 year old male        Chief Complaint: abdominal pain    Subjective: reports abdominal pain is better today, answers \"I don't care\" for most questions, wants to go home   : when coming to talk began actively vomiting(Dr. Brannon of nephro also in room). Has some lower abdominal discomfort.    : no c/o now.   : no c/o but receiving dialysis.   : c/o abdominal pain to dialysis RN however had stated he pooped. Also c/o CP when asked but doesn't give details.   : no c/o     Pertinent Reviewed: Allergies, Medical History, Surgical History, Social History and Medications    Vital 24 Hour Range Most Recent Value   Temperature Temp  Min: 97.9 °F (36.6 °C)  Max: 98.9 °F (37.2 °C) 98.4 °F (36.9 °C) (18 0950)   Pulse Pulse  Min: 72  Max: 93 72 (18 0950)   Respiratory Resp  Min: 17  Max: 18 18 (18 0950)   Non-Invasive  Blood Pressure BP  Min: 114/56  Max: 150/74 139/61 (18 0950)   Pulse Oximetry SpO2  Min: 96 %  Max: 98 % 98 % (18 0950)   Arterial  Blood Pressure No Data Recorded     O2 O2 Flow Rate (L/min)  Avg: 3 L/min  Min: 3 L/min   Min taken time: 18 0950  Max: 3 L/min   Max taken time: 18 0950 3 L/min (18 0950)     Vital Most Recent Value First Value   Weight 123.6 kg (18 0438) Weight: 122.3 kg (18 0910)   Height 5' 8\" (172.7 cm) (18 1020) Height: 5' 8\" (172.7 cm) (18 1357)   BMI 41.52 (18 0438) N/A     Weight over the past 48 Hours:   Patient Vitals for the past 48 hrs:   Weight   18 0501 125.2 kg   18 0800 125.2 kg   18 1154 123.3 kg   18 0438 123.6 kg       Medications/Infusions:  Scheduled:   • furosemide  60 mg Oral Daily   • febuxostat  40 mg Oral Daily   • metOLazone  2.5 mg Oral Daily   • insulin glargine  40 Units Subcutaneous Nightly   • insulin lispro   Subcutaneous TID AC   • linezolid  600 mg  Oral 2 times per day   • ciprofloxacin  500 mg Oral QAM AC   • famotidine  20 mg Oral Daily   • heparin (porcine)  5,000 Units Subcutaneous 3 times per day   • tamsulosin  0.4 mg Oral Daily PC   • predniSONE  10 mg Oral Daily   • hydrALAZINE  25 mg Oral 2 times per day   • ferrous sulfate  325 mg Oral Daily with breakfast   • cloNIDine  0.1 mg Oral 2 times per day   • carvedilol  12.5 mg Oral BID WC   • atorvastatin  80 mg Oral Nightly   • aspirin  81 mg Oral Daily   • amLODIPine  10 mg Oral Daily   • valproic acid  250 mg Oral 2 times per day   • bacitracin   Topical BID       Last Stool Occurrence: 1 (Large) (08/25/18 0930)    Intake/Output:      Intake/Output Summary (Last 24 hours) at 08/26/18 1145  Last data filed at 08/26/18 0611   Gross per 24 hour   Intake             1527 ml   Output             2230 ml   Net             -703 ml       Review of Systems:    Review of systems not obtained due to patient factors.    Physical Exam:   General appearance:  alert, no acute distress, well-developed and obese, oriented to self, flat affect, Lungs:  clear to auscultation anteriorly and posteriorly bilaterally and normal percussion posteriorly bilaterally, has reproducible L anterior chest pain to palpation. Heart:  no click, no gallop, no heaves, no murmur, no rub, no thrills, regular rate and rhythm, S1 normal and S2 normal, Abdomen:  bowel sounds normal, no hernias, no masses, no organomegaly, mild RUQ tenderness - improved, soft and drain in place but 2nd drain placed with clear light brown fluid. Extremities:  no clubbing, no cyanosis and no edema with LLE wrapped and Skin:  mobility and turgor normal, no abnormal pigmentation, no evidence of bleeding or bruising, no lesions noted and texture normal  Capillary refill: less than 2 seconds  Peripheral pulses: present  Mahukar R neck.     Laboratory Results:    Lab Results   Component Value Date    SODIUM 138 08/26/2018    POTASSIUM 4.2 08/26/2018    BUN 35 (H)  08/26/2018    CREATININE 2.82 (H) 08/26/2018    WBC 20.1 (H) 08/26/2018    HCT 27.6 (L) 08/26/2018    HGB 9.0 (L) 08/26/2018     08/26/2018    INR 1.4 08/15/2018    RAPDTR 5.50 (HH) 06/29/2018    PTT 40 (H) 06/30/2018    GLUCOSE 134 (H) 08/26/2018    TSH 1.607 04/03/2018     (H) 08/14/2018    CHOLESTEROL 119 05/01/2017    HDL 44 (L) 05/01/2017    CALCLDL 61 05/01/2017    TRIGLYCERIDE 68 05/01/2017    MG 1.7 08/26/2018    DEDE 1.19 05/07/2018    MRSAPC POSITIVE (A) 06/27/2018       Assessment and Plan:    1. 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 Carson) will order CT ABD/pelvis as vomiting today.    8/23: no N/V today. CT abd found fluid pocket perihepatically. IR consulted and for percutaneous removal today.    8/24: no N/V. IR placed drain(now has 2) . New drain with clear light brown but other drain has decreased output. Nontender. Palliative care meeting today at 1500.    8/25: no N/V. IR has seen \"Minimal outputs from sol tube after placement of perihepatic drain, flushes well, perihepatic drain does not flush at all.\" IR ordering CT to assess drain placement and \".Continue on monitoring drain output while in the hospital, with 6 cc sterile NS flush every once a day to keep the tip of the catheter open and record output in #cc/24hrs. Will consider cholangiogram in the next 2-4 weeks, vs removal of tube at  cholecystectomy. \"  Palliative care NP and I spoke with family(APOA-wife,daughter,erin) and for now will make pt DNR-MMS. Otherwise continue cares until we know status of renal function. WBC 18.1   8/26: WBC 20.1. Looks better today(more alert). Both drains now producing. Cholecystotomy drain with dark brown/blackish liquid. 2nd drain appears to have similar light brown clear liquid. Surgery ordered CT because of 1 drain not functioning but flushed and both working now. Perihepatic fluid without organisms seen,cx neg so far x 2 days.   CT abd/pelvis: \"IMPRESSION:   1. Unchanged positioning of cholecystostomy tube.    2. Persistently dilated, thick-walled gallbladder with stones near the  gallbladder neck.    3. Marked urinary bladder wall thickening with adjacent fat stranding  suspicious for cystitis. Correlate with urinalysis.    4. Redmond catheter in place with balloon inflated within the prostatic  urethra. Consider repositioning.    5. Hepatic cirrhosis with small amount of perihepatic ascites.    6. 2.4 cm indeterminate lesion along the posterior aspect of the right  kidney. Either a hemorrhagic cyst or exophytic mass. Consider further  evaluation with noncontrast MRI.    7. Small right pleural effusion.\"  Pt refused meds today.   Again, pt not candidate for surgery as pt has crashed in past with anesthesia x2.      2. Chronic atrial fibrillation   - home eliquis on hold for now (will restart on dc), rate controlled with coreg   8/22: restart Eliquis after CT abdomen if no procedure needed   8/23: awaiting procedure . Eliquis when ok with IR.    8/24: RN to update IR about decrease of flow from 1st drain and decide if ok for Eliquis   8/25: hold Eliquis until IR figures out drain.    8/26: was going to restart Eliquis however has most likely hemorrhagic cyst on R kidney which has increased in size from 1.7 cm to 2.4 cm. Has been on heparin for DVT prophylaxis.      3. Chronic systolic heart failure  -  switched to oral diuretics  - recent echo EF 37%, weight is up 1 kg only    8/23: 123.6kg(up 0.3 from 2 days ago)   8/24: 123.5 kg   8/25: 125.2 Kg   8/26: Wt 123.6 kg      4. Bullous pemphigoid   - low dose oral steroids, wound care for dressing changes     5. CAD, stable     6. Left pleural effusion and infiltrate vs scarring   - chronic, stable     7. Acute kidney injury on CKD, stage III   - peaked 3.14, creatinine up a little today  - nephrology following   8/22:  CREATININE 3.58, BUN 86; yesterday creat 3.03, BUN 82. Will decrease lasix to 60 mg po daily from BID. Nephrology following. Redmond placed.    8/23: creatinine 4.21. Nephrology has seen and will have Mahukar placed by IR for dialysis tomorrow.    8/24: creat 4.53. Mahukar placed R neck. Receiving first dialysis today. Needs to get HD tomorrow. No obstruction on CT.    8/25: receiving HD today(2nd). Creat 3.36   8/26: creat 2.82.  For HD(3rd treatment) tomorrow. Awaiting to see if kidney function going to improve before making decisions about his future with palliative care.     8. Essential Hypertension, controlled     9. Dementia- activated POA     10. Anemia of chronic disease     11. Obstructive sleep apnea  - refuses CPAP     12. Diabetes mellitus Type II, improved control with dose adjustments  - continue Lantus and SSI   8/23: -200   8/24: 138-248.    8/25:    8/26: 118-153     13. Morbid obesity (BMI 40.85)         14. Situational depression  - start anti-depressant once off Zyvox    15. Recurrent/chronic osteomyelitis LE/knee with nonhealing wound L leg.   - will need lifelong cipro suppressive therapy     Dispo: PORFIRIO placement when stable.            DVT/VTE Prophylaxis:  VTE Pharmacologic Prophylaxis: Yes  VTE Mechanical Prophylaxis: Yes      Discussed with or notes reviewed:  Family and Patient      Perry Thomason MD     From 7pm to 7am,  please contact the Hospitalist on call: [phone]     -Cr not improved. As per nephro needs HD. CANDI henning to be placed today  -Diuresis as above. HD as per nephro  -F/u nephrology. Avoid nephrotoxins.  -Trend labs. -Cr not improved. As per nephro needs HD. IR juvencio to be placed tomorrow  -Diuresis as above. HD as per nephro  -F/u nephrology. Avoid nephrotoxins.  -Trend labs.

## 2021-05-12 NOTE — PROGRESS NOTE ADULT - SUBJECTIVE AND OBJECTIVE BOX
Medications:  acetaminophen   Tablet .. 650 milliGRAM(s) Oral every 6 hours PRN  allopurinol 100 milliGRAM(s) Oral daily  aspirin enteric coated 81 milliGRAM(s) Oral daily  atorvastatin 80 milliGRAM(s) Oral at bedtime  buMETAnide Injectable 2 milliGRAM(s) IV Push two times a day  dextrose 40% Gel 15 Gram(s) Oral once  dextrose 5%. 1000 milliLiter(s) IV Continuous <Continuous>  dextrose 5%. 1000 milliLiter(s) IV Continuous <Continuous>  dextrose 50% Injectable 25 Gram(s) IV Push once  dextrose 50% Injectable 12.5 Gram(s) IV Push once  dextrose 50% Injectable 25 Gram(s) IV Push once  gabapentin 300 milliGRAM(s) Oral three times a day  glucagon  Injectable 1 milliGRAM(s) IntraMuscular once  heparin   Injectable 5000 Unit(s) SubCutaneous every 8 hours  hydrALAZINE 50 milliGRAM(s) Oral three times a day  insulin glargine Injectable (LANTUS) 55 Unit(s) SubCutaneous at bedtime  insulin lispro (ADMELOG) corrective regimen sliding scale   SubCutaneous three times a day before meals  insulin lispro (ADMELOG) corrective regimen sliding scale   SubCutaneous at bedtime  insulin lispro Injectable (ADMELOG) 20 Unit(s) SubCutaneous three times a day before meals  isosorbide   dinitrate Tablet (ISORDIL) 10 milliGRAM(s) Oral three times a day  ketotifen 0.025% Ophthalmic Solution 1 Drop(s) Both EYES two times a day PRN  metoprolol succinate  milliGRAM(s) Oral two times a day  multivitamin 1 Tablet(s) Oral daily      Vitals:  Vital Signs Last 24 Hrs  T(C): 36.9 (12 May 2021 06:11), Max: 36.9 (12 May 2021 02:12)  T(F): 98.5 (12 May 2021 06:11), Max: 98.5 (12 May 2021 02:12)  HR: 68 (12 May 2021 06:11) (63 - 68)  BP: 123/56 (12 May 2021 06:11) (123/56 - 154/75)  BP(mean): --  RR: 18 (12 May 2021 06:11) (16 - 18)  SpO2: 96% (12 May 2021 06:11) (94% - 99%)    Daily     Daily     I&O's Detail    11 May 2021 07:01  -  12 May 2021 07:00  --------------------------------------------------------  IN:  Total IN: 0 mL    OUT:    Voided (mL): 700 mL  Total OUT: 700 mL    Total NET: -700 mL          Physical Exam:     General: No distress. Comfortable.  HEENT: EOM intact.  Neck: Neck supple. JVP not elevated. No masses  Chest: Clear to auscultation bilaterally  CV: Normal S1 and S2. No murmurs, rub, or gallops. Radial pulses normal.  Abdomen: Soft, non-distended, non-tender  Skin: No rashes or skin breakdown  Neurology: Alert and oriented times three. Sensation intact  Psych: Affect normal    Labs:                        9.8    5.45  )-----------( 178      ( 12 May 2021 08:37 )             30.2     05-12    134<L>  |  91<L>  |  160<H>  ----------------------------<  132<H>  4.4   |  29  |  4.94<H>    Ca    10.3      12 May 2021 08:37  Phos  4.9     05-12  Mg     2.8     05-12             Patient seen and examined. She is agreeable to HD.   No SOB at rest, CP, palpitations.       Medications:  acetaminophen   Tablet .. 650 milliGRAM(s) Oral every 6 hours PRN  allopurinol 100 milliGRAM(s) Oral daily  aspirin enteric coated 81 milliGRAM(s) Oral daily  atorvastatin 80 milliGRAM(s) Oral at bedtime  buMETAnide Injectable 2 milliGRAM(s) IV Push two times a day  dextrose 40% Gel 15 Gram(s) Oral once  dextrose 5%. 1000 milliLiter(s) IV Continuous <Continuous>  dextrose 5%. 1000 milliLiter(s) IV Continuous <Continuous>  dextrose 50% Injectable 25 Gram(s) IV Push once  dextrose 50% Injectable 12.5 Gram(s) IV Push once  dextrose 50% Injectable 25 Gram(s) IV Push once  gabapentin 300 milliGRAM(s) Oral three times a day  glucagon  Injectable 1 milliGRAM(s) IntraMuscular once  heparin   Injectable 5000 Unit(s) SubCutaneous every 8 hours  hydrALAZINE 50 milliGRAM(s) Oral three times a day  insulin glargine Injectable (LANTUS) 55 Unit(s) SubCutaneous at bedtime  insulin lispro (ADMELOG) corrective regimen sliding scale   SubCutaneous three times a day before meals  insulin lispro (ADMELOG) corrective regimen sliding scale   SubCutaneous at bedtime  insulin lispro Injectable (ADMELOG) 20 Unit(s) SubCutaneous three times a day before meals  isosorbide   dinitrate Tablet (ISORDIL) 10 milliGRAM(s) Oral three times a day  ketotifen 0.025% Ophthalmic Solution 1 Drop(s) Both EYES two times a day PRN  metoprolol succinate  milliGRAM(s) Oral two times a day  multivitamin 1 Tablet(s) Oral daily      Vitals:  Vital Signs Last 24 Hrs  T(C): 36.9 (12 May 2021 06:11), Max: 36.9 (12 May 2021 02:12)  T(F): 98.5 (12 May 2021 06:11), Max: 98.5 (12 May 2021 02:12)  HR: 68 (12 May 2021 06:11) (63 - 68)  BP: 123/56 (12 May 2021 06:11) (123/56 - 154/75)  BP(mean): --  RR: 18 (12 May 2021 06:11) (16 - 18)  SpO2: 96% (12 May 2021 06:11) (94% - 99%)    Daily     Daily     I&O's Detail    11 May 2021 07:01  -  12 May 2021 07:00  --------------------------------------------------------  IN:  Total IN: 0 mL    OUT:    Voided (mL): 700 mL  Total OUT: 700 mL    Total NET: -700 mL          Physical Exam:     General: No distress. Comfortable.  HEENT: EOM intact.  Neck: Neck supple. JVP difficult to assess. No masses  Chest: Clear to auscultation bilaterally  CV: Normal S1 and S2. No murmurs, rub, or gallops. Radial pulses normal. 1+ tight b/l LE edema. Warm b/l.   Abdomen: Soft, non-distended, non-tender  Skin: No rashes or skin breakdown  Neurology: Alert and oriented times three. Sensation intact  Psych: Affect normal    Labs:                        9.8    5.45  )-----------( 178      ( 12 May 2021 08:37 )             30.2     05-12    134<L>  |  91<L>  |  160<H>  ----------------------------<  132<H>  4.4   |  29  |  4.94<H>    Ca    10.3      12 May 2021 08:37  Phos  4.9     05-12  Mg     2.8     05-12

## 2021-05-12 NOTE — PROGRESS NOTE ADULT - ASSESSMENT
71F PMH of DM2 with neuropathy, HTN, CKD, PAD s/p angiogram RLE, HLD, and Thyroid nodules (benign biopsy x2 as per patient) who presents to the hospital with complaints of worsening SOB, LE edema, orthopnea, weight gain, and increasing abdominal girth likely 2/2 CHF exacerbation. Started on bumex gtt. Renal following for RADHA on CKD.     poc d/w pt, daughters bedside, pts RN, HD RN

## 2021-05-12 NOTE — PROGRESS NOTE ADULT - PROBLEM SELECTOR PLAN 6
-FS still suboptimally controlled.  -Appreciate endo recs.  -Current on basal/bolus w/ ISS. Titrate as per endo. -FS improved  -Appreciate endo recs.  -Current on basal/bolus w/ ISS. Titrate as per endo.

## 2021-05-12 NOTE — CHART NOTE - NSCHARTNOTEFT_GEN_A_CORE
PRE-INTERVENTIONAL RADIOLOGY PROCEDURE NOTE  Patient Age: 71  Patient Gender: F  Procedure (including site / side if known): shiley placement   Diagnosis / Indication: SKINNY on CKD   Interventional Radiology Attending Physician: Dr. Joshi   Ordering Attending Physician: Dr. Thompson   Pertinent medical history: Skinny on CKD, HTN, DM, CHF, PAD  Pertinent labs:05-12    134<L>  |  91<L>  |  160<H>  ----------------------------<  132<H>  4.4   |  29  |  4.94<H>    Ca    10.3      12 May 2021 08:37  Phos  4.9     05-12  Mg     2.8     05-12                          9.8    5.45  )-----------( 178      ( 12 May 2021 08:37 )             30.2     Patient and Family aware? Yes

## 2021-05-12 NOTE — PROGRESS NOTE ADULT - REASON FOR ADMISSION
Worsening SOB Alert-The patient is alert, awake and responds to voice. The patient is oriented to time, place, and person. The triage nurse is able to obtain subjective information.

## 2021-05-12 NOTE — PROGRESS NOTE ADULT - ASSESSMENT
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 Endocrine consulted for uncontrolled DM2 with hyperglycemia inpatient.    Problem/Recommendation - 1:  Problem: Type 2 diabetes mellitus with hyperglycemia, with long-term current use of insulin. Recommendation: T2DM with hgb a1c of 6.8% with FS tightly controlled at home but on unconventional regimen of BID NPH and regular insulin, states it may have been due to cost.   -Inpatient BG target 100-180 mg/dl, fasting glucose improved to goal range, prandial glucose improved but remains mildly elevated.  -Continue Lantus 55 units qhs   -Increase Admelog to 22 units tidac   -Continue Admelog moderate dose correction scale tidac and moderate qhs scale    Discharge   B/B insulin as per insurance if cost of pen is reasonable, if not then can use novolin 70/30 pen or vial, finalize before discharge.  Can send scripts for basal and bolus insulin pens to assess for cost.    Would benefit from Endocrinologist f/u   Dr. Lino   [phone]  [address] [facility] [address] [facility], [place] [address].    Problem/Recommendation - 2:  ·  Problem: Other hyperlipidemia.  Recommendation: LDL 72 on lipitor.     Problem/Recommendation - 3:  ·  Problem: Essential hypertension.  Recommendation: Not at goal of ,130/80 on toprol, isosorbide, hydralazine, metoprolol, bumex    Problem/Recommendation - 4:  ·  Problem: Thyroid nodule.  Recommendation: RLP hypoechoic nodule 13mm and LLP isoechoic nodule 1.9 cm. Both benign according to pt in the past but would need records. Can follow up outpatient.    Frances Lynch MD  Division of Endocrinology  Pager: [phone]    If after 6PM or before 9AM, or on weekends/holidays, please call endocrine answering service for assistance ([phone]).  For nonurgent matters email [email or web address] for assistance.

## 2021-05-12 NOTE — PROGRESS NOTE ADULT - PROBLEM SELECTOR PLAN 1
Pt with CKD3, follows with Dr Escudero in outpt clinic.   Baseline Cr ~2 from earlier this year  RFT progressively worsening, Cr 4.9 today  No  obstruction on renal US. No significant proteinuria on UA. BP stable, no obvious nephrotoxin.   Non-oliguric, and responsive to diuretics.   Discussed with pt again regarding worsening renal function, and explained risk and benefits of dialysis.   would need to start HD. pt agreed. Informed consent for HD obtained from pt. all her and daughters q's addressed to their satisfaction.  plan for shiley cath placement tomorrow by IR and 1st hd post procedure  no urgent indication for dialysis today  monitor daily BMP, strict I/O.   dose all meds for eGFR<15ml/min.   avoid ACEi/ARB/NSAIDs/Nephrotoxics.

## 2021-05-12 NOTE — PROGRESS NOTE ADULT - PROBLEM SELECTOR PLAN 1
- Acute on chronic HFpEF.  - s/p RHC 5/4 c/w severe pulm HTN  - TTE normal LVEF  - S/p metolazone x2  -continue hydralazine 50 tid, isordil 10 tid, Toprol 100 mg po BID.   -Cont bumex PO.  - 1.5L fluid restriction, daily weights, I/O   -Appreciate HF recs.

## 2021-05-12 NOTE — PROGRESS NOTE ADULT - TIME BILLING
labs, chart reviewed  For any question, call:  Cell # [phone]  Pager # [phone]  office # [phone]

## 2021-05-12 NOTE — CHART NOTE - NSCHARTNOTEFT_GEN_A_CORE
Notified by RN pt's Finger stick 78 , pt asymptomatic.   finger stick trend evaluated.    Patient seen and evaluated.   w/d w/n morbidly obese elderly black female found sitting on the side of the bed eating her turkey sandwich. Patient stated she ate a salad that her family member brought her earlier.   Repeat FS = 126.   Patient due to basal insulin given.  pt stable will continue to monitor       CAPILLARY BLOOD GLUCOSE      POCT Blood Glucose.: 126 mg/dL (12 May 2021 22:58)  POCT Blood Glucose.: 78 mg/dL (12 May 2021 21:45)  POCT Blood Glucose.: 121 mg/dL (12 May 2021 16:53)  POCT Blood Glucose.: 208 mg/dL (12 May 2021 12:17)  POCT Blood Glucose.: 130 mg/dL (12 May 2021 08:27)

## 2021-05-12 NOTE — PROGRESS NOTE ADULT - SUBJECTIVE AND OBJECTIVE BOX
Chief Complaint: DM2    History: tolerating po  no hypoglycemia events or symptoms    MEDICATIONS  (STANDING):  allopurinol 100 milliGRAM(s) Oral daily  aspirin enteric coated 81 milliGRAM(s) Oral daily  atorvastatin 80 milliGRAM(s) Oral at bedtime  buMETAnide Injectable 2 milliGRAM(s) IV Push two times a day  dextrose 40% Gel 15 Gram(s) Oral once  dextrose 5%. 1000 milliLiter(s) (50 mL/Hr) IV Continuous <Continuous>  dextrose 5%. 1000 milliLiter(s) (100 mL/Hr) IV Continuous <Continuous>  dextrose 50% Injectable 25 Gram(s) IV Push once  dextrose 50% Injectable 12.5 Gram(s) IV Push once  dextrose 50% Injectable 25 Gram(s) IV Push once  gabapentin 300 milliGRAM(s) Oral three times a day  glucagon  Injectable 1 milliGRAM(s) IntraMuscular once  heparin   Injectable 5000 Unit(s) SubCutaneous every 8 hours  hydrALAZINE 50 milliGRAM(s) Oral three times a day  insulin glargine Injectable (LANTUS) 55 Unit(s) SubCutaneous at bedtime  insulin lispro (ADMELOG) corrective regimen sliding scale   SubCutaneous three times a day before meals  insulin lispro (ADMELOG) corrective regimen sliding scale   SubCutaneous at bedtime  insulin lispro Injectable (ADMELOG) 20 Unit(s) SubCutaneous three times a day before meals  isosorbide   dinitrate Tablet (ISORDIL) 10 milliGRAM(s) Oral three times a day  metoprolol succinate  milliGRAM(s) Oral two times a day  multivitamin 1 Tablet(s) Oral daily    MEDICATIONS  (PRN):  acetaminophen   Tablet .. 650 milliGRAM(s) Oral every 6 hours PRN Temp greater or equal to 38C (100.4F), Mild Pain (1 - 3), Moderate Pain (4 - 6)  ketotifen 0.025% Ophthalmic Solution 1 Drop(s) Both EYES two times a day PRN Allergic Conjunctivitis      Allergies    No Known Allergies    Intolerances      Review of Systems:  ALL OTHER SYSTEMS REVIEWED AND NEGATIVE      PHYSICAL EXAM:  VITALS: T(C): 36.8 (05-12-21 @ 11:20)  T(F): 98.2 (05-12-21 @ 11:20), Max: 98.5 (05-12-21 @ 02:12)  HR: 66 (05-12-21 @ 11:20) (64 - 68)  BP: 146/74 (05-12-21 @ 11:20) (123/56 - 146/74)  RR:  (16 - 18)  SpO2:  (94% - 99%)  Wt(kg): --  GENERAL: NAD, well-groomed, well-developed  EYES: No proptosis, no lid lag, anicteric  HEENT:  Atraumatic, Normocephalic, moist mucous membranes  THYROID: Normal size, no palpable nodules  RESPIRATORY: nonlabored respirations  PSYCH: Alert and oriented x 3, normal affect, normal mood    CAPILLARY BLOOD GLUCOSE      POCT Blood Glucose.: 208 mg/dL (12 May 2021 12:17)  POCT Blood Glucose.: 130 mg/dL (12 May 2021 08:27)  POCT Blood Glucose.: 268 mg/dL (11 May 2021 22:22)  POCT Blood Glucose.: 191 mg/dL (11 May 2021 17:12)      05-12    134<L>  |  91<L>  |  160<H>  ----------------------------<  132<H>  4.4   |  29  |  4.94<H>    EGFR if : 9<L>  EGFR if non : 8<L>    Ca    10.3      05-12  Mg     2.8     05-12  Phos  4.9     05-12        A1C with Estimated Average Glucose Result: 6.8 % (04-30-21 @ 07:41)      Thyroid Function Tests:  04-30 @ 07:41 TSH 2.19 FreeT4 -- T3 -- Anti TPO -- Anti Thyroglobulin Ab -- TSI --

## 2021-05-12 NOTE — PROGRESS NOTE ADULT - SUBJECTIVE AND OBJECTIVE BOX
[facility] - S Collette / Donna S /D Aroldo/ S Elpidio/ ROSALIA Morocho/ Ben Escudero / URIEL Groveu/ O Dominic  service -[phone], office [phone]  ---------------------------------------------------------------------------------------------------------------    Patient seen and examined bedside    Subjective and Objective: No overnight events, sob resolved. No complaints today. feeling better    Allergies: No Known Allergies      Hospital Medications:   MEDICATIONS  (STANDING):  allopurinol 100 milliGRAM(s) Oral daily  aspirin enteric coated 81 milliGRAM(s) Oral daily  atorvastatin 80 milliGRAM(s) Oral at bedtime  buMETAnide Injectable 2 milliGRAM(s) IV Push two times a day  dextrose 40% Gel 15 Gram(s) Oral once  dextrose 5%. 1000 milliLiter(s) (50 mL/Hr) IV Continuous <Continuous>  dextrose 5%. 1000 milliLiter(s) (100 mL/Hr) IV Continuous <Continuous>  dextrose 50% Injectable 25 Gram(s) IV Push once  dextrose 50% Injectable 12.5 Gram(s) IV Push once  dextrose 50% Injectable 25 Gram(s) IV Push once  gabapentin 300 milliGRAM(s) Oral three times a day  glucagon  Injectable 1 milliGRAM(s) IntraMuscular once  heparin   Injectable 5000 Unit(s) SubCutaneous every 8 hours  hydrALAZINE 50 milliGRAM(s) Oral three times a day  insulin glargine Injectable (LANTUS) 55 Unit(s) SubCutaneous at bedtime  insulin lispro (ADMELOG) corrective regimen sliding scale   SubCutaneous three times a day before meals  insulin lispro (ADMELOG) corrective regimen sliding scale   SubCutaneous at bedtime  insulin lispro Injectable (ADMELOG) 22 Unit(s) SubCutaneous three times a day before meals  isosorbide   dinitrate Tablet (ISORDIL) 10 milliGRAM(s) Oral three times a day  metoprolol succinate  milliGRAM(s) Oral two times a day  multivitamin 1 Tablet(s) Oral daily      REVIEW OF SYSTEMS:  CONSTITUTIONAL: No weakness, fevers or chills  EYES/ENT: No visual changes;  No vertigo or throat pain   NECK: No pain or stiffness  RESPIRATORY: No cough, wheezing, hemoptysis; No shortness of breath  CARDIOVASCULAR: No chest pain or palpitations.  GASTROINTESTINAL: No abdominal or epigastric pain. No nausea, vomiting, or hematemesis; No diarrhea or constipation. No melena or hematochezia.  GENITOURINARY: No dysuria, frequency, foamy urine, urinary urgency, incontinence or hematuria  NEUROLOGICAL: No numbness or weakness  SKIN: No itching, burning, rashes, or lesions   VASCULAR: No bilateral lower extremity edema.   All other review of systems is negative unless indicated above.    VITALS:  T(F): 98.2 (05-12-21 @ 11:20), Max: 98.5 (05-12-21 @ 02:12)  HR: 62 (05-12-21 @ 15:18)  BP: 139/60 (05-12-21 @ 15:18)  RR: 17 (05-12-21 @ 11:20)  SpO2: 95% (05-12-21 @ 11:20)  Wt(kg): --    05-11 @ 07:01  -  05-12 @ 07:00  --------------------------------------------------------  IN: 0 mL / OUT: 700 mL / NET: -700 mL    05-12 @ 07:01  -  05-12 @ 17:19  --------------------------------------------------------  IN: 490 mL / OUT: 600 mL / NET: -110 mL          PHYSICAL EXAM:  Constitutional: NAD  HEENT: anicteric sclera, oropharynx clear  Neck: No JVD  Respiratory: CTAB, no wheezes, rales or rhonchi  Cardiovascular: S1, S2, RRR  Gastrointestinal: BS+, soft, NT/ND  Extremities: No cyanosis or clubbing. No peripheral edema  Neurological: A/O x 3, no focal deficits  Psychiatric: Normal mood, normal affect  : No CVA tenderness. No paredes.   Skin: No rashes  Vascular Access:    LABS:  05-12    134<L>  |  91<L>  |  160<H>  ----------------------------<  132<H>  4.4   |  29  |  4.94<H>    Ca    10.3      12 May 2021 08:37  Phos  4.9     05-12  Mg     2.8     05-12      Creatinine Trend: 4.94 <--, 4.72 <--, 4.79 <--, 4.80 <--, 4.17 <--, 3.80 <--, 3.96 <--                        9.8    5.45  )-----------( 178      ( 12 May 2021 08:37 )             30.2     Urine Studies:        RADIOLOGY & ADDITIONAL STUDIES:   [facility] - S Collette / Donna S /D Aroldo/ S Elpidio/ ROSALIA Morocho/ Ben Escudero / URIEL Raymundo/ O Dominic  service -[phone], office [phone]  ---------------------------------------------------------------------------------------------------------------    Patient seen and examined bedside    Subjective and Objective: No overnight events, denied N/V/D/sob. No complaints today. not feeling well  today  both daughters bedside    Allergies: No Known Allergies      Hospital Medications:   MEDICATIONS  (STANDING):  allopurinol 100 milliGRAM(s) Oral daily  aspirin enteric coated 81 milliGRAM(s) Oral daily  atorvastatin 80 milliGRAM(s) Oral at bedtime  buMETAnide Injectable 2 milliGRAM(s) IV Push two times a day  dextrose 40% Gel 15 Gram(s) Oral once  dextrose 5%. 1000 milliLiter(s) (50 mL/Hr) IV Continuous <Continuous>  dextrose 5%. 1000 milliLiter(s) (100 mL/Hr) IV Continuous <Continuous>  dextrose 50% Injectable 25 Gram(s) IV Push once  dextrose 50% Injectable 12.5 Gram(s) IV Push once  dextrose 50% Injectable 25 Gram(s) IV Push once  gabapentin 300 milliGRAM(s) Oral three times a day  glucagon  Injectable 1 milliGRAM(s) IntraMuscular once  heparin   Injectable 5000 Unit(s) SubCutaneous every 8 hours  hydrALAZINE 50 milliGRAM(s) Oral three times a day  insulin glargine Injectable (LANTUS) 55 Unit(s) SubCutaneous at bedtime  insulin lispro (ADMELOG) corrective regimen sliding scale   SubCutaneous three times a day before meals  insulin lispro (ADMELOG) corrective regimen sliding scale   SubCutaneous at bedtime  insulin lispro Injectable (ADMELOG) 22 Unit(s) SubCutaneous three times a day before meals  isosorbide   dinitrate Tablet (ISORDIL) 10 milliGRAM(s) Oral three times a day  metoprolol succinate  milliGRAM(s) Oral two times a day  multivitamin 1 Tablet(s) Oral daily      VITALS:  T(F): 98.2 (05-12-21 @ 11:20), Max: 98.5 (05-12-21 @ 02:12)  HR: 62 (05-12-21 @ 15:18)  BP: 139/60 (05-12-21 @ 15:18)  RR: 17 (05-12-21 @ 11:20)  SpO2: 95% (05-12-21 @ 11:20)  Wt(kg): --    05-11 @ 07:01  -  05-12 @ 07:00  --------------------------------------------------------  IN: 0 mL / OUT: 700 mL / NET: -700 mL    05-12 @ 07:01  -  05-12 @ 17:19  --------------------------------------------------------  IN: 490 mL / OUT: 600 mL / NET: -110 mL      PHYSICAL EXAM:  Constitutional: NAD  HEENT: anicteric sclera  Neck: No JVD  Respiratory: CTAB, no wheezes, rales or rhonchi  Cardiovascular: S1, S2, RRR  Gastrointestinal: BS+, soft, NT  Extremities: + peripheral edema  Neurological: A/O x 3, no focal deficits  Psychiatric: Normal mood, normal affect  : No paredes.     LABS:  05-12    134<L>  |  91<L>  |  160<H>  ----------------------------<  132<H>  4.4   |  29  |  4.94<H>    Ca    10.3      12 May 2021 08:37  Phos  4.9     05-12  Mg     2.8     05-12      Creatinine Trend: 4.94 <--, 4.72 <--, 4.79 <--, 4.80 <--, 4.17 <--, 3.80 <--, 3.96 <--                        9.8    5.45  )-----------( 178      ( 12 May 2021 08:37 )             30.2     Urine Studies:        RADIOLOGY & ADDITIONAL STUDIES:

## 2021-05-13 LAB
ANION GAP SERPL CALC-SCNC: 16 MMOL/L — HIGH (ref 7–14)
APTT BLD: 31.9 SEC — SIGNIFICANT CHANGE UP (ref 27–36.3)
BUN SERPL-MCNC: 163 MG/DL — HIGH (ref 7–23)
CALCIUM SERPL-MCNC: 10 MG/DL — SIGNIFICANT CHANGE UP (ref 8.4–10.5)
CHLORIDE SERPL-SCNC: 89 MMOL/L — LOW (ref 98–107)
CO2 SERPL-SCNC: 29 MMOL/L — SIGNIFICANT CHANGE UP (ref 22–31)
CREAT SERPL-MCNC: 4.63 MG/DL — HIGH (ref 0.5–1.3)
DIALYSIS INSTRUMENT RESULT - HEPATITIS B SURFACE ANTIGEN: NEGATIVE — SIGNIFICANT CHANGE UP
GLUCOSE BLDC GLUCOMTR-MCNC: 107 MG/DL — HIGH (ref 70–99)
GLUCOSE BLDC GLUCOMTR-MCNC: 141 MG/DL — HIGH (ref 70–99)
GLUCOSE BLDC GLUCOMTR-MCNC: 159 MG/DL — HIGH (ref 70–99)
GLUCOSE BLDC GLUCOMTR-MCNC: 183 MG/DL — HIGH (ref 70–99)
GLUCOSE BLDC GLUCOMTR-MCNC: 266 MG/DL — HIGH (ref 70–99)
GLUCOSE BLDC GLUCOMTR-MCNC: 60 MG/DL — LOW (ref 70–99)
GLUCOSE BLDC GLUCOMTR-MCNC: 70 MG/DL — SIGNIFICANT CHANGE UP (ref 70–99)
GLUCOSE BLDC GLUCOMTR-MCNC: 88 MG/DL — SIGNIFICANT CHANGE UP (ref 70–99)
GLUCOSE SERPL-MCNC: 188 MG/DL — HIGH (ref 70–99)
HAV IGM SER-ACNC: SIGNIFICANT CHANGE UP
HBV CORE IGM SER-ACNC: SIGNIFICANT CHANGE UP
HBV SURFACE AG SER-ACNC: SIGNIFICANT CHANGE UP
HCT VFR BLD CALC: 29.5 % — LOW (ref 34.5–45)
HCV AB S/CO SERPL IA: 0.12 S/CO — SIGNIFICANT CHANGE UP (ref 0–0.99)
HCV AB SERPL-IMP: SIGNIFICANT CHANGE UP
HGB BLD-MCNC: 9.9 G/DL — LOW (ref 11.5–15.5)
INR BLD: 1.04 RATIO — SIGNIFICANT CHANGE UP (ref 0.88–1.16)
MAGNESIUM SERPL-MCNC: 2.6 MG/DL — SIGNIFICANT CHANGE UP (ref 1.6–2.6)
MCHC RBC-ENTMCNC: 29.2 PG — SIGNIFICANT CHANGE UP (ref 27–34)
MCHC RBC-ENTMCNC: 33.6 GM/DL — SIGNIFICANT CHANGE UP (ref 32–36)
MCV RBC AUTO: 87 FL — SIGNIFICANT CHANGE UP (ref 80–100)
NRBC # BLD: 0 /100 WBCS — SIGNIFICANT CHANGE UP
NRBC # FLD: 0 K/UL — SIGNIFICANT CHANGE UP
PHOSPHATE SERPL-MCNC: 5 MG/DL — HIGH (ref 2.5–4.5)
PLATELET # BLD AUTO: 179 K/UL — SIGNIFICANT CHANGE UP (ref 150–400)
POTASSIUM SERPL-MCNC: 4.1 MMOL/L — SIGNIFICANT CHANGE UP (ref 3.5–5.3)
POTASSIUM SERPL-SCNC: 4.1 MMOL/L — SIGNIFICANT CHANGE UP (ref 3.5–5.3)
PROTHROM AB SERPL-ACNC: 11.9 SEC — SIGNIFICANT CHANGE UP (ref 10.6–13.6)
RBC # BLD: 3.39 M/UL — LOW (ref 3.8–5.2)
RBC # FLD: 14 % — SIGNIFICANT CHANGE UP (ref 10.3–14.5)
SODIUM SERPL-SCNC: 134 MMOL/L — LOW (ref 135–145)
WBC # BLD: 6.42 K/UL — SIGNIFICANT CHANGE UP (ref 3.8–10.5)
WBC # FLD AUTO: 6.42 K/UL — SIGNIFICANT CHANGE UP (ref 3.8–10.5)

## 2021-05-13 PROCEDURE — 76937 US GUIDE VASCULAR ACCESS: CPT | Mod: 26,59

## 2021-05-13 PROCEDURE — 77001 FLUOROGUIDE FOR VEIN DEVICE: CPT | Mod: 26,GC

## 2021-05-13 PROCEDURE — 99232 SBSQ HOSP IP/OBS MODERATE 35: CPT

## 2021-05-13 PROCEDURE — 99233 SBSQ HOSP IP/OBS HIGH 50: CPT

## 2021-05-13 PROCEDURE — 36556 INSERT NON-TUNNEL CV CATH: CPT

## 2021-05-13 RX ORDER — INSULIN LISPRO 100/ML
21 VIAL (ML) SUBCUTANEOUS
Refills: 0 | Status: DISCONTINUED | OUTPATIENT
Start: 2021-05-13 | End: 2021-05-18

## 2021-05-13 RX ORDER — ISOPROPYL ALCOHOL, BENZOCAINE .7; .06 ML/ML; ML/ML
1 SWAB TOPICAL
Qty: 100 | Refills: 1
Start: 2021-05-13 | End: 2021-07-01

## 2021-05-13 RX ORDER — INSULIN LISPRO 100/ML
21 VIAL (ML) SUBCUTANEOUS
Qty: 15 | Refills: 0
Start: 2021-05-13 | End: 2021-06-11

## 2021-05-13 RX ORDER — ENOXAPARIN SODIUM 100 MG/ML
55 INJECTION SUBCUTANEOUS
Qty: 15 | Refills: 0
Start: 2021-05-13 | End: 2021-06-11

## 2021-05-13 RX ORDER — CHLORHEXIDINE GLUCONATE 213 G/1000ML
1 SOLUTION TOPICAL DAILY
Refills: 0 | Status: DISCONTINUED | OUTPATIENT
Start: 2021-05-13 | End: 2021-05-22

## 2021-05-13 RX ORDER — INSULIN GLARGINE 100 [IU]/ML
55 INJECTION, SOLUTION SUBCUTANEOUS
Qty: 15 | Refills: 0
Start: 2021-05-13 | End: 2021-06-11

## 2021-05-13 RX ADMIN — GABAPENTIN 300 MILLIGRAM(S): 400 CAPSULE ORAL at 13:01

## 2021-05-13 RX ADMIN — GABAPENTIN 300 MILLIGRAM(S): 400 CAPSULE ORAL at 21:32

## 2021-05-13 RX ADMIN — HEPARIN SODIUM 5000 UNIT(S): 5000 INJECTION INTRAVENOUS; SUBCUTANEOUS at 05:53

## 2021-05-13 RX ADMIN — Medication 81 MILLIGRAM(S): at 13:02

## 2021-05-13 RX ADMIN — BUMETANIDE 2 MILLIGRAM(S): 0.25 INJECTION INTRAMUSCULAR; INTRAVENOUS at 05:53

## 2021-05-13 RX ADMIN — INSULIN GLARGINE 55 UNIT(S): 100 INJECTION, SOLUTION SUBCUTANEOUS at 21:33

## 2021-05-13 RX ADMIN — HEPARIN SODIUM 5000 UNIT(S): 5000 INJECTION INTRAVENOUS; SUBCUTANEOUS at 21:32

## 2021-05-13 RX ADMIN — Medication 22 UNIT(S): at 13:00

## 2021-05-13 RX ADMIN — Medication 50 MILLIGRAM(S): at 21:32

## 2021-05-13 RX ADMIN — ISOSORBIDE DINITRATE 10 MILLIGRAM(S): 5 TABLET ORAL at 18:11

## 2021-05-13 RX ADMIN — Medication 21 UNIT(S): at 18:25

## 2021-05-13 RX ADMIN — Medication 100 MILLIGRAM(S): at 18:11

## 2021-05-13 RX ADMIN — Medication 100 MILLIGRAM(S): at 05:53

## 2021-05-13 RX ADMIN — Medication 2: at 09:23

## 2021-05-13 RX ADMIN — ISOSORBIDE DINITRATE 10 MILLIGRAM(S): 5 TABLET ORAL at 05:53

## 2021-05-13 RX ADMIN — Medication 2: at 21:32

## 2021-05-13 RX ADMIN — HEPARIN SODIUM 5000 UNIT(S): 5000 INJECTION INTRAVENOUS; SUBCUTANEOUS at 13:02

## 2021-05-13 RX ADMIN — ATORVASTATIN CALCIUM 80 MILLIGRAM(S): 80 TABLET, FILM COATED ORAL at 21:32

## 2021-05-13 RX ADMIN — Medication 22 UNIT(S): at 09:22

## 2021-05-13 RX ADMIN — GABAPENTIN 300 MILLIGRAM(S): 400 CAPSULE ORAL at 05:53

## 2021-05-13 RX ADMIN — BUMETANIDE 2 MILLIGRAM(S): 0.25 INJECTION INTRAMUSCULAR; INTRAVENOUS at 18:11

## 2021-05-13 RX ADMIN — Medication 1 TABLET(S): at 13:02

## 2021-05-13 RX ADMIN — Medication 50 MILLIGRAM(S): at 05:53

## 2021-05-13 RX ADMIN — CHLORHEXIDINE GLUCONATE 1 APPLICATION(S): 213 SOLUTION TOPICAL at 13:05

## 2021-05-13 RX ADMIN — Medication 100 MILLIGRAM(S): at 13:02

## 2021-05-13 NOTE — PROGRESS NOTE ADULT - PROBLEM SELECTOR PLAN 1
Pt with CKD3, follows with Dr Escudero in outpt clinic.   Baseline Cr ~2 from earlier this year  Worsening renal failure, planning to start on HD.   No  obstruction on renal US. No significant proteinuria on UA. BP stable, no obvious nephrotoxin.   s/p shiley cath placement, plan for first HD today, and then repeat treatment tomorrow as well.  Gradually increase UF goal with subsequent treatments to optimize fluid status.   Pt still markedly volume overloaded.   monitor daily BMP, strict I/O.   dose all meds for eGFR<15ml/min.   avoid ACEi/ARB/NSAIDs/Nephrotoxics.

## 2021-05-13 NOTE — PROGRESS NOTE ADULT - ASSESSMENT
71F PMH of DM2 with neuropathy, HTN, CKD, PAD s/p angiogram RLE, HLD, and Thyroid nodules (benign biopsy x2 as per patient) who presents to the hospital with complaints of worsening SOB, LE edema, orthopnea, weight gain, and increasing abdominal girth likely 2/2 CHF exacerbation. Started on bumex gtt. Renal following for RADHA on CKD.

## 2021-05-13 NOTE — PROVIDER CONTACT NOTE (HYPOGLYCEMIA EVENT) - NS PROVIDER CONTACT BACKGROUND-HYPO
Age: 71y    Gender: Female    POCT Blood Glucose:  88 mg/dL (05-13-21 @ 17:17)  70 mg/dL (05-13-21 @ 16:56)  60 mg/dL (05-13-21 @ 16:44)  141 mg/dL (05-13-21 @ 12:23)  183 mg/dL (05-13-21 @ 09:17)  126 mg/dL (05-12-21 @ 22:58)  78 mg/dL (05-12-21 @ 21:45)      eMAR:allopurinol   100 milliGRAM(s) Oral (05-13-21 @ 13:02)    atorvastatin   80 milliGRAM(s) Oral (05-12-21 @ 23:00)    insulin glargine Injectable (LANTUS)   55 Unit(s) SubCutaneous (05-12-21 @ 22:59)    insulin lispro (ADMELOG) corrective regimen sliding scale   2 Unit(s) SubCutaneous (05-13-21 @ 09:23)    insulin lispro Injectable (ADMELOG)   22 Unit(s) SubCutaneous (05-13-21 @ 13:00)   22 Unit(s) SubCutaneous (05-13-21 @ 09:22)

## 2021-05-13 NOTE — PROGRESS NOTE ADULT - PROBLEM SELECTOR PLAN 2
-Cr not improved. As per nephro needs HD.   -S/p Cedric on 5/13.  -Diuresis as above. HD as per nephro  -F/u nephrology. Avoid nephrotoxins.  -Trend labs.

## 2021-05-13 NOTE — CHART NOTE - NSCHARTNOTEFT_GEN_A_CORE
Patient had hypoglycemia while at HD. FS was 60. Patient was given fruit juice and improved to 70. Pt had a sandwich and improved further to 107. Saw patient while at HD- She was asymptomatic. She states she did not eat her lunch since "it was not appetizing" therefore likely reason for her hypoglycemia. She states she will be eating her future meals therefore did not adjust insulin. Patients insulin was adjusted by Dr. Banerjee today - decreased Admelog to 21 units TID from 22 units. Effie emailed for further recs. Will f/u recs. Discussed with Dr. Rodriguez.    Of note, I also sent over basal/bolus pens to her pharmacy and none of them were covered with her insurance. Each pen would have cost $397-$500. Emailed effie to make them aware. Will continue to f/u recs for discharge planning for insulin. Patient had hypoglycemia while at HD. FS was 60. Patient was given fruit juice and improved to 70. Pt had a sandwich and improved further to 107. Saw patient while at HD- She was asymptomatic. She states she did not eat her lunch since "it was not appetizing" therefore likely reason for her hypoglycemia. She states she will be eating her future meals therefore did not adjust insulin. Patients insulin was adjusted by Dr. Banerjee today - decreased Admelog to 21 units TID from 22 units. Endo emailed for further recs. Will f/u recs. Discussed with Dr. Rodriguez.    Of note, I also sent over basal/bolus pens to her pharmacy and none of them were covered with her insurance. Each pen would have cost $397-$500. Emailed endo to make them aware. Will continue to f/u recs for discharge planning for insulin.    Addendum: Spoke with Dr. Banerjee who agrees with above. Will continue to monitor FS.

## 2021-05-13 NOTE — PROGRESS NOTE ADULT - ASSESSMENT
71 y.o. Female w/ hx HTN, DM2 with neuropathy, Recently diagnosed CKD, PAD s/p angiogram RLE (pt denies any stents, just "clearing out"), HLD, and Thyroid nodules (benign biopsy x2 as per patient) p/w worsening SOB, LE edema, orthopnea, weight gain, and increasing abdominal girth likely 2/2 CHF exacerbation. Also with complaints of new intermittent double vision (occasionally when watching TV, does not occur otherwise) and new dysphagia intermittently. Started on diuresis, eventually placed on bumex drip. Cardio and nephro on board. Cr worsening and not improving. Decision made to start pt on HD. Cedric placed on 5/13. Remains hospitalized pending further optimization.

## 2021-05-13 NOTE — PROGRESS NOTE ADULT - SUBJECTIVE AND OBJECTIVE BOX
[facility] Division of Hospital Medicine  Chad BROWN KatieElías) MD Michael  Pager [phone]    SUBJECTIVE:  Follow up for CHF.    Pt seen and evaluated at bedside this AM. No o/n events. Denies any SOb/Cp/NV. Tolerating placement of Shiley this AM.      ROS: All systems negative except as noted.    Vital Signs Last 24 Hrs  T(C): 36.7 (13 May 2021 12:07), Max: 37 (13 May 2021 05:50)  T(F): 98 (13 May 2021 12:07), Max: 98.6 (13 May 2021 05:50)  HR: 60 (13 May 2021 12:07) (60 - 66)  BP: 134/74 (13 May 2021 12:07) (134/74 - 140/75)  BP(mean): --  RR: 18 (13 May 2021 12:07) (18 - 18)  SpO2: 100% (13 May 2021 12:07) (97% - 100%)    PHYSICAL EXAM:  Gen- In bed, comfortable, NAD  Eyes- EOMI, PERRLA, nonicteric.  EMNT- Fair dentition. MMM. No tonsilar exudates. No posterior pharynx erythema.  Neck- Supple. No masses. No tracheal deviation.  Resp- CTAB, good effort. No r/r/w. No accessory muscle use.  CVS- RRR, S1S2, no g/r/m. Trace LE edema.  GI- Soft abd, NT, ND, +BSx4. No HSM.  MSK- No C/C. ROM intact. No crepitus.  Neuro- CN II-XII intact. Speech fluent/face symmetric. Sensation intact.  Skin- No rashes/ulcers. Warm/moist.  Psych- AAOx3. Appropriate mood/affect.        MEDICATION:  MEDICATIONS  (STANDING):  allopurinol 100 milliGRAM(s) Oral daily  aspirin enteric coated 81 milliGRAM(s) Oral daily  atorvastatin 80 milliGRAM(s) Oral at bedtime  buMETAnide Injectable 2 milliGRAM(s) IV Push two times a day  chlorhexidine 2% Cloths 1 Application(s) Topical daily  dextrose 40% Gel 15 Gram(s) Oral once  dextrose 5%. 1000 milliLiter(s) (50 mL/Hr) IV Continuous <Continuous>  dextrose 5%. 1000 milliLiter(s) (100 mL/Hr) IV Continuous <Continuous>  dextrose 50% Injectable 25 Gram(s) IV Push once  dextrose 50% Injectable 12.5 Gram(s) IV Push once  dextrose 50% Injectable 25 Gram(s) IV Push once  gabapentin 300 milliGRAM(s) Oral three times a day  glucagon  Injectable 1 milliGRAM(s) IntraMuscular once  heparin   Injectable 5000 Unit(s) SubCutaneous every 8 hours  hydrALAZINE 50 milliGRAM(s) Oral three times a day  insulin glargine Injectable (LANTUS) 55 Unit(s) SubCutaneous at bedtime  insulin lispro (ADMELOG) corrective regimen sliding scale   SubCutaneous three times a day before meals  insulin lispro (ADMELOG) corrective regimen sliding scale   SubCutaneous at bedtime  insulin lispro Injectable (ADMELOG) 22 Unit(s) SubCutaneous three times a day before meals  isosorbide   dinitrate Tablet (ISORDIL) 10 milliGRAM(s) Oral three times a day  metoprolol succinate  milliGRAM(s) Oral two times a day  multivitamin 1 Tablet(s) Oral daily    MEDICATIONS  (PRN):  acetaminophen   Tablet .. 650 milliGRAM(s) Oral every 6 hours PRN Temp greater or equal to 38C (100.4F), Mild Pain (1 - 3), Moderate Pain (4 - 6)  ketotifen 0.025% Ophthalmic Solution 1 Drop(s) Both EYES two times a day PRN Allergic Conjunctivitis        LABORATORY:                          9.9    6.42  )-----------( 179      ( 13 May 2021 07:33 )             29.5     05-13    134<L>  |  89<L>  |  163<H>  ----------------------------<  188<H>  4.1   |  29  |  4.63<H>    Ca    10.0      13 May 2021 07:33  Phos  5.0     05-13  Mg     2.6     05-13      PT/INR - ( 13 May 2021 07:33 )   PT: 11.9 sec;   INR: 1.04 ratio         PTT - ( 13 May 2021 07:33 )  PTT:31.9 sec          COVID-19 PCR: NotDetec (29 Apr 2021 10:24)                     [facility] Division of Hospital Medicine  Chad BROWN KatieElías) MD Michael  Pager [phone]    SUBJECTIVE:  Follow up for CHF.    Pt seen and evaluated at bedside this AM. No o/n events. Denies any SOb/Cp/NV. Tolerating placement of Shiley this AM.      ROS: All systems negative except as noted.    Vital Signs Last 24 Hrs  T(C): 36.7 (13 May 2021 12:07), Max: 37 (13 May 2021 05:50)  T(F): 98 (13 May 2021 12:07), Max: 98.6 (13 May 2021 05:50)  HR: 60 (13 May 2021 12:07) (60 - 66)  BP: 134/74 (13 May 2021 12:07) (134/74 - 140/75)  BP(mean): --  RR: 18 (13 May 2021 12:07) (18 - 18)  SpO2: 100% (13 May 2021 12:07) (97% - 100%)    PHYSICAL EXAM:  Gen- In bed, comfortable, NAD  Eyes- EOMI, PERRLA, nonicteric.  EMNT- Fair dentition. MMM. No tonsilar exudates. No posterior pharynx erythema.  Neck- Supple. No masses. No tracheal deviation.  Resp- CTAB, good effort. No r/r/w. No accessory muscle use.  CVS- RRR, S1S2, no g/r/m. Trace LE edema.  GI- Soft abd, NT, ND, +BSx4. No HSM.  MSK- No C/C. ROM intact. No crepitus.  Neuro- CN II-XII intact. Speech fluent/face symmetric. Sensation intact.  Skin- No rashes/ulcers. Warm/moist.  Psych- AAOx3. Appropriate mood/affect.        MEDICATION:  MEDICATIONS  (STANDING):  allopurinol 100 milliGRAM(s) Oral daily  aspirin enteric coated 81 milliGRAM(s) Oral daily  atorvastatin 80 milliGRAM(s) Oral at bedtime  buMETAnide Injectable 2 milliGRAM(s) IV Push two times a day  chlorhexidine 2% Cloths 1 Application(s) Topical daily  dextrose 40% Gel 15 Gram(s) Oral once  dextrose 5%. 1000 milliLiter(s) (50 mL/Hr) IV Continuous <Continuous>  dextrose 5%. 1000 milliLiter(s) (100 mL/Hr) IV Continuous <Continuous>  dextrose 50% Injectable 25 Gram(s) IV Push once  dextrose 50% Injectable 12.5 Gram(s) IV Push once  dextrose 50% Injectable 25 Gram(s) IV Push once  gabapentin 300 milliGRAM(s) Oral three times a day  glucagon  Injectable 1 milliGRAM(s) IntraMuscular once  heparin   Injectable 5000 Unit(s) SubCutaneous every 8 hours  hydrALAZINE 50 milliGRAM(s) Oral three times a day  insulin glargine Injectable (LANTUS) 55 Unit(s) SubCutaneous at bedtime  insulin lispro (ADMELOG) corrective regimen sliding scale   SubCutaneous three times a day before meals  insulin lispro (ADMELOG) corrective regimen sliding scale   SubCutaneous at bedtime  insulin lispro Injectable (ADMELOG) 22 Unit(s) SubCutaneous three times a day before meals  isosorbide   dinitrate Tablet (ISORDIL) 10 milliGRAM(s) Oral three times a day  metoprolol succinate  milliGRAM(s) Oral two times a day  multivitamin 1 Tablet(s) Oral daily    MEDICATIONS  (PRN):  acetaminophen   Tablet .. 650 milliGRAM(s) Oral every 6 hours PRN Temp greater or equal to 38C (100.4F), Mild Pain (1 - 3), Moderate Pain (4 - 6)  ketotifen 0.025% Ophthalmic Solution 1 Drop(s) Both EYES two times a day PRN Allergic Conjunctivitis        LABORATORY:                          9.9    6.42  )-----------( 179      ( 13 May 2021 07:33 )             29.5     05-13    134<L>  |  89<L>  |  163<H>  ----------------------------<  188<H>  4.1   |  29  |  4.63<H>    Ca    10.0      13 May 2021 07:33  Phos  5.0     05-13  Mg     2.6     05-13      PT/INR - ( 13 May 2021 07:33 )   PT: 11.9 sec;   INR: 1.04 ratio         PTT - ( 13 May 2021 07:33 )  PTT:31.9 sec          COVID-19 PCR: NotDetec (29 Apr 2021 10:24)        Telemetry - stable SR.

## 2021-05-13 NOTE — PROGRESS NOTE ADULT - PROBLEM SELECTOR PLAN 1
-Going to get first session of HD today, as d/w renal team will try for some fluid removal today.  -Continue Bumex 2 IV BID.   - continue hydralazine 50 tid with isordil 10 tid  -Continue Toprol 100 mg po BID.   -Keep k 4.0-5.0 and mag 2.0.   -Daily standing weights and strict I/O.

## 2021-05-13 NOTE — PROVIDER CONTACT NOTE (HYPOGLYCEMIA EVENT) - NS PROVIDER CONTACT ASSESS-HYPO
alert , drinking juice , and denies any chills , cold and clamminess , headache or palpitaions at present

## 2021-05-13 NOTE — PROGRESS NOTE ADULT - SUBJECTIVE AND OBJECTIVE BOX
Nephrology Followup Note - [phone] - Dr Thompson / Dr Murdock / Dr Morocho / Dr Kendrick / Dr Magallanes / Dr Alfaro / Dr Escudero / Dr Raymundo  Pt seen and examined at bedside  No complaints. Pt returned from shiley insertion. Denies pain in area.     Allergies:  No Known Allergies    Hospital Medications:   MEDICATIONS  (STANDING):  allopurinol 100 milliGRAM(s) Oral daily  aspirin enteric coated 81 milliGRAM(s) Oral daily  atorvastatin 80 milliGRAM(s) Oral at bedtime  buMETAnide Injectable 2 milliGRAM(s) IV Push two times a day  chlorhexidine 2% Cloths 1 Application(s) Topical daily  dextrose 40% Gel 15 Gram(s) Oral once  dextrose 5%. 1000 milliLiter(s) (50 mL/Hr) IV Continuous <Continuous>  dextrose 5%. 1000 milliLiter(s) (100 mL/Hr) IV Continuous <Continuous>  dextrose 50% Injectable 25 Gram(s) IV Push once  dextrose 50% Injectable 12.5 Gram(s) IV Push once  dextrose 50% Injectable 25 Gram(s) IV Push once  gabapentin 300 milliGRAM(s) Oral three times a day  glucagon  Injectable 1 milliGRAM(s) IntraMuscular once  heparin   Injectable 5000 Unit(s) SubCutaneous every 8 hours  hydrALAZINE 50 milliGRAM(s) Oral three times a day  insulin glargine Injectable (LANTUS) 55 Unit(s) SubCutaneous at bedtime  insulin lispro (ADMELOG) corrective regimen sliding scale   SubCutaneous three times a day before meals  insulin lispro (ADMELOG) corrective regimen sliding scale   SubCutaneous at bedtime  insulin lispro Injectable (ADMELOG) 21 Unit(s) SubCutaneous three times a day before meals  isosorbide   dinitrate Tablet (ISORDIL) 10 milliGRAM(s) Oral three times a day  metoprolol succinate  milliGRAM(s) Oral two times a day  multivitamin 1 Tablet(s) Oral daily    VITALS:  T(F): 97.9 (05-13-21 @ 15:40), Max: 98.6 (05-13-21 @ 05:50)  HR: 64 (05-13-21 @ 15:40)  BP: 123/73 (05-13-21 @ 15:40)  RR: 16 (05-13-21 @ 15:40)  SpO2: 100% (05-13-21 @ 12:07)  Wt(kg): --    05-12 @ 07:01  -  05-13 @ 07:00  --------------------------------------------------------  IN: 690 mL / OUT: 1000 mL / NET: -310 mL        PHYSICAL EXAM:  Constitutional: NAD  HEENT: anicteric sclera, oropharynx clear, MMM  Neck: No JVD  Respiratory: CTAB, no wheezes, rales or rhonchi  Cardiovascular: S1, S2, RRR  Gastrointestinal: BS+, soft, NT/ND  Extremities: No cyanosis or clubbing. +peripheral edema  Neurological: A/O x 3, no focal deficits  Psychiatric: Normal mood, normal affect  : No CVA tenderness. No paredes.   Skin: No rashes  Vascular Access: [facility] juvencio cath.     LABS:  05-13    134<L>  |  89<L>  |  163<H>  ----------------------------<  188<H>  4.1   |  29  |  4.63<H>    Ca    10.0      13 May 2021 07:33  Phos  5.0     05-13  Mg     2.6     05-13      Creatinine Trend: 4.63 <--, 4.94 <--, 4.72 <--, 4.79 <--, 4.80 <--, 4.17 <--, 3.80 <--                        9.9    6.42  )-----------( 179      ( 13 May 2021 07:33 )             29.5     Urine Studies:      RADIOLOGY & ADDITIONAL STUDIES:

## 2021-05-13 NOTE — PROGRESS NOTE ADULT - SUBJECTIVE AND OBJECTIVE BOX
Chief Complaint: DM2    History: tolerating po  had glucose 78 yesterday evening  reports eating salad only with no carb at lunch yesterday    MEDICATIONS  (STANDING):  allopurinol 100 milliGRAM(s) Oral daily  aspirin enteric coated 81 milliGRAM(s) Oral daily  atorvastatin 80 milliGRAM(s) Oral at bedtime  buMETAnide Injectable 2 milliGRAM(s) IV Push two times a day  chlorhexidine 2% Cloths 1 Application(s) Topical daily  dextrose 40% Gel 15 Gram(s) Oral once  dextrose 5%. 1000 milliLiter(s) (50 mL/Hr) IV Continuous <Continuous>  dextrose 5%. 1000 milliLiter(s) (100 mL/Hr) IV Continuous <Continuous>  dextrose 50% Injectable 25 Gram(s) IV Push once  dextrose 50% Injectable 12.5 Gram(s) IV Push once  dextrose 50% Injectable 25 Gram(s) IV Push once  gabapentin 300 milliGRAM(s) Oral three times a day  glucagon  Injectable 1 milliGRAM(s) IntraMuscular once  heparin   Injectable 5000 Unit(s) SubCutaneous every 8 hours  hydrALAZINE 50 milliGRAM(s) Oral three times a day  insulin glargine Injectable (LANTUS) 55 Unit(s) SubCutaneous at bedtime  insulin lispro (ADMELOG) corrective regimen sliding scale   SubCutaneous three times a day before meals  insulin lispro (ADMELOG) corrective regimen sliding scale   SubCutaneous at bedtime  insulin lispro Injectable (ADMELOG) 22 Unit(s) SubCutaneous three times a day before meals  isosorbide   dinitrate Tablet (ISORDIL) 10 milliGRAM(s) Oral three times a day  metoprolol succinate  milliGRAM(s) Oral two times a day  multivitamin 1 Tablet(s) Oral daily    MEDICATIONS  (PRN):  acetaminophen   Tablet .. 650 milliGRAM(s) Oral every 6 hours PRN Temp greater or equal to 38C (100.4F), Mild Pain (1 - 3), Moderate Pain (4 - 6)  ketotifen 0.025% Ophthalmic Solution 1 Drop(s) Both EYES two times a day PRN Allergic Conjunctivitis      Allergies    No Known Allergies    Intolerances      Review of Systems:  ALL OTHER SYSTEMS REVIEWED AND NEGATIVE      PHYSICAL EXAM:  VITALS: T(C): 36.7 (05-13-21 @ 12:07)  T(F): 98 (05-13-21 @ 12:07), Max: 98.6 (05-13-21 @ 05:50)  HR: 60 (05-13-21 @ 12:07) (60 - 66)  BP: 134/74 (05-13-21 @ 12:07) (134/74 - 140/75)  RR:  (18 - 18)  SpO2:  (97% - 100%)  Wt(kg): --  GENERAL: NAD, well-groomed, well-developed  EYES: No proptosis, no lid lag, anicteric  HEENT:  Atraumatic, Normocephalic, moist mucous membranes  RESPIRATORY: nonlabored respirations, no wheezing  PSYCH: Alert and oriented x 3, normal affect, normal mood    CAPILLARY BLOOD GLUCOSE      POCT Blood Glucose.: 141 mg/dL (13 May 2021 12:23)  POCT Blood Glucose.: 183 mg/dL (13 May 2021 09:17)  POCT Blood Glucose.: 126 mg/dL (12 May 2021 22:58)  POCT Blood Glucose.: 78 mg/dL (12 May 2021 21:45)  POCT Blood Glucose.: 121 mg/dL (12 May 2021 16:53)      05-13    134<L>  |  89<L>  |  163<H>  ----------------------------<  188<H>  4.1   |  29  |  4.63<H>    EGFR if : 10<L>  EGFR if non : 9<L>    Ca    10.0      05-13  Mg     2.6     05-13  Phos  5.0     05-13        A1C with Estimated Average Glucose Result: 6.8 % (04-30-21 @ 07:41)      Thyroid Function Tests:  04-30 @ 07:41 TSH 2.19 FreeT4 -- T3 -- Anti TPO -- Anti Thyroglobulin Ab -- TSI --

## 2021-05-13 NOTE — PROVIDER CONTACT NOTE (HYPOGLYCEMIA EVENT) - NS PROVIDER CONTACT SITUATION-HYPO
patient blood sugar is 60, patient is aox4 , denies any symptoms of hypoglycemia , patient had juice and FS is slowly rising up

## 2021-05-13 NOTE — PROGRESS NOTE ADULT - ASSESSMENT
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 Endocrine consulted for uncontrolled DM2 with hyperglycemia inpatient.    Problem/Recommendation - 1:  Problem: Type 2 diabetes mellitus with hyperglycemia, with long-term current use of insulin. Recommendation: T2DM with hgb a1c of 6.8% with FS tightly controlled at home but on unconventional regimen of BID NPH and regular insulin, states it may have been due to cost.   -Inpatient BG target 100-180 mg/dl, currently glucose improved but one borderline low glucose yesterday evening.  -Continue Lantus 55 units qhs   -Slightly reduce Admelog to 21 units tidac   -Counselled patient on carb consistent diet to avoid eating a meal with no carb component while on insulin regimen which would likely lead to glucose fluctuations.  -Continue Admelog moderate dose correction scale tidac and moderate qhs scale    Discharge   B/B insulin as per insurance if cost of pen is reasonable, if not then can use novolin 70/30 pen or vial, finalize before discharge.  Can send scripts for basal and bolus insulin pens to assess for cost.    Would benefit from Endocrinologist f/u   Dr. Lino   [phone]  [address] [facility] [address] [facility], [place] [address].    Problem/Recommendation - 2:  ·  Problem: Other hyperlipidemia.  Recommendation: LDL 72 on lipitor.     Problem/Recommendation - 3:  ·  Problem: Essential hypertension.  Recommendation: Not at goal of ,130/80 on toprol, isosorbide, hydralazine, metoprolol, bumex    Problem/Recommendation - 4:  ·  Problem: Thyroid nodule.  Recommendation: RLP hypoechoic nodule 13mm and LLP isoechoic nodule 1.9 cm. Both benign according to pt in the past but would need records. Can follow up outpatient.    Frances Lynch MD  Division of Endocrinology  Pager: [phone]    If after 6PM or before 9AM, or on weekends/holidays, please call endocrine answering service for assistance ([phone]).  For nonurgent matters email [email or web address] for assistance.

## 2021-05-13 NOTE — PROGRESS NOTE ADULT - SUBJECTIVE AND OBJECTIVE BOX
Medications:  acetaminophen   Tablet .. 650 milliGRAM(s) Oral every 6 hours PRN  allopurinol 100 milliGRAM(s) Oral daily  aspirin enteric coated 81 milliGRAM(s) Oral daily  atorvastatin 80 milliGRAM(s) Oral at bedtime  buMETAnide Injectable 2 milliGRAM(s) IV Push two times a day  chlorhexidine 2% Cloths 1 Application(s) Topical daily  dextrose 40% Gel 15 Gram(s) Oral once  dextrose 5%. 1000 milliLiter(s) IV Continuous <Continuous>  dextrose 5%. 1000 milliLiter(s) IV Continuous <Continuous>  dextrose 50% Injectable 25 Gram(s) IV Push once  dextrose 50% Injectable 12.5 Gram(s) IV Push once  dextrose 50% Injectable 25 Gram(s) IV Push once  gabapentin 300 milliGRAM(s) Oral three times a day  glucagon  Injectable 1 milliGRAM(s) IntraMuscular once  heparin   Injectable 5000 Unit(s) SubCutaneous every 8 hours  hydrALAZINE 50 milliGRAM(s) Oral three times a day  insulin glargine Injectable (LANTUS) 55 Unit(s) SubCutaneous at bedtime  insulin lispro (ADMELOG) corrective regimen sliding scale   SubCutaneous three times a day before meals  insulin lispro (ADMELOG) corrective regimen sliding scale   SubCutaneous at bedtime  insulin lispro Injectable (ADMELOG) 22 Unit(s) SubCutaneous three times a day before meals  isosorbide   dinitrate Tablet (ISORDIL) 10 milliGRAM(s) Oral three times a day  ketotifen 0.025% Ophthalmic Solution 1 Drop(s) Both EYES two times a day PRN  metoprolol succinate  milliGRAM(s) Oral two times a day  multivitamin 1 Tablet(s) Oral daily      Vitals:  Vital Signs Last 24 Hrs  T(C): 37 (13 May 2021 05:50), Max: 37 (13 May 2021 05:50)  T(F): 98.6 (13 May 2021 05:50), Max: 98.6 (13 May 2021 05:50)  HR: 66 (13 May 2021 05:50) (62 - 66)  BP: 137/61 (13 May 2021 05:50) (137/61 - 140/75)  BP(mean): --  RR: 18 (13 May 2021 05:50) (18 - 18)  SpO2: 97% (13 May 2021 05:50) (97% - 97%)    Daily     Daily     I&O's Detail    12 May 2021 07:01  -  13 May 2021 07:00  --------------------------------------------------------  IN:    Oral Fluid: 690 mL  Total IN: 690 mL    OUT:    Voided (mL): 1000 mL  Total OUT: 1000 mL    Total NET: -310 mL          Physical Exam:     General: No distress. Comfortable.  HEENT: EOM intact.  Neck: Neck supple. JVP not elevated. No masses  Chest: Clear to auscultation bilaterally  CV: Normal S1 and S2. No murmurs, rub, or gallops. Radial pulses normal.  Abdomen: Soft, non-distended, non-tender  Skin: No rashes or skin breakdown  Neurology: Alert and oriented times three. Sensation intact  Psych: Affect normal    Labs:                        9.9    6.42  )-----------( 179      ( 13 May 2021 07:33 )             29.5     05-13    134<L>  |  89<L>  |  163<H>  ----------------------------<  188<H>  4.1   |  29  |  4.63<H>    Ca    10.0      13 May 2021 07:33  Phos  5.0     05-13  Mg     2.6     05-13      PT/INR - ( 13 May 2021 07:33 )   PT: 11.9 sec;   INR: 1.04 ratio         PTT - ( 13 May 2021 07:33 )  PTT:31.9 sec       Patient seen and examined. [facility]   No SOB at rest, CP, palpitations.       Medications:  acetaminophen   Tablet .. 650 milliGRAM(s) Oral every 6 hours PRN  allopurinol 100 milliGRAM(s) Oral daily  aspirin enteric coated 81 milliGRAM(s) Oral daily  atorvastatin 80 milliGRAM(s) Oral at bedtime  buMETAnide Injectable 2 milliGRAM(s) IV Push two times a day  chlorhexidine 2% Cloths 1 Application(s) Topical daily  dextrose 40% Gel 15 Gram(s) Oral once  dextrose 5%. 1000 milliLiter(s) IV Continuous <Continuous>  dextrose 5%. 1000 milliLiter(s) IV Continuous <Continuous>  dextrose 50% Injectable 25 Gram(s) IV Push once  dextrose 50% Injectable 12.5 Gram(s) IV Push once  dextrose 50% Injectable 25 Gram(s) IV Push once  gabapentin 300 milliGRAM(s) Oral three times a day  glucagon  Injectable 1 milliGRAM(s) IntraMuscular once  heparin   Injectable 5000 Unit(s) SubCutaneous every 8 hours  hydrALAZINE 50 milliGRAM(s) Oral three times a day  insulin glargine Injectable (LANTUS) 55 Unit(s) SubCutaneous at bedtime  insulin lispro (ADMELOG) corrective regimen sliding scale   SubCutaneous three times a day before meals  insulin lispro (ADMELOG) corrective regimen sliding scale   SubCutaneous at bedtime  insulin lispro Injectable (ADMELOG) 22 Unit(s) SubCutaneous three times a day before meals  isosorbide   dinitrate Tablet (ISORDIL) 10 milliGRAM(s) Oral three times a day  ketotifen 0.025% Ophthalmic Solution 1 Drop(s) Both EYES two times a day PRN  metoprolol succinate  milliGRAM(s) Oral two times a day  multivitamin 1 Tablet(s) Oral daily      Vitals:  Vital Signs Last 24 Hrs  T(C): 37 (13 May 2021 05:50), Max: 37 (13 May 2021 05:50)  T(F): 98.6 (13 May 2021 05:50), Max: 98.6 (13 May 2021 05:50)  HR: 66 (13 May 2021 05:50) (62 - 66)  BP: 137/61 (13 May 2021 05:50) (137/61 - 140/75)  BP(mean): --  RR: 18 (13 May 2021 05:50) (18 - 18)  SpO2: 97% (13 May 2021 05:50) (97% - 97%)    Daily     Daily     I&O's Detail    12 May 2021 07:01  -  13 May 2021 07:00  --------------------------------------------------------  IN:    Oral Fluid: 690 mL  Total IN: 690 mL    OUT:    Voided (mL): 1000 mL  Total OUT: 1000 mL    Total NET: -310 mL          Physical Exam:     General: No distress. Comfortable.  HEENT: EOM intact.  Neck: Neck supple. JVP mildly elevated. No masses  Chest: Clear to auscultation bilaterally  CV: Normal S1 and S2. No murmurs, rub, or gallops. Radial pulses normal. 1+ tight ankle edema b/l. Warm b/l.   Abdomen: Soft, non-distended, non-tender  Skin: No rashes or skin breakdown  Neurology: Alert and oriented times three. Sensation intact  Psych: Affect normal    Labs:                        9.9    6.42  )-----------( 179      ( 13 May 2021 07:33 )             29.5     05-13    134<L>  |  89<L>  |  163<H>  ----------------------------<  188<H>  4.1   |  29  |  4.63<H>    Ca    10.0      13 May 2021 07:33  Phos  5.0     05-13  Mg     2.6     05-13      PT/INR - ( 13 May 2021 07:33 )   PT: 11.9 sec;   INR: 1.04 ratio         PTT - ( 13 May 2021 07:33 )  PTT:31.9 sec

## 2021-05-14 LAB
ANION GAP SERPL CALC-SCNC: 16 MMOL/L — HIGH (ref 7–14)
BUN SERPL-MCNC: 119 MG/DL — HIGH (ref 7–23)
CALCIUM SERPL-MCNC: 9.9 MG/DL — SIGNIFICANT CHANGE UP (ref 8.4–10.5)
CHLORIDE SERPL-SCNC: 92 MMOL/L — LOW (ref 98–107)
CO2 SERPL-SCNC: 27 MMOL/L — SIGNIFICANT CHANGE UP (ref 22–31)
CREAT SERPL-MCNC: 4.39 MG/DL — HIGH (ref 0.5–1.3)
GLUCOSE BLDC GLUCOMTR-MCNC: 111 MG/DL — HIGH (ref 70–99)
GLUCOSE BLDC GLUCOMTR-MCNC: 130 MG/DL — HIGH (ref 70–99)
GLUCOSE BLDC GLUCOMTR-MCNC: 206 MG/DL — HIGH (ref 70–99)
GLUCOSE BLDC GLUCOMTR-MCNC: 220 MG/DL — HIGH (ref 70–99)
GLUCOSE SERPL-MCNC: 255 MG/DL — HIGH (ref 70–99)
HCT VFR BLD CALC: 31.1 % — LOW (ref 34.5–45)
HGB BLD-MCNC: 10.1 G/DL — LOW (ref 11.5–15.5)
MAGNESIUM SERPL-MCNC: 2.5 MG/DL — SIGNIFICANT CHANGE UP (ref 1.6–2.6)
MCHC RBC-ENTMCNC: 28.4 PG — SIGNIFICANT CHANGE UP (ref 27–34)
MCHC RBC-ENTMCNC: 32.5 GM/DL — SIGNIFICANT CHANGE UP (ref 32–36)
MCV RBC AUTO: 87.4 FL — SIGNIFICANT CHANGE UP (ref 80–100)
NRBC # BLD: 0 /100 WBCS — SIGNIFICANT CHANGE UP
NRBC # FLD: 0 K/UL — SIGNIFICANT CHANGE UP
PHOSPHATE SERPL-MCNC: 5.3 MG/DL — HIGH (ref 2.5–4.5)
PLATELET # BLD AUTO: 178 K/UL — SIGNIFICANT CHANGE UP (ref 150–400)
POTASSIUM SERPL-MCNC: 4.3 MMOL/L — SIGNIFICANT CHANGE UP (ref 3.5–5.3)
POTASSIUM SERPL-SCNC: 4.3 MMOL/L — SIGNIFICANT CHANGE UP (ref 3.5–5.3)
RBC # BLD: 3.56 M/UL — LOW (ref 3.8–5.2)
RBC # FLD: 13.8 % — SIGNIFICANT CHANGE UP (ref 10.3–14.5)
SODIUM SERPL-SCNC: 135 MMOL/L — SIGNIFICANT CHANGE UP (ref 135–145)
WBC # BLD: 4.35 K/UL — SIGNIFICANT CHANGE UP (ref 3.8–10.5)
WBC # FLD AUTO: 4.35 K/UL — SIGNIFICANT CHANGE UP (ref 3.8–10.5)

## 2021-05-14 PROCEDURE — 99233 SBSQ HOSP IP/OBS HIGH 50: CPT

## 2021-05-14 PROCEDURE — 99232 SBSQ HOSP IP/OBS MODERATE 35: CPT

## 2021-05-14 RX ADMIN — Medication 100 MILLIGRAM(S): at 06:02

## 2021-05-14 RX ADMIN — Medication 50 MILLIGRAM(S): at 06:02

## 2021-05-14 RX ADMIN — Medication 81 MILLIGRAM(S): at 11:30

## 2021-05-14 RX ADMIN — ISOSORBIDE DINITRATE 10 MILLIGRAM(S): 5 TABLET ORAL at 06:02

## 2021-05-14 RX ADMIN — Medication 21 UNIT(S): at 08:47

## 2021-05-14 RX ADMIN — CHLORHEXIDINE GLUCONATE 1 APPLICATION(S): 213 SOLUTION TOPICAL at 11:30

## 2021-05-14 RX ADMIN — Medication 1 TABLET(S): at 11:30

## 2021-05-14 RX ADMIN — INSULIN GLARGINE 55 UNIT(S): 100 INJECTION, SOLUTION SUBCUTANEOUS at 21:21

## 2021-05-14 RX ADMIN — GABAPENTIN 300 MILLIGRAM(S): 400 CAPSULE ORAL at 06:02

## 2021-05-14 RX ADMIN — Medication 4: at 08:47

## 2021-05-14 RX ADMIN — BUMETANIDE 2 MILLIGRAM(S): 0.25 INJECTION INTRAMUSCULAR; INTRAVENOUS at 21:23

## 2021-05-14 RX ADMIN — ATORVASTATIN CALCIUM 80 MILLIGRAM(S): 80 TABLET, FILM COATED ORAL at 21:23

## 2021-05-14 RX ADMIN — Medication 100 MILLIGRAM(S): at 11:30

## 2021-05-14 RX ADMIN — Medication 21 UNIT(S): at 21:22

## 2021-05-14 RX ADMIN — Medication 50 MILLIGRAM(S): at 21:23

## 2021-05-14 RX ADMIN — GABAPENTIN 300 MILLIGRAM(S): 400 CAPSULE ORAL at 14:21

## 2021-05-14 RX ADMIN — ISOSORBIDE DINITRATE 10 MILLIGRAM(S): 5 TABLET ORAL at 11:31

## 2021-05-14 RX ADMIN — Medication 4: at 12:34

## 2021-05-14 RX ADMIN — HEPARIN SODIUM 5000 UNIT(S): 5000 INJECTION INTRAVENOUS; SUBCUTANEOUS at 06:02

## 2021-05-14 RX ADMIN — HEPARIN SODIUM 5000 UNIT(S): 5000 INJECTION INTRAVENOUS; SUBCUTANEOUS at 14:21

## 2021-05-14 RX ADMIN — HEPARIN SODIUM 5000 UNIT(S): 5000 INJECTION INTRAVENOUS; SUBCUTANEOUS at 21:34

## 2021-05-14 RX ADMIN — Medication 21 UNIT(S): at 12:34

## 2021-05-14 RX ADMIN — BUMETANIDE 2 MILLIGRAM(S): 0.25 INJECTION INTRAMUSCULAR; INTRAVENOUS at 06:02

## 2021-05-14 RX ADMIN — GABAPENTIN 300 MILLIGRAM(S): 400 CAPSULE ORAL at 21:23

## 2021-05-14 RX ADMIN — Medication 100 MILLIGRAM(S): at 21:23

## 2021-05-14 NOTE — PROGRESS NOTE ADULT - PROBLEM SELECTOR PLAN 1
Pt with CKD3, follows with Dr Escudero in outpt clinic.   Baseline Cr ~2 from earlier this year  Worsening renal failure, planning to start on HD.   No  obstruction on renal US. No significant proteinuria on UA. BP stable, no obvious nephrotoxin.   s/p shiley cath placement, uneventful first HD yesterday.  repeat treatment today and tomorrow as well.  Gradually increase UF goal with subsequent treatments to optimize fluid status.   Pt still markedly volume overloaded.   monitor daily BMP, strict I/O.   dose all meds for eGFR<15ml/min.   avoid ACEi/ARB/NSAIDs/Nephrotoxics.

## 2021-05-14 NOTE — PROGRESS NOTE ADULT - SUBJECTIVE AND OBJECTIVE BOX
Chief Complaint: DM2    History: hypoglycemia event yesterday due to not eating lunch because it was not appetizing but did receive premeal insulin dose for lunch.  Today she is eating lunch. Denies new complaints.  Cost of basal and bolus pens is very high and not workable for discharge plan.    MEDICATIONS  (STANDING):  allopurinol 100 milliGRAM(s) Oral daily  aspirin enteric coated 81 milliGRAM(s) Oral daily  atorvastatin 80 milliGRAM(s) Oral at bedtime  buMETAnide Injectable 2 milliGRAM(s) IV Push two times a day  chlorhexidine 2% Cloths 1 Application(s) Topical daily  dextrose 40% Gel 15 Gram(s) Oral once  dextrose 5%. 1000 milliLiter(s) (50 mL/Hr) IV Continuous <Continuous>  dextrose 5%. 1000 milliLiter(s) (100 mL/Hr) IV Continuous <Continuous>  dextrose 50% Injectable 25 Gram(s) IV Push once  dextrose 50% Injectable 12.5 Gram(s) IV Push once  dextrose 50% Injectable 25 Gram(s) IV Push once  gabapentin 300 milliGRAM(s) Oral three times a day  glucagon  Injectable 1 milliGRAM(s) IntraMuscular once  heparin   Injectable 5000 Unit(s) SubCutaneous every 8 hours  hydrALAZINE 50 milliGRAM(s) Oral three times a day  insulin glargine Injectable (LANTUS) 55 Unit(s) SubCutaneous at bedtime  insulin lispro (ADMELOG) corrective regimen sliding scale   SubCutaneous three times a day before meals  insulin lispro (ADMELOG) corrective regimen sliding scale   SubCutaneous at bedtime  insulin lispro Injectable (ADMELOG) 21 Unit(s) SubCutaneous three times a day before meals  isosorbide   dinitrate Tablet (ISORDIL) 10 milliGRAM(s) Oral three times a day  metoprolol succinate  milliGRAM(s) Oral two times a day  multivitamin 1 Tablet(s) Oral daily    MEDICATIONS  (PRN):  acetaminophen   Tablet .. 650 milliGRAM(s) Oral every 6 hours PRN Temp greater or equal to 38C (100.4F), Mild Pain (1 - 3), Moderate Pain (4 - 6)  ketotifen 0.025% Ophthalmic Solution 1 Drop(s) Both EYES two times a day PRN Allergic Conjunctivitis      Allergies    No Known Allergies    Intolerances      Review of Systems:  ALL OTHER SYSTEMS REVIEWED AND NEGATIVE    PHYSICAL EXAM:  VITALS: T(C): 36.5 (05-14-21 @ 11:19)  T(F): 97.7 (05-14-21 @ 11:19), Max: 98 (05-14-21 @ 06:00)  HR: 65 (05-14-21 @ 11:19) (63 - 81)  BP: 122/60 (05-14-21 @ 11:19) (122/60 - 150/73)  RR:  (16 - 18)  SpO2:  (98% - 100%)  Wt(kg): --  GENERAL: NAD, well-groomed, well-developed  EYES: No proptosis, no lid lag, anicteric  HEENT:  Atraumatic, Normocephalic, moist mucous membranes  RESPIRATORY: nonlabored respirations, no wheezing  PSYCH: Alert and oriented x 3, normal affect, normal mood    CAPILLARY BLOOD GLUCOSE      POCT Blood Glucose.: 220 mg/dL (14 May 2021 12:24)  POCT Blood Glucose.: 206 mg/dL (14 May 2021 08:43)  POCT Blood Glucose.: 266 mg/dL (13 May 2021 21:18)  POCT Blood Glucose.: 159 mg/dL (13 May 2021 18:24)  POCT Blood Glucose.: 107 mg/dL (13 May 2021 17:42)  POCT Blood Glucose.: 88 mg/dL (13 May 2021 17:17)  POCT Blood Glucose.: 70 mg/dL (13 May 2021 16:56)  POCT Blood Glucose.: 60 mg/dL (13 May 2021 16:44)      05-14    135  |  92<L>  |  119<H>  ----------------------------<  255<H>  4.3   |  27  |  4.39<H>    EGFR if : 11<L>  EGFR if non : 9<L>    Ca    9.9      05-14  Mg     2.5     05-14  Phos  5.3     05-14        A1C with Estimated Average Glucose Result: 6.8 % (04-30-21 @ 07:41)      Thyroid Function Tests:  04-30 @ 07:41 TSH 2.19 FreeT4 -- T3 -- Anti TPO -- Anti Thyroglobulin Ab -- TSI --

## 2021-05-14 NOTE — PROGRESS NOTE ADULT - ASSESSMENT
71 y.o. Female w/ hx HTN, DM2 with neuropathy, Recently diagnosed CKD, PAD s/p angiogram RLE (pt denies any stents, just "clearing out"), HLD, and Thyroid nodules (benign biopsy x2 as per patient) p/w worsening SOB, LE edema, orthopnea, weight gain, and increasing abdominal girth likely 2/2 CHF exacerbation. Also with complaints of new intermittent double vision (occasionally when watching TV, does not occur otherwise) and new dysphagia intermittently. Started on diuresis, eventually placed on bumex drip. Cardio and nephro on board. Cr worsening and not improving. Decision made to start pt on HD. Cedric placed on 5/13. Started on HD Remains hospitalized pending further optimization.

## 2021-05-14 NOTE — PROGRESS NOTE ADULT - SUBJECTIVE AND OBJECTIVE BOX
Medications:  acetaminophen   Tablet .. 650 milliGRAM(s) Oral every 6 hours PRN  allopurinol 100 milliGRAM(s) Oral daily  aspirin enteric coated 81 milliGRAM(s) Oral daily  atorvastatin 80 milliGRAM(s) Oral at bedtime  buMETAnide Injectable 2 milliGRAM(s) IV Push two times a day  chlorhexidine 2% Cloths 1 Application(s) Topical daily  dextrose 40% Gel 15 Gram(s) Oral once  dextrose 5%. 1000 milliLiter(s) IV Continuous <Continuous>  dextrose 5%. 1000 milliLiter(s) IV Continuous <Continuous>  dextrose 50% Injectable 25 Gram(s) IV Push once  dextrose 50% Injectable 12.5 Gram(s) IV Push once  dextrose 50% Injectable 25 Gram(s) IV Push once  gabapentin 300 milliGRAM(s) Oral three times a day  glucagon  Injectable 1 milliGRAM(s) IntraMuscular once  heparin   Injectable 5000 Unit(s) SubCutaneous every 8 hours  hydrALAZINE 50 milliGRAM(s) Oral three times a day  insulin glargine Injectable (LANTUS) 55 Unit(s) SubCutaneous at bedtime  insulin lispro (ADMELOG) corrective regimen sliding scale   SubCutaneous three times a day before meals  insulin lispro (ADMELOG) corrective regimen sliding scale   SubCutaneous at bedtime  insulin lispro Injectable (ADMELOG) 21 Unit(s) SubCutaneous three times a day before meals  isosorbide   dinitrate Tablet (ISORDIL) 10 milliGRAM(s) Oral three times a day  ketotifen 0.025% Ophthalmic Solution 1 Drop(s) Both EYES two times a day PRN  metoprolol succinate  milliGRAM(s) Oral two times a day  multivitamin 1 Tablet(s) Oral daily      Vitals:  Vital Signs Last 24 Hrs  T(C): 36.5 (14 May 2021 11:19), Max: 36.7 (13 May 2021 12:07)  T(F): 97.7 (14 May 2021 11:19), Max: 98 (13 May 2021 12:07)  HR: 65 (14 May 2021 11:19) (60 - 81)  BP: 122/60 (14 May 2021 11:19) (122/60 - 150/73)  BP(mean): --  RR: 18 (14 May 2021 11:19) (16 - 18)  SpO2: 100% (14 May 2021 11:19) (98% - 100%)    Daily     Daily Weight in k.2 (13 May 2021 21:30)    I&O's Detail    13 May 2021 07:01  -  14 May 2021 07:00  --------------------------------------------------------  IN:    Oral Fluid: 480 mL    Other (mL): 500 mL  Total IN: 980 mL    OUT:    Other (mL): 1000 mL    Voided (mL): 900 mL  Total OUT: 1900 mL    Total NET: -920 mL          Physical Exam:     General: No distress. Comfortable.  HEENT: EOM intact.  Neck: Neck supple. JVP not elevated. No masses  Chest: Clear to auscultation bilaterally  CV: Normal S1 and S2. No murmurs, rub, or gallops. Radial pulses normal.  Abdomen: Soft, non-distended, non-tender  Skin: No rashes or skin breakdown  Neurology: Alert and oriented times three. Sensation intact  Psych: Affect normal    Labs:                        10.1   4.35  )-----------( 178      ( 14 May 2021 06:36 )             31.1     05-14    135  |  92<L>  |  119<H>  ----------------------------<  255<H>  4.3   |  27  |  4.39<H>    Ca    9.9      14 May 2021 06:36  Phos  5.3     05-14  Mg     2.5     05-14      PT/INR - ( 13 May 2021 07:33 )   PT: 11.9 sec;   INR: 1.04 ratio         PTT - ( 13 May 2021 07:33 )  PTT:31.9 sec       Patient seen and examined. She had her first session of HD yesterday, 500 ml fluid removed. She feels ok- no SOB at rest, BREEN, CP, palpitations.       Medications:  acetaminophen   Tablet .. 650 milliGRAM(s) Oral every 6 hours PRN  allopurinol 100 milliGRAM(s) Oral daily  aspirin enteric coated 81 milliGRAM(s) Oral daily  atorvastatin 80 milliGRAM(s) Oral at bedtime  buMETAnide Injectable 2 milliGRAM(s) IV Push two times a day  chlorhexidine 2% Cloths 1 Application(s) Topical daily  dextrose 40% Gel 15 Gram(s) Oral once  dextrose 5%. 1000 milliLiter(s) IV Continuous <Continuous>  dextrose 5%. 1000 milliLiter(s) IV Continuous <Continuous>  dextrose 50% Injectable 25 Gram(s) IV Push once  dextrose 50% Injectable 12.5 Gram(s) IV Push once  dextrose 50% Injectable 25 Gram(s) IV Push once  gabapentin 300 milliGRAM(s) Oral three times a day  glucagon  Injectable 1 milliGRAM(s) IntraMuscular once  heparin   Injectable 5000 Unit(s) SubCutaneous every 8 hours  hydrALAZINE 50 milliGRAM(s) Oral three times a day  insulin glargine Injectable (LANTUS) 55 Unit(s) SubCutaneous at bedtime  insulin lispro (ADMELOG) corrective regimen sliding scale   SubCutaneous three times a day before meals  insulin lispro (ADMELOG) corrective regimen sliding scale   SubCutaneous at bedtime  insulin lispro Injectable (ADMELOG) 21 Unit(s) SubCutaneous three times a day before meals  isosorbide   dinitrate Tablet (ISORDIL) 10 milliGRAM(s) Oral three times a day  ketotifen 0.025% Ophthalmic Solution 1 Drop(s) Both EYES two times a day PRN  metoprolol succinate  milliGRAM(s) Oral two times a day  multivitamin 1 Tablet(s) Oral daily      Vitals:  Vital Signs Last 24 Hrs  T(C): 36.5 (14 May 2021 11:19), Max: 36.7 (13 May 2021 12:07)  T(F): 97.7 (14 May 2021 11:19), Max: 98 (13 May 2021 12:07)  HR: 65 (14 May 2021 11:19) (60 - 81)  BP: 122/60 (14 May 2021 11:19) (122/60 - 150/73)  BP(mean): --  RR: 18 (14 May 2021 11:19) (16 - 18)  SpO2: 100% (14 May 2021 11:19) (98% - 100%)    Daily     Daily Weight in k.2 (13 May 2021 21:30)    I&O's Detail    13 May 2021 07:01  -  14 May 2021 07:00  --------------------------------------------------------  IN:    Oral Fluid: 480 mL    Other (mL): 500 mL  Total IN: 980 mL    OUT:    Other (mL): 1000 mL    Voided (mL): 900 mL  Total OUT: 1900 mL    Total NET: -920 mL          Physical Exam:     General: No distress. Comfortable.  HEENT: EOM intact.  Neck: Neck supple. JVP not elevated. No masses  Chest: Clear to auscultation bilaterally  CV: Normal S1 and S2. No murmurs, rub, or gallops. Radial pulses normal. Trace b/l LE edema.  Abdomen: Soft, non-distended, non-tender  Skin: No rashes or skin breakdown  Neurology: Alert and oriented times three. Sensation intact  Psych: Affect normal    Labs:                        10.1   4.35  )-----------( 178      ( 14 May 2021 06:36 )             31.1     05-14    135  |  92<L>  |  119<H>  ----------------------------<  255<H>  4.3   |  27  |  4.39<H>    Ca    9.9      14 May 2021 06:36  Phos  5.3     05-14  Mg     2.5     05-14      PT/INR - ( 13 May 2021 07:33 )   PT: 11.9 sec;   INR: 1.04 ratio         PTT - ( 13 May 2021 07:33 )  PTT:31.9 sec

## 2021-05-14 NOTE — PROGRESS NOTE ADULT - PROBLEM SELECTOR PLAN 8
DVT ppx - Heparin 5,000u sc tid    Dispo- pending further improvement of renal function volume status, now on HD.  PT eval - anticipating home.

## 2021-05-14 NOTE — PROGRESS NOTE ADULT - PROBLEM SELECTOR PLAN 1
-Had 500 ml fluid removed in HD yesterday.  -Continue Bumex 2 IV BID.   - continue hydralazine 50 tid with isordil 10 tid  -Continue Toprol 100 mg po BID.   -Keep k 4.0-5.0 and mag 2.0.   -Daily standing weights and strict I/O.

## 2021-05-14 NOTE — PROGRESS NOTE ADULT - SUBJECTIVE AND OBJECTIVE BOX
[facility] Division of Hospital Medicine  Chad BROWN KatieElías) MD Michael  Pager [phone]    SUBJECTIVE:  Follow up for CHF.    Pt seen and evaluated at bedside this AM. No o/n events. Denies any SOB/Cp/NV. Tolerating hemodialysis w/o issue. Another session scheduled for today.       ROS: All systems negative except as noted.    Vital Signs Last 24 Hrs  T(C): 36.5 (14 May 2021 11:19), Max: 36.7 (14 May 2021 06:00)  T(F): 97.7 (14 May 2021 11:19), Max: 98 (14 May 2021 06:00)  HR: 65 (14 May 2021 11:19) (63 - 81)  BP: 122/60 (14 May 2021 11:19) (122/60 - 150/73)  BP(mean): --  RR: 18 (14 May 2021 11:19) (16 - 18)  SpO2: 100% (14 May 2021 11:19) (98% - 100%)    PHYSICAL EXAM:  Gen- In bed, comfortable, NAD  Eyes- EOMI, PERRLA, nonicteric.  EMNT- Fair dentition. MMM. No tonsilar exudates. No posterior pharynx erythema.  Neck- Supple. No masses. No tracheal deviation.  Resp- CTAB, good effort. No r/r/w. No accessory muscle use.  CVS- RRR, S1S2, no g/r/m. Trace LE edema.  GI- Soft abd, NT, ND, +BSx4. No HSM.  MSK- No C/C. ROM intact. No crepitus.  Neuro- CN II-XII intact. Speech fluent/face symmetric. Sensation intact.  Skin- No rashes/ulcers. Warm/moist.  Psych- AAOx3. Appropriate mood/affect.        MEDICATION:  MEDICATIONS  (STANDING):  allopurinol 100 milliGRAM(s) Oral daily  aspirin enteric coated 81 milliGRAM(s) Oral daily  atorvastatin 80 milliGRAM(s) Oral at bedtime  buMETAnide Injectable 2 milliGRAM(s) IV Push two times a day  chlorhexidine 2% Cloths 1 Application(s) Topical daily  dextrose 40% Gel 15 Gram(s) Oral once  dextrose 5%. 1000 milliLiter(s) (50 mL/Hr) IV Continuous <Continuous>  dextrose 5%. 1000 milliLiter(s) (100 mL/Hr) IV Continuous <Continuous>  dextrose 50% Injectable 25 Gram(s) IV Push once  dextrose 50% Injectable 12.5 Gram(s) IV Push once  dextrose 50% Injectable 25 Gram(s) IV Push once  gabapentin 300 milliGRAM(s) Oral three times a day  glucagon  Injectable 1 milliGRAM(s) IntraMuscular once  heparin   Injectable 5000 Unit(s) SubCutaneous every 8 hours  hydrALAZINE 50 milliGRAM(s) Oral three times a day  insulin glargine Injectable (LANTUS) 55 Unit(s) SubCutaneous at bedtime  insulin lispro (ADMELOG) corrective regimen sliding scale   SubCutaneous three times a day before meals  insulin lispro (ADMELOG) corrective regimen sliding scale   SubCutaneous at bedtime  insulin lispro Injectable (ADMELOG) 21 Unit(s) SubCutaneous three times a day before meals  isosorbide   dinitrate Tablet (ISORDIL) 10 milliGRAM(s) Oral three times a day  metoprolol succinate  milliGRAM(s) Oral two times a day  multivitamin 1 Tablet(s) Oral daily    MEDICATIONS  (PRN):  acetaminophen   Tablet .. 650 milliGRAM(s) Oral every 6 hours PRN Temp greater or equal to 38C (100.4F), Mild Pain (1 - 3), Moderate Pain (4 - 6)  ketotifen 0.025% Ophthalmic Solution 1 Drop(s) Both EYES two times a day PRN Allergic Conjunctivitis          LABORATORY:                          10.1   4.35  )-----------( 178      ( 14 May 2021 06:36 )             31.1     05-14    135  |  92<L>  |  119<H>  ----------------------------<  255<H>  4.3   |  27  |  4.39<H>    Ca    9.9      14 May 2021 06:36  Phos  5.3     05-14  Mg     2.5     05-14      PT/INR - ( 13 May 2021 07:33 )   PT: 11.9 sec;   INR: 1.04 ratio         PTT - ( 13 May 2021 07:33 )  PTT:31.9 sec          COVID-19 PCR: Jorgetec (29 Apr 2021 10:24)

## 2021-05-14 NOTE — PROGRESS NOTE ADULT - ASSESSMENT
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 Endocrine consulted for uncontrolled DM2 with hyperglycemia inpatient.    Problem/Recommendation - 1:  Problem: Type 2 diabetes mellitus with hyperglycemia, with long-term current use of insulin. Recommendation: T2DM with hgb a1c of 6.8% with FS tightly controlled at home but on unconventional regimen of BID NPH and regular insulin, states it may have been due to cost.   -Inpatient BG target 100-180 mg/dl, hypoglycemia event yesterday due to receiving premeal insulin but then not eating.  Recommend improved communication between patient and RN about whether she is planning to eat a meal before providing premeal insulin coverage.  -Continue Lantus 55 units qhs   -Continue Admelog 21 units tidac, hold if not eating  -Counselled patient on carb consistent diet to avoid eating a meal with no carb component while on insulin regimen which would likely lead to glucose fluctuations.  -Continue Admelog moderate dose correction scale tidac and moderate qhs scale    Discharge   Basal and bolus insulin pens assessed for cost and the cost is too high for the patient.  Will therefore recommend for discharge - Novolin 70/30 vial (from [facility]) with syringes, final doses to be determined before discharge.   As of now tentative dosing would be 60 units before breakfast and 30 units before dinner  Can send scripts for basal and bolus insulin pens to assess for cost.  Patient was previously following with endocrinologist Dr. Sahu and will resume follow up after discharge.    Problem/Recommendation - 2:  ·  Problem: Other hyperlipidemia.  Recommendation: LDL 72 on lipitor.     Problem/Recommendation - 3:  ·  Problem: Essential hypertension.  Recommendation: BP goal <130/80 on toprol, isosorbide, hydralazine, metoprolol, bumex    Problem/Recommendation - 4:  ·  Problem: Thyroid nodule.  Recommendation: RLP hypoechoic nodule 13mm and LLP isoechoic nodule 1.9 cm. Both benign according to pt in the past but would need records. Can follow up outpatient.    Frances Lynch MD  Division of Endocrinology  Pager: [phone]    If after 6PM or before 9AM, or on weekends/holidays, please call endocrine answering service for assistance ([phone]).  For nonurgent matters email [email or web address] for assistance.

## 2021-05-14 NOTE — PROGRESS NOTE ADULT - SUBJECTIVE AND OBJECTIVE BOX
Nephrology Followup Note - [phone] - Dr Thompson / Dr Murdock / Dr Morocho / Dr Kendrick / Dr Magallanes / Dr Alfaro / Dr Escudero / Dr Raymundo  Pt seen and examined  earlier this afternoon  No acute events overnight. No complaints.     Allergies:  No Known Allergies    Hospital Medications:   MEDICATIONS  (STANDING):  allopurinol 100 milliGRAM(s) Oral daily  aspirin enteric coated 81 milliGRAM(s) Oral daily  atorvastatin 80 milliGRAM(s) Oral at bedtime  buMETAnide Injectable 2 milliGRAM(s) IV Push two times a day  chlorhexidine 2% Cloths 1 Application(s) Topical daily  dextrose 40% Gel 15 Gram(s) Oral once  dextrose 5%. 1000 milliLiter(s) (50 mL/Hr) IV Continuous <Continuous>  dextrose 5%. 1000 milliLiter(s) (100 mL/Hr) IV Continuous <Continuous>  dextrose 50% Injectable 25 Gram(s) IV Push once  dextrose 50% Injectable 12.5 Gram(s) IV Push once  dextrose 50% Injectable 25 Gram(s) IV Push once  gabapentin 300 milliGRAM(s) Oral three times a day  glucagon  Injectable 1 milliGRAM(s) IntraMuscular once  heparin   Injectable 5000 Unit(s) SubCutaneous every 8 hours  hydrALAZINE 50 milliGRAM(s) Oral three times a day  insulin glargine Injectable (LANTUS) 55 Unit(s) SubCutaneous at bedtime  insulin lispro (ADMELOG) corrective regimen sliding scale   SubCutaneous three times a day before meals  insulin lispro (ADMELOG) corrective regimen sliding scale   SubCutaneous at bedtime  insulin lispro Injectable (ADMELOG) 21 Unit(s) SubCutaneous three times a day before meals  isosorbide   dinitrate Tablet (ISORDIL) 10 milliGRAM(s) Oral three times a day  metoprolol succinate  milliGRAM(s) Oral two times a day  multivitamin 1 Tablet(s) Oral daily    VITALS:  T(F): 97.8 (05-14-21 @ 15:45), Max: 98 (05-14-21 @ 06:00)  HR: 62 (05-14-21 @ 15:45)  BP: 148/74 (05-14-21 @ 15:45)  RR: 16 (05-14-21 @ 15:45)  SpO2: 100% (05-14-21 @ 11:19)  Wt(kg): --    05-13 @ 07:01  -  05-14 @ 07:00  --------------------------------------------------------  IN: 980 mL / OUT: 1900 mL / NET: -920 mL        PHYSICAL EXAM:  Constitutional: NAD  HEENT: anicteric sclera, oropharynx clear, MMM  Neck: No JVD  Respiratory: CTAB, no wheezes, rales or rhonchi  Cardiovascular: S1, S2, RRR  Gastrointestinal: BS+, soft, NT/ND  Extremities: No cyanosis or clubbing. No peripheral edema  Neurological: A/O x 3, no focal deficits  Psychiatric: Normal mood, normal affect  : No CVA tenderness. No paredes.   Skin: No rashes  Vascular Access: [facility] cath.     LABS:  05-14    135  |  92<L>  |  119<H>  ----------------------------<  255<H>  4.3   |  27  |  4.39<H>    Ca    9.9      14 May 2021 06:36  Phos  5.3     05-14  Mg     2.5     05-14      Creatinine Trend: 4.39 <--, 4.63 <--, 4.94 <--, 4.72 <--, 4.79 <--, 4.80 <--, 4.17 <--                        10.1   4.35  )-----------( 178      ( 14 May 2021 06:36 )             31.1     Urine Studies:      RADIOLOGY & ADDITIONAL STUDIES:

## 2021-05-14 NOTE — PROGRESS NOTE ADULT - PROBLEM SELECTOR PLAN 1
- Acute on chronic HFpEF.  - s/p RHC 5/4 c/w severe pulm HTN  - TTE normal LVEF  - S/p metolazone x2  -continue hydralazine 50 tid, isordil 10 tid, Toprol 100 mg po BID.   -Cont bumex PO - reassess dosing now that pt is on HD.  - 1.5L fluid restriction, daily weights, I/O   -Appreciate HF recs.

## 2021-05-14 NOTE — CHART NOTE - NSCHARTNOTEFT_GEN_A_CORE
Source: Patient [X]       other [X] Medical Chart   Diet rx: Diet, Regular: Consistent Carbohydrate {No Snacks} (CSTCHO)  DASH/TLC {Sodium & Cholesterol Restricted} (DASH) 1500mL Fluid Restriction ([id number])  No Dairy (05-10-21 @ 10:32) [Active]    Pt 72 yo female with PMHx of HTN, HLD, DM2, CKD, PAD s/p angiogram RLE, thyroid nodules, presented with worsening SOB, LE edema - per chart review. Of note Pt with acute on Chronic CHF exacerbation, +RADHA on CKD- New HD s/p R IJ juvencio with IR 5/13, s/p 1st HD 5/13.     At time of visit, Pt appears alert, oriented. Per Pt her appetite good; no chewing or swallowing difficulty; no report of nausea, vomiting or diarrhea @ this time. RDN offered to discuss Renal diet restrictions, but Pt declined. No food related concerns voiced @ present. RDN remains available, Pt made aware.       Pt's height: 62" (4/29)       IBW: 110#+/-10%               Pt's weight: 103 Kg (5/7)   Pertinent Medications: Aspirin, Heparin, Insulin (Admalog), Insulin (Lantus), Lipitor, Multivitamin,   Pertinent Labs:  05-14 Na135 mmol/L Glu 255 mg/dL<H> K+ 4.3 mmol/L Cr  4.39 mg/dL<H>  mg/dL<H> 05-14 Phos 5.3 mg/dL<H> 04-30 Chol 132 mg/dL LDL --    HDL 37 mg/dL<L> Trig 117 mg/dL  Skin: per chart review Pt with edema 2+: r/l leg, r/l ankle, r/l foot; 1+ generalized  Estimated Needs: [X] no change since previous assessment    Nutrition Interventions/Recommendations:   1. PO diet rx: Renal Replacement (no prot restr, no conc K, no conc phos, low sodium); Consistent Carbohydrate (no snacks); Fluid Restriction per MD discretion;  2. Encourage & assist Pt with meals; Monitor PO diet tolerance; Honor food preferences;   3. Monitor labs, weekly weights, hydration status;

## 2021-05-15 LAB
ANION GAP SERPL CALC-SCNC: 12 MMOL/L — SIGNIFICANT CHANGE UP (ref 7–14)
BUN SERPL-MCNC: 86 MG/DL — HIGH (ref 7–23)
CALCIUM SERPL-MCNC: 9.9 MG/DL — SIGNIFICANT CHANGE UP (ref 8.4–10.5)
CHLORIDE SERPL-SCNC: 92 MMOL/L — LOW (ref 98–107)
CO2 SERPL-SCNC: 28 MMOL/L — SIGNIFICANT CHANGE UP (ref 22–31)
CREAT SERPL-MCNC: 3.84 MG/DL — HIGH (ref 0.5–1.3)
GLUCOSE BLDC GLUCOMTR-MCNC: 114 MG/DL — HIGH (ref 70–99)
GLUCOSE BLDC GLUCOMTR-MCNC: 133 MG/DL — HIGH (ref 70–99)
GLUCOSE BLDC GLUCOMTR-MCNC: 140 MG/DL — HIGH (ref 70–99)
GLUCOSE BLDC GLUCOMTR-MCNC: 181 MG/DL — HIGH (ref 70–99)
GLUCOSE BLDC GLUCOMTR-MCNC: 199 MG/DL — HIGH (ref 70–99)
GLUCOSE SERPL-MCNC: 83 MG/DL — SIGNIFICANT CHANGE UP (ref 70–99)
HCT VFR BLD CALC: 30 % — LOW (ref 34.5–45)
HGB BLD-MCNC: 9.8 G/DL — LOW (ref 11.5–15.5)
MAGNESIUM SERPL-MCNC: 2.2 MG/DL — SIGNIFICANT CHANGE UP (ref 1.6–2.6)
MCHC RBC-ENTMCNC: 28.4 PG — SIGNIFICANT CHANGE UP (ref 27–34)
MCHC RBC-ENTMCNC: 32.7 GM/DL — SIGNIFICANT CHANGE UP (ref 32–36)
MCV RBC AUTO: 87 FL — SIGNIFICANT CHANGE UP (ref 80–100)
MRSA PCR RESULT.: SIGNIFICANT CHANGE UP
NRBC # BLD: 0 /100 WBCS — SIGNIFICANT CHANGE UP
NRBC # FLD: 0 K/UL — SIGNIFICANT CHANGE UP
PHOSPHATE SERPL-MCNC: 4.8 MG/DL — HIGH (ref 2.5–4.5)
PLATELET # BLD AUTO: 142 K/UL — LOW (ref 150–400)
POTASSIUM SERPL-MCNC: 4.2 MMOL/L — SIGNIFICANT CHANGE UP (ref 3.5–5.3)
POTASSIUM SERPL-SCNC: 4.2 MMOL/L — SIGNIFICANT CHANGE UP (ref 3.5–5.3)
RBC # BLD: 3.45 M/UL — LOW (ref 3.8–5.2)
RBC # FLD: 13.6 % — SIGNIFICANT CHANGE UP (ref 10.3–14.5)
S AUREUS DNA NOSE QL NAA+PROBE: SIGNIFICANT CHANGE UP
SODIUM SERPL-SCNC: 132 MMOL/L — LOW (ref 135–145)
WBC # BLD: 5.34 K/UL — SIGNIFICANT CHANGE UP (ref 3.8–10.5)
WBC # FLD AUTO: 5.34 K/UL — SIGNIFICANT CHANGE UP (ref 3.8–10.5)

## 2021-05-15 PROCEDURE — 99233 SBSQ HOSP IP/OBS HIGH 50: CPT

## 2021-05-15 RX ORDER — ACETAMINOPHEN 500 MG
650 TABLET ORAL ONCE
Refills: 0 | Status: COMPLETED | OUTPATIENT
Start: 2021-05-15 | End: 2021-05-15

## 2021-05-15 RX ORDER — ONDANSETRON 8 MG/1
4 TABLET, FILM COATED ORAL ONCE
Refills: 0 | Status: COMPLETED | OUTPATIENT
Start: 2021-05-15 | End: 2021-05-15

## 2021-05-15 RX ADMIN — ONDANSETRON 4 MILLIGRAM(S): 8 TABLET, FILM COATED ORAL at 09:53

## 2021-05-15 RX ADMIN — Medication 100 MILLIGRAM(S): at 22:52

## 2021-05-15 RX ADMIN — Medication 21 UNIT(S): at 12:33

## 2021-05-15 RX ADMIN — Medication 21 UNIT(S): at 08:56

## 2021-05-15 RX ADMIN — ISOSORBIDE DINITRATE 10 MILLIGRAM(S): 5 TABLET ORAL at 22:52

## 2021-05-15 RX ADMIN — Medication 50 MILLIGRAM(S): at 22:52

## 2021-05-15 RX ADMIN — GABAPENTIN 300 MILLIGRAM(S): 400 CAPSULE ORAL at 06:03

## 2021-05-15 RX ADMIN — CHLORHEXIDINE GLUCONATE 1 APPLICATION(S): 213 SOLUTION TOPICAL at 12:32

## 2021-05-15 RX ADMIN — INSULIN GLARGINE 55 UNIT(S): 100 INJECTION, SOLUTION SUBCUTANEOUS at 22:58

## 2021-05-15 RX ADMIN — Medication 100 MILLIGRAM(S): at 12:32

## 2021-05-15 RX ADMIN — ISOSORBIDE DINITRATE 10 MILLIGRAM(S): 5 TABLET ORAL at 12:32

## 2021-05-15 RX ADMIN — BUMETANIDE 2 MILLIGRAM(S): 0.25 INJECTION INTRAMUSCULAR; INTRAVENOUS at 06:03

## 2021-05-15 RX ADMIN — HEPARIN SODIUM 5000 UNIT(S): 5000 INJECTION INTRAVENOUS; SUBCUTANEOUS at 22:52

## 2021-05-15 RX ADMIN — GABAPENTIN 300 MILLIGRAM(S): 400 CAPSULE ORAL at 15:03

## 2021-05-15 RX ADMIN — Medication 2: at 12:33

## 2021-05-15 RX ADMIN — ISOSORBIDE DINITRATE 10 MILLIGRAM(S): 5 TABLET ORAL at 06:04

## 2021-05-15 RX ADMIN — HEPARIN SODIUM 5000 UNIT(S): 5000 INJECTION INTRAVENOUS; SUBCUTANEOUS at 15:03

## 2021-05-15 RX ADMIN — BUMETANIDE 2 MILLIGRAM(S): 0.25 INJECTION INTRAMUSCULAR; INTRAVENOUS at 22:52

## 2021-05-15 RX ADMIN — Medication 100 MILLIGRAM(S): at 06:03

## 2021-05-15 RX ADMIN — Medication 50 MILLIGRAM(S): at 15:03

## 2021-05-15 RX ADMIN — ATORVASTATIN CALCIUM 80 MILLIGRAM(S): 80 TABLET, FILM COATED ORAL at 22:52

## 2021-05-15 RX ADMIN — HEPARIN SODIUM 5000 UNIT(S): 5000 INJECTION INTRAVENOUS; SUBCUTANEOUS at 06:03

## 2021-05-15 RX ADMIN — GABAPENTIN 300 MILLIGRAM(S): 400 CAPSULE ORAL at 22:52

## 2021-05-15 RX ADMIN — Medication 1 TABLET(S): at 12:32

## 2021-05-15 RX ADMIN — Medication 81 MILLIGRAM(S): at 12:32

## 2021-05-15 RX ADMIN — Medication 50 MILLIGRAM(S): at 06:03

## 2021-05-15 NOTE — PROGRESS NOTE ADULT - ASSESSMENT
71 y.o. Female w/ hx HTN, DM2 with neuropathy, Recently diagnosed CKD, PAD s/p angiogram RLE (pt denies any stents, just "clearing out"), HLD, and Thyroid nodules (benign biopsy x2 as per patient) p/w worsening SOB, LE edema, orthopnea, weight gain, and increasing abdominal girth likely 2/2 CHF exacerbation. Also with complaints of new intermittent double vision (occasionally when watching TV, does not occur otherwise) and new dysphagia intermittently. Started on diuresis, eventually placed on bumex drip. Cardio and nephro on board. Cr worsening and not improving. Decision made to start pt on HD. Cedric placed on 5/13. Started on HD Remains hospitalized pending further optimization.   71 y.o. Female w/ hx HTN, DM2 with neuropathy, Recently diagnosed CKD, PAD s/p angiogram RLE (pt denies any stents, just "clearing out"), HLD, and Thyroid nodules (benign biopsy x2 as per patient) p/w worsening SOB, LE edema, orthopnea, weight gain, and increasing abdominal girth likely 2/2 CHF exacerbation. Also with complaints of new intermittent double vision (occasionally when watching TV, does not occur otherwise) and new dysphagia intermittently. Started on diuresis, eventually placed on bumex drip. Cardio and nephro on board. Cr worsening and not improving. Decision made to start pt on HD. Cedric placed on 5/13. Started on HD. Remains hospitalized pending further optimization.

## 2021-05-15 NOTE — PROGRESS NOTE ADULT - PROBLEM SELECTOR PLAN 1
- Acute on chronic HFpEF.  - s/p RHC 5/4 c/w severe pulm HTN  - TTE normal LVEF  - S/p metolazone x2  -continue hydralazine 50 tid, isordil 10 tid, Toprol 100 mg po BID.   -Cont bumex PO - reassess dosing now that pt is on HD.  - 1.5L fluid restriction, daily weights, I/O   -Appreciate HF recs. - Acute on chronic HFpEF. Still over overloaded side.   - s/p RHC 5/4 c/w severe pulm HTN  - TTE normal LVEF  - S/p metolazone x2  -continue hydralazine 50 tid, isordil 10 tid, Toprol 100 mg po BID.   -Cont bumex PO - reassess dosing now that pt is on HD.  - 1.5L fluid restriction, daily weights, I/O   -Appreciate HF recs.

## 2021-05-15 NOTE — PROGRESS NOTE ADULT - SUBJECTIVE AND OBJECTIVE BOX
[facility] Division of Hospital Medicine  Chad BROWN KatieElías) MD Michael  Pager [phone]    SUBJECTIVE:  Follow up for CHF.    Pt seen and evaluated at bedside this AM. No o/n events. Denies any SOB/Cp/NV. Tolerating hemodialysis w/o issue. Another session scheduled for today.       ROS: All systems negative except as noted.    Vital Signs Last 24 Hrs  T(C): 36.5 (14 May 2021 11:19), Max: 36.7 (14 May 2021 06:00)  T(F): 97.7 (14 May 2021 11:19), Max: 98 (14 May 2021 06:00)  HR: 65 (14 May 2021 11:19) (63 - 81)  BP: 122/60 (14 May 2021 11:19) (122/60 - 150/73)  BP(mean): --  RR: 18 (14 May 2021 11:19) (16 - 18)  SpO2: 100% (14 May 2021 11:19) (98% - 100%)    PHYSICAL EXAM:  Gen- In bed, comfortable, NAD  Eyes- EOMI, PERRLA, nonicteric.  EMNT- Fair dentition. MMM. No tonsilar exudates. No posterior pharynx erythema.  Neck- Supple. No masses. No tracheal deviation.  Resp- CTAB, good effort. No r/r/w. No accessory muscle use.  CVS- RRR, S1S2, no g/r/m. Trace LE edema.  GI- Soft abd, NT, ND, +BSx4. No HSM.  MSK- No C/C. ROM intact. No crepitus.  Neuro- CN II-XII intact. Speech fluent/face symmetric. Sensation intact.  Skin- No rashes/ulcers. Warm/moist.  Psych- AAOx3. Appropriate mood/affect.        MEDICATION:  MEDICATIONS  (STANDING):  allopurinol 100 milliGRAM(s) Oral daily  aspirin enteric coated 81 milliGRAM(s) Oral daily  atorvastatin 80 milliGRAM(s) Oral at bedtime  buMETAnide Injectable 2 milliGRAM(s) IV Push two times a day  chlorhexidine 2% Cloths 1 Application(s) Topical daily  dextrose 40% Gel 15 Gram(s) Oral once  dextrose 5%. 1000 milliLiter(s) (50 mL/Hr) IV Continuous <Continuous>  dextrose 5%. 1000 milliLiter(s) (100 mL/Hr) IV Continuous <Continuous>  dextrose 50% Injectable 25 Gram(s) IV Push once  dextrose 50% Injectable 12.5 Gram(s) IV Push once  dextrose 50% Injectable 25 Gram(s) IV Push once  gabapentin 300 milliGRAM(s) Oral three times a day  glucagon  Injectable 1 milliGRAM(s) IntraMuscular once  heparin   Injectable 5000 Unit(s) SubCutaneous every 8 hours  hydrALAZINE 50 milliGRAM(s) Oral three times a day  insulin glargine Injectable (LANTUS) 55 Unit(s) SubCutaneous at bedtime  insulin lispro (ADMELOG) corrective regimen sliding scale   SubCutaneous three times a day before meals  insulin lispro (ADMELOG) corrective regimen sliding scale   SubCutaneous at bedtime  insulin lispro Injectable (ADMELOG) 21 Unit(s) SubCutaneous three times a day before meals  isosorbide   dinitrate Tablet (ISORDIL) 10 milliGRAM(s) Oral three times a day  metoprolol succinate  milliGRAM(s) Oral two times a day  multivitamin 1 Tablet(s) Oral daily    MEDICATIONS  (PRN):  acetaminophen   Tablet .. 650 milliGRAM(s) Oral every 6 hours PRN Temp greater or equal to 38C (100.4F), Mild Pain (1 - 3), Moderate Pain (4 - 6)  ketotifen 0.025% Ophthalmic Solution 1 Drop(s) Both EYES two times a day PRN Allergic Conjunctivitis          LABORATORY:                          10.1   4.35  )-----------( 178      ( 14 May 2021 06:36 )             31.1     05-14    135  |  92<L>  |  119<H>  ----------------------------<  255<H>  4.3   |  27  |  4.39<H>    Ca    9.9      14 May 2021 06:36  Phos  5.3     05-14  Mg     2.5     05-14      PT/INR - ( 13 May 2021 07:33 )   PT: 11.9 sec;   INR: 1.04 ratio         PTT - ( 13 May 2021 07:33 )  PTT:31.9 sec          COVID-19 PCR: Jorgetec (29 Apr 2021 10:24)               [facility] Division of Hospital Medicine  Chad WilsonEílas) MD Michael  Pager [phone]    SUBJECTIVE:  Follow up for CHF.    Pt seen and evaluated at bedside this AM. No o/n events. Had HD yesterday. Feeling nauseated. No vomiting. Denied any pain at Shiley.      ROS: All systems negative except as noted.    Vital Signs Last 24 Hrs  T(C): 36.7 (15 May 2021 06:02), Max: 36.9 (14 May 2021 21:20)  T(F): 98 (15 May 2021 06:02), Max: 98.4 (14 May 2021 21:20)  HR: 63 (15 May 2021 06:02) (61 - 64)  BP: 128/65 (15 May 2021 06:02) (128/65 - 148/74)  BP(mean): --  RR: 18 (15 May 2021 06:02) (16 - 18)  SpO2: 99% (15 May 2021 06:02) (99% - 100%)    PHYSICAL EXAM:  Gen- In bed, comfortable, NAD  Eyes- EOMI, PERRLA, nonicteric.  EMNT- Fair dentition. MMM. No tonsilar exudates. No posterior pharynx erythema.  Neck- Supple. No masses. No tracheal deviation.  Resp- CTAB, good effort. No r/r/w. No accessory muscle use.  CVS- RRR, S1S2, no g/r/m. Trace LE edema.  GI- Soft abd, NT, ND, +BSx4. No HSM.  MSK- No C/C. ROM intact. No crepitus.  Neuro- CN II-XII intact. Speech fluent/face symmetric. Sensation intact.  Skin- No rashes/ulcers. Warm/moist.  Psych- AAOx3. Appropriate mood/affect.        MEDICATION:  MEDICATIONS  (STANDING):  allopurinol 100 milliGRAM(s) Oral daily  aspirin enteric coated 81 milliGRAM(s) Oral daily  atorvastatin 80 milliGRAM(s) Oral at bedtime  buMETAnide Injectable 2 milliGRAM(s) IV Push two times a day  chlorhexidine 2% Cloths 1 Application(s) Topical daily  dextrose 40% Gel 15 Gram(s) Oral once  dextrose 5%. 1000 milliLiter(s) (50 mL/Hr) IV Continuous <Continuous>  dextrose 5%. 1000 milliLiter(s) (100 mL/Hr) IV Continuous <Continuous>  dextrose 50% Injectable 25 Gram(s) IV Push once  dextrose 50% Injectable 12.5 Gram(s) IV Push once  dextrose 50% Injectable 25 Gram(s) IV Push once  gabapentin 300 milliGRAM(s) Oral three times a day  glucagon  Injectable 1 milliGRAM(s) IntraMuscular once  heparin   Injectable 5000 Unit(s) SubCutaneous every 8 hours  hydrALAZINE 50 milliGRAM(s) Oral three times a day  insulin glargine Injectable (LANTUS) 55 Unit(s) SubCutaneous at bedtime  insulin lispro (ADMELOG) corrective regimen sliding scale   SubCutaneous three times a day before meals  insulin lispro (ADMELOG) corrective regimen sliding scale   SubCutaneous at bedtime  insulin lispro Injectable (ADMELOG) 21 Unit(s) SubCutaneous three times a day before meals  isosorbide   dinitrate Tablet (ISORDIL) 10 milliGRAM(s) Oral three times a day  metoprolol succinate  milliGRAM(s) Oral two times a day  multivitamin 1 Tablet(s) Oral daily    MEDICATIONS  (PRN):  acetaminophen   Tablet .. 650 milliGRAM(s) Oral every 6 hours PRN Temp greater or equal to 38C (100.4F), Mild Pain (1 - 3), Moderate Pain (4 - 6)  ketotifen 0.025% Ophthalmic Solution 1 Drop(s) Both EYES two times a day PRN Allergic Conjunctivitis            LABORATORY:                             9.8    5.34  )-----------( 142      ( 15 May 2021 06:36 )             30.0     05-15    132<L>  |  92<L>  |  86<H>  ----------------------------<  83  4.2   |  28  |  3.84<H>    Ca    9.9      15 May 2021 06:36  Phos  4.8     05-15  Mg     2.2     05-15                COVID-19 PCR: NotDetec (29 Apr 2021 10:24)

## 2021-05-15 NOTE — PROGRESS NOTE ADULT - SUBJECTIVE AND OBJECTIVE BOX
Nephrology Followup Note - [phone] - Dr Thompson / Dr Murdock / Dr Morocho / Dr Kendrick / Dr Magallanes / Dr Alfaro / Dr Escudero / Dr Raymundo  Pt seen and examined at bedside      Allergies:  No Known Allergies    Hospital Medications:   MEDICATIONS  (STANDING):  allopurinol 100 milliGRAM(s) Oral daily  aspirin enteric coated 81 milliGRAM(s) Oral daily  atorvastatin 80 milliGRAM(s) Oral at bedtime  buMETAnide Injectable 2 milliGRAM(s) IV Push two times a day  chlorhexidine 2% Cloths 1 Application(s) Topical daily  dextrose 40% Gel 15 Gram(s) Oral once  dextrose 5%. 1000 milliLiter(s) (50 mL/Hr) IV Continuous <Continuous>  dextrose 5%. 1000 milliLiter(s) (100 mL/Hr) IV Continuous <Continuous>  dextrose 50% Injectable 25 Gram(s) IV Push once  dextrose 50% Injectable 12.5 Gram(s) IV Push once  dextrose 50% Injectable 25 Gram(s) IV Push once  gabapentin 300 milliGRAM(s) Oral three times a day  glucagon  Injectable 1 milliGRAM(s) IntraMuscular once  heparin   Injectable 5000 Unit(s) SubCutaneous every 8 hours  hydrALAZINE 50 milliGRAM(s) Oral three times a day  insulin glargine Injectable (LANTUS) 55 Unit(s) SubCutaneous at bedtime  insulin lispro (ADMELOG) corrective regimen sliding scale   SubCutaneous three times a day before meals  insulin lispro (ADMELOG) corrective regimen sliding scale   SubCutaneous at bedtime  insulin lispro Injectable (ADMELOG) 21 Unit(s) SubCutaneous three times a day before meals  isosorbide   dinitrate Tablet (ISORDIL) 10 milliGRAM(s) Oral three times a day  metoprolol succinate  milliGRAM(s) Oral two times a day  multivitamin 1 Tablet(s) Oral daily    REVIEW OF SYSTEMS:  CONSTITUTIONAL: No weakness, fevers or chills  EYES/ENT: No visual changes;  No vertigo or throat pain   NECK: No pain or stiffness  RESPIRATORY: No cough, wheezing, hemoptysis; No shortness of breath  CARDIOVASCULAR: No chest pain or palpitations.  GASTROINTESTINAL: No abdominal or epigastric pain. No nausea, vomiting, or hematemesis; No diarrhea or constipation. No melena or hematochezia.  GENITOURINARY: No dysuria, frequency, foamy urine, urinary urgency, incontinence or hematuria  NEUROLOGICAL: No numbness or weakness  SKIN: No itching, burning, rashes, or lesions   VASCULAR: No bilateral lower extremity edema.   All other review of systems is negative unless indicated above.    VITALS:  T(F): 98 (05-15-21 @ 06:02), Max: 98.4 (05-14-21 @ 21:20)  HR: 63 (05-15-21 @ 06:02)  BP: 128/65 (05-15-21 @ 06:02)  RR: 18 (05-15-21 @ 06:02)  SpO2: 99% (05-15-21 @ 06:02)  Wt(kg): --    05-14 @ 07:01  -  05-15 @ 07:00  --------------------------------------------------------  IN: 1210 mL / OUT: 1000 mL / NET: 210 mL        PHYSICAL EXAM:  Constitutional: NAD  HEENT: anicteric sclera, oropharynx clear, MMM  Neck: No JVD  Respiratory: CTAB, no wheezes, rales or rhonchi  Cardiovascular: S1, S2, RRR  Gastrointestinal: BS+, soft, NT/ND  Extremities: No cyanosis or clubbing. No peripheral edema  Neurological: A/O x 3, no focal deficits  Psychiatric: Normal mood, normal affect  : No CVA tenderness. No paredes.   Skin: No rashes  Vascular Access: [facility] cath.     LABS:  05-15    132<L>  |  92<L>  |  86<H>  ----------------------------<  83  4.2   |  28  |  3.84<H>    Ca    9.9      15 May 2021 06:36  Phos  4.8     05-15  Mg     2.2     05-15      Creatinine Trend: 3.84 <--, 4.39 <--, 4.63 <--, 4.94 <--, 4.72 <--, 4.79 <--, 4.80 <--                        9.8    5.34  )-----------( 142      ( 15 May 2021 06:36 )             30.0     Urine Studies:      RADIOLOGY & ADDITIONAL STUDIES:

## 2021-05-15 NOTE — PROGRESS NOTE ADULT - PROBLEM SELECTOR PLAN 1
Pt with CKD3, follows with Dr Escudero in outpt clinic.   Baseline Cr ~2 from earlier this year  Worsening renal failure, requiring HD start.   No  obstruction on renal US. No significant proteinuria on UA. BP stable, no obvious nephrotoxin.   s/p shiley cath placement, had second HD yesterday. UF limited by cramping.   repeat treatment today as well.  Will attempt UF goal 0.8kg today, to optimize fluid status.   monitor daily BMP, strict I/O.   Will reassess BUN/Cr early next week for renal recovery.   If BUN/Cr remains elevated, may need to change shiley to tunneled cath, and plan for outpt HD unit placement.   dose all meds for eGFR<15ml/min.   avoid ACEi/ARB/NSAIDs/Nephrotoxics.

## 2021-05-16 DIAGNOSIS — D69.6 THROMBOCYTOPENIA, UNSPECIFIED: ICD-10-CM

## 2021-05-16 LAB
ANION GAP SERPL CALC-SCNC: 12 MMOL/L — SIGNIFICANT CHANGE UP (ref 7–14)
BUN SERPL-MCNC: 53 MG/DL — HIGH (ref 7–23)
CALCIUM SERPL-MCNC: 9.9 MG/DL — SIGNIFICANT CHANGE UP (ref 8.4–10.5)
CHLORIDE SERPL-SCNC: 89 MMOL/L — LOW (ref 98–107)
CO2 SERPL-SCNC: 27 MMOL/L — SIGNIFICANT CHANGE UP (ref 22–31)
CREAT SERPL-MCNC: 3.75 MG/DL — HIGH (ref 0.5–1.3)
GLUCOSE BLDC GLUCOMTR-MCNC: 124 MG/DL — HIGH (ref 70–99)
GLUCOSE BLDC GLUCOMTR-MCNC: 132 MG/DL — HIGH (ref 70–99)
GLUCOSE BLDC GLUCOMTR-MCNC: 178 MG/DL — HIGH (ref 70–99)
GLUCOSE BLDC GLUCOMTR-MCNC: 180 MG/DL — HIGH (ref 70–99)
GLUCOSE BLDC GLUCOMTR-MCNC: 74 MG/DL — SIGNIFICANT CHANGE UP (ref 70–99)
GLUCOSE BLDC GLUCOMTR-MCNC: 77 MG/DL — SIGNIFICANT CHANGE UP (ref 70–99)
GLUCOSE SERPL-MCNC: 169 MG/DL — HIGH (ref 70–99)
HCT VFR BLD CALC: 29.7 % — LOW (ref 34.5–45)
HGB BLD-MCNC: 9.6 G/DL — LOW (ref 11.5–15.5)
MAGNESIUM SERPL-MCNC: 2.1 MG/DL — SIGNIFICANT CHANGE UP (ref 1.6–2.6)
MCHC RBC-ENTMCNC: 28 PG — SIGNIFICANT CHANGE UP (ref 27–34)
MCHC RBC-ENTMCNC: 32.3 GM/DL — SIGNIFICANT CHANGE UP (ref 32–36)
MCV RBC AUTO: 86.6 FL — SIGNIFICANT CHANGE UP (ref 80–100)
NRBC # BLD: 0 /100 WBCS — SIGNIFICANT CHANGE UP
NRBC # FLD: 0 K/UL — SIGNIFICANT CHANGE UP
PHOSPHATE SERPL-MCNC: 4.2 MG/DL — SIGNIFICANT CHANGE UP (ref 2.5–4.5)
PLATELET # BLD AUTO: 110 K/UL — LOW (ref 150–400)
POTASSIUM SERPL-MCNC: 4.3 MMOL/L — SIGNIFICANT CHANGE UP (ref 3.5–5.3)
POTASSIUM SERPL-SCNC: 4.3 MMOL/L — SIGNIFICANT CHANGE UP (ref 3.5–5.3)
RBC # BLD: 3.43 M/UL — LOW (ref 3.8–5.2)
RBC # FLD: 13.4 % — SIGNIFICANT CHANGE UP (ref 10.3–14.5)
SODIUM SERPL-SCNC: 128 MMOL/L — LOW (ref 135–145)
WBC # BLD: 5.54 K/UL — SIGNIFICANT CHANGE UP (ref 3.8–10.5)
WBC # FLD AUTO: 5.54 K/UL — SIGNIFICANT CHANGE UP (ref 3.8–10.5)

## 2021-05-16 PROCEDURE — 99233 SBSQ HOSP IP/OBS HIGH 50: CPT

## 2021-05-16 RX ORDER — DEXTROSE 50 % IN WATER 50 %
12.5 SYRINGE (ML) INTRAVENOUS ONCE
Refills: 0 | Status: COMPLETED | OUTPATIENT
Start: 2021-05-16 | End: 2021-05-16

## 2021-05-16 RX ORDER — SENNA PLUS 8.6 MG/1
2 TABLET ORAL AT BEDTIME
Refills: 0 | Status: DISCONTINUED | OUTPATIENT
Start: 2021-05-16 | End: 2021-05-22

## 2021-05-16 RX ADMIN — SENNA PLUS 2 TABLET(S): 8.6 TABLET ORAL at 21:45

## 2021-05-16 RX ADMIN — GABAPENTIN 300 MILLIGRAM(S): 400 CAPSULE ORAL at 21:45

## 2021-05-16 RX ADMIN — Medication 21 UNIT(S): at 08:44

## 2021-05-16 RX ADMIN — GABAPENTIN 300 MILLIGRAM(S): 400 CAPSULE ORAL at 05:55

## 2021-05-16 RX ADMIN — INSULIN GLARGINE 55 UNIT(S): 100 INJECTION, SOLUTION SUBCUTANEOUS at 22:29

## 2021-05-16 RX ADMIN — Medication 100 MILLIGRAM(S): at 17:36

## 2021-05-16 RX ADMIN — Medication 50 MILLIGRAM(S): at 15:17

## 2021-05-16 RX ADMIN — HEPARIN SODIUM 5000 UNIT(S): 5000 INJECTION INTRAVENOUS; SUBCUTANEOUS at 05:55

## 2021-05-16 RX ADMIN — BUMETANIDE 2 MILLIGRAM(S): 0.25 INJECTION INTRAMUSCULAR; INTRAVENOUS at 17:35

## 2021-05-16 RX ADMIN — Medication 21 UNIT(S): at 17:36

## 2021-05-16 RX ADMIN — HEPARIN SODIUM 5000 UNIT(S): 5000 INJECTION INTRAVENOUS; SUBCUTANEOUS at 15:17

## 2021-05-16 RX ADMIN — Medication 2: at 08:45

## 2021-05-16 RX ADMIN — Medication 21 UNIT(S): at 12:21

## 2021-05-16 RX ADMIN — Medication 100 MILLIGRAM(S): at 10:48

## 2021-05-16 RX ADMIN — GABAPENTIN 300 MILLIGRAM(S): 400 CAPSULE ORAL at 15:17

## 2021-05-16 RX ADMIN — ISOSORBIDE DINITRATE 10 MILLIGRAM(S): 5 TABLET ORAL at 15:17

## 2021-05-16 RX ADMIN — CHLORHEXIDINE GLUCONATE 1 APPLICATION(S): 213 SOLUTION TOPICAL at 08:45

## 2021-05-16 RX ADMIN — BUMETANIDE 2 MILLIGRAM(S): 0.25 INJECTION INTRAMUSCULAR; INTRAVENOUS at 05:55

## 2021-05-16 RX ADMIN — Medication 1 TABLET(S): at 10:48

## 2021-05-16 RX ADMIN — ATORVASTATIN CALCIUM 80 MILLIGRAM(S): 80 TABLET, FILM COATED ORAL at 21:45

## 2021-05-16 RX ADMIN — Medication 50 MILLIGRAM(S): at 05:55

## 2021-05-16 RX ADMIN — Medication 100 MILLIGRAM(S): at 05:55

## 2021-05-16 RX ADMIN — Medication 50 MILLIGRAM(S): at 21:44

## 2021-05-16 RX ADMIN — ISOSORBIDE DINITRATE 10 MILLIGRAM(S): 5 TABLET ORAL at 10:48

## 2021-05-16 RX ADMIN — Medication 81 MILLIGRAM(S): at 10:48

## 2021-05-16 RX ADMIN — HEPARIN SODIUM 5000 UNIT(S): 5000 INJECTION INTRAVENOUS; SUBCUTANEOUS at 21:45

## 2021-05-16 RX ADMIN — ISOSORBIDE DINITRATE 10 MILLIGRAM(S): 5 TABLET ORAL at 05:55

## 2021-05-16 RX ADMIN — Medication 12.5 GRAM(S): at 22:13

## 2021-05-16 NOTE — PROGRESS NOTE ADULT - PROBLEM SELECTOR PLAN 7
-Stable. Cont isordil, metoprolol, hydralazine. -FS improved  -Appreciate endo recs.  -Current on basal/bolus w/ ISS. Titrate as per endo.

## 2021-05-16 NOTE — PROGRESS NOTE ADULT - PROBLEM SELECTOR PROBLEM 6
Type 2 diabetes mellitus with diabetic polyneuropathy, with long-term current use of insulin Dysphagia, unspecified type

## 2021-05-16 NOTE — PROGRESS NOTE ADULT - PROBLEM SELECTOR PLAN 1
- Acute on chronic HFpEF. Still overloaded side.   - s/p RHC 5/4 c/w severe pulm HTN  - TTE normal LVEF  - S/p metolazone x2  -continue hydralazine 50 tid, isordil 10 tid, Toprol 100 mg po BID.   -Cont bumex PO as discussed w/ nephro while on HD. Continue to reassess.  - 1.5L fluid restriction, daily weights, I/O   -Cont HD for volume optimization.   -Appreciate HF recs.

## 2021-05-16 NOTE — PROGRESS NOTE ADULT - SUBJECTIVE AND OBJECTIVE BOX
Nephrology Followup Note - [phone] - Dr Thompson / Dr Murdock / Dr Morocho / Dr Kendrick / Dr Magallanes / Dr Alfaro / Dr Escudero / Dr Raymundo  Pt seen and examined at bedside  No acute events overnight. No complaints.     Allergies:  No Known Allergies    Hospital Medications:   MEDICATIONS  (STANDING):  allopurinol 100 milliGRAM(s) Oral daily  aspirin enteric coated 81 milliGRAM(s) Oral daily  atorvastatin 80 milliGRAM(s) Oral at bedtime  buMETAnide Injectable 2 milliGRAM(s) IV Push two times a day  chlorhexidine 2% Cloths 1 Application(s) Topical daily  dextrose 40% Gel 15 Gram(s) Oral once  dextrose 5%. 1000 milliLiter(s) (50 mL/Hr) IV Continuous <Continuous>  dextrose 5%. 1000 milliLiter(s) (100 mL/Hr) IV Continuous <Continuous>  dextrose 50% Injectable 25 Gram(s) IV Push once  dextrose 50% Injectable 12.5 Gram(s) IV Push once  dextrose 50% Injectable 25 Gram(s) IV Push once  gabapentin 300 milliGRAM(s) Oral three times a day  glucagon  Injectable 1 milliGRAM(s) IntraMuscular once  heparin   Injectable 5000 Unit(s) SubCutaneous every 8 hours  hydrALAZINE 50 milliGRAM(s) Oral three times a day  insulin glargine Injectable (LANTUS) 55 Unit(s) SubCutaneous at bedtime  insulin lispro (ADMELOG) corrective regimen sliding scale   SubCutaneous three times a day before meals  insulin lispro (ADMELOG) corrective regimen sliding scale   SubCutaneous at bedtime  insulin lispro Injectable (ADMELOG) 21 Unit(s) SubCutaneous three times a day before meals  isosorbide   dinitrate Tablet (ISORDIL) 10 milliGRAM(s) Oral three times a day  metoprolol succinate  milliGRAM(s) Oral two times a day  multivitamin 1 Tablet(s) Oral daily    VITALS:  T(F): 97.7 (05-16-21 @ 10:47), Max: 98.7 (05-15-21 @ 12:31)  HR: 61 (05-16-21 @ 10:47)  BP: 119/69 (05-16-21 @ 10:47)  RR: 17 (05-16-21 @ 10:47)  SpO2: 100% (05-16-21 @ 10:47)  Wt(kg): --    05-15 @ 07:01  -  05-16 @ 07:00  --------------------------------------------------------  IN: 400 mL / OUT: 1200 mL / NET: -800 mL        PHYSICAL EXAM:  Constitutional: NAD  HEENT: anicteric sclera, oropharynx clear, MMM  Neck: No JVD  Respiratory: CTAB, no wheezes, rales or rhonchi  Cardiovascular: S1, S2, RRR  Gastrointestinal: BS+, soft, NT/ND  Extremities: No cyanosis or clubbing. No peripheral edema  Neurological: A/O x 3, no focal deficits  Psychiatric: Normal mood, normal affect  : No CVA tenderness. No paredes.   Skin: No rashes  Vascular Access: [facility] cath.     LABS:  05-16    128<L>  |  89<L>  |  53<H>  ----------------------------<  169<H>  4.3   |  27  |  3.75<H>    Ca    9.9      16 May 2021 06:50  Phos  4.2     05-16  Mg     2.1     05-16      Creatinine Trend: 3.75 <--, 3.84 <--, 4.39 <--, 4.63 <--, 4.94 <--, 4.72 <--, 4.79 <--                        9.6    5.54  )-----------( 110      ( 16 May 2021 06:52 )             29.7     Urine Studies:      RADIOLOGY & ADDITIONAL STUDIES:

## 2021-05-16 NOTE — PROGRESS NOTE ADULT - PROBLEM SELECTOR PLAN 6
-FS improved  -Appreciate endo recs.  -Current on basal/bolus w/ ISS. Titrate as per endo. - c/o new onset dysphagia, noted over the past few weeks, mostly to large pills, occasionally to food  - SLP -> regular and thin

## 2021-05-16 NOTE — PROGRESS NOTE ADULT - PROBLEM SELECTOR PLAN 3
-No active CP. No significant tele events.   -Troponins flat. Lower suspicion for ACS.   -Likely 2' T2MI in setting of decompensated HF and also elevation from baseline CKD. Trend labs. No active bleeding.

## 2021-05-16 NOTE — PROGRESS NOTE ADULT - PROBLEM SELECTOR PLAN 2
-Stable at this time. As per nephro needs HD.   -S/p Cedric on 5/13.  -PO diuresis as above. HD as per nephro  -F/u nephrology. Avoid nephrotoxins.  -Trend labs.

## 2021-05-16 NOTE — PROGRESS NOTE ADULT - PROBLEM SELECTOR PLAN 1
Pt with CKD3, follows with Dr Escudero in outpt clinic.   Baseline Cr ~2 from earlier this year  Worsening renal failure, requiring HD start.   No  obstruction on renal US. No significant proteinuria on UA. BP stable, no obvious nephrotoxin.   s/p shiley cath placement, had third HD yesterday. UF 0.8kg achieved.   Will likely plan for next HD 5/18, pending BUN/Cr early next week, monitor for renal recovery  If BUN/Cr remains elevated, may need to change shiley to tunneled cath, and plan for outpt HD unit placement.   monitor daily BMP, strict I/O.   dose all meds for eGFR<15ml/min.   avoid ACEi/ARB/NSAIDs/Nephrotoxics.

## 2021-05-16 NOTE — PROGRESS NOTE ADULT - PROBLEM SELECTOR PLAN 5
- c/o new onset dysphagia, noted over the past few weeks, mostly to large pills, occasionally to food  - SLP -> regular and thin - intermittent double vision over the past month, intermittent, currently resolved  - CTH with no acute intracranial abnormalities  - f/u OP with Optho, has appointment

## 2021-05-16 NOTE — PROGRESS NOTE ADULT - SUBJECTIVE AND OBJECTIVE BOX
[facility] Division of Hospital Medicine  Chad WilsonElías) MD Michael  Pager [phone]    SUBJECTIVE:  Follow up for CHF.    Pt seen and evaluated at bedside this AM. No o/n events. Had HD yesterday. No cramping. Tolerated better. Has nausea this AM. No vomiting. Had BM, but somewhat constipated.      ROS: All systems negative except as noted.    Vital Signs Last 24 Hrs  T(C): 36.5 (16 May 2021 10:47), Max: 36.8 (15 May 2021 15:02)  T(F): 97.7 (16 May 2021 10:47), Max: 98.3 (15 May 2021 15:02)  HR: 61 (16 May 2021 10:47) (61 - 67)  BP: 119/69 (16 May 2021 10:47) (115/63 - 141/76)  BP(mean): --  RR: 17 (16 May 2021 10:47) (17 - 18)  SpO2: 100% (16 May 2021 10:47) (96% - 100%)    PHYSICAL EXAM:  Gen- In bed, comfortable, NAD  Eyes- EOMI, PERRLA, nonicteric.  EMNT- Fair dentition. MMM. No tonsilar exudates. No posterior pharynx erythema.  Neck- Supple. No masses. No tracheal deviation.  Resp- CTAB, good effort. No r/r/w. No accessory muscle use.  CVS- RRR, S1S2, no g/r/m. Trace LE edema.  GI- Soft obese abd, NT, ND, +BSx4. No HSM.  MSK- No C/C. ROM intact. No crepitus.  Neuro- CN II-XII intact. Speech fluent/face symmetric. Sensation intact.  Skin- No rashes/ulcers. Warm/moist.  Psych- AAOx3. Appropriate mood/affect.        MEDICATION:  MEDICATIONS  (STANDING):  allopurinol 100 milliGRAM(s) Oral daily  aspirin enteric coated 81 milliGRAM(s) Oral daily  atorvastatin 80 milliGRAM(s) Oral at bedtime  buMETAnide Injectable 2 milliGRAM(s) IV Push two times a day  chlorhexidine 2% Cloths 1 Application(s) Topical daily  dextrose 40% Gel 15 Gram(s) Oral once  dextrose 5%. 1000 milliLiter(s) (50 mL/Hr) IV Continuous <Continuous>  dextrose 5%. 1000 milliLiter(s) (100 mL/Hr) IV Continuous <Continuous>  dextrose 50% Injectable 25 Gram(s) IV Push once  dextrose 50% Injectable 12.5 Gram(s) IV Push once  dextrose 50% Injectable 25 Gram(s) IV Push once  gabapentin 300 milliGRAM(s) Oral three times a day  glucagon  Injectable 1 milliGRAM(s) IntraMuscular once  heparin   Injectable 5000 Unit(s) SubCutaneous every 8 hours  hydrALAZINE 50 milliGRAM(s) Oral three times a day  insulin glargine Injectable (LANTUS) 55 Unit(s) SubCutaneous at bedtime  insulin lispro (ADMELOG) corrective regimen sliding scale   SubCutaneous three times a day before meals  insulin lispro (ADMELOG) corrective regimen sliding scale   SubCutaneous at bedtime  insulin lispro Injectable (ADMELOG) 21 Unit(s) SubCutaneous three times a day before meals  isosorbide   dinitrate Tablet (ISORDIL) 10 milliGRAM(s) Oral three times a day  metoprolol succinate  milliGRAM(s) Oral two times a day  multivitamin 1 Tablet(s) Oral daily    MEDICATIONS  (PRN):  acetaminophen   Tablet .. 650 milliGRAM(s) Oral every 6 hours PRN Temp greater or equal to 38C (100.4F), Mild Pain (1 - 3), Moderate Pain (4 - 6)  ketotifen 0.025% Ophthalmic Solution 1 Drop(s) Both EYES two times a day PRN Allergic Conjunctivitis          LABORATORY:                          9.6    5.54  )-----------( 110      ( 16 May 2021 06:52 )             29.7     05-16    128<L>  |  89<L>  |  53<H>  ----------------------------<  169<H>  4.3   |  27  |  3.75<H>    Ca    9.9      16 May 2021 06:50  Phos  4.2     05-16  Mg     2.1     05-16            COVID-19 PCR: Jorgeteshad (29 Apr 2021 10:24)

## 2021-05-16 NOTE — PROGRESS NOTE ADULT - PROBLEM SELECTOR PLAN 8
DVT ppx - Heparin 5,000u sc tid    Dispo- pending further improvement of renal function volume status, now on HD.  PT eval - anticipating home - mariusz f/u for dispo recs. -Stable. Cont isordil, metoprolol, hydralazine.

## 2021-05-16 NOTE — PROGRESS NOTE ADULT - ASSESSMENT
71 y.o. Female w/ hx HTN, DM2 with neuropathy, Recently diagnosed CKD, PAD s/p angiogram RLE (pt denies any stents, just "clearing out"), HLD, and Thyroid nodules (benign biopsy x2 as per patient) p/w worsening SOB, LE edema, orthopnea, weight gain, and increasing abdominal girth likely 2/2 CHF exacerbation. Also with complaints of new intermittent double vision (occasionally when watching TV, does not occur otherwise) and new dysphagia intermittently. Started on diuresis, eventually placed on bumex drip. Cardio and nephro on board. Cr worsening and not improving. Decision made to start pt on HD. Cedric placed on 5/13. Started on HD. Remains hospitalized pending further optimization.

## 2021-05-16 NOTE — PROGRESS NOTE ADULT - PROBLEM SELECTOR PLAN 4
- intermittent double vision over the past month, intermittent, currently resolved  - CTH with no acute intracranial abnormalities  - f/u OP with Optho, has appointment -No active CP. No significant tele events.   -Troponins flat. Lower suspicion for ACS.   -Likely 2' T2MI in setting of decompensated HF and also elevation from baseline CKD.

## 2021-05-16 NOTE — PROGRESS NOTE ADULT - PROBLEM SELECTOR PROBLEM 7
HTN (hypertension) Type 2 diabetes mellitus with diabetic polyneuropathy, with long-term current use of insulin

## 2021-05-17 LAB
ANION GAP SERPL CALC-SCNC: 15 MMOL/L — HIGH (ref 7–14)
BUN SERPL-MCNC: 65 MG/DL — HIGH (ref 7–23)
CALCIUM SERPL-MCNC: 10.2 MG/DL — SIGNIFICANT CHANGE UP (ref 8.4–10.5)
CHLORIDE SERPL-SCNC: 89 MMOL/L — LOW (ref 98–107)
CO2 SERPL-SCNC: 25 MMOL/L — SIGNIFICANT CHANGE UP (ref 22–31)
CREAT SERPL-MCNC: 4.72 MG/DL — HIGH (ref 0.5–1.3)
GLUCOSE BLDC GLUCOMTR-MCNC: 122 MG/DL — HIGH (ref 70–99)
GLUCOSE BLDC GLUCOMTR-MCNC: 140 MG/DL — HIGH (ref 70–99)
GLUCOSE BLDC GLUCOMTR-MCNC: 142 MG/DL — HIGH (ref 70–99)
GLUCOSE BLDC GLUCOMTR-MCNC: 202 MG/DL — HIGH (ref 70–99)
GLUCOSE BLDC GLUCOMTR-MCNC: 91 MG/DL — SIGNIFICANT CHANGE UP (ref 70–99)
GLUCOSE SERPL-MCNC: 149 MG/DL — HIGH (ref 70–99)
HCT VFR BLD CALC: 30.1 % — LOW (ref 34.5–45)
HGB BLD-MCNC: 9.9 G/DL — LOW (ref 11.5–15.5)
MAGNESIUM SERPL-MCNC: 2.2 MG/DL — SIGNIFICANT CHANGE UP (ref 1.6–2.6)
MCHC RBC-ENTMCNC: 28.2 PG — SIGNIFICANT CHANGE UP (ref 27–34)
MCHC RBC-ENTMCNC: 32.9 GM/DL — SIGNIFICANT CHANGE UP (ref 32–36)
MCV RBC AUTO: 85.8 FL — SIGNIFICANT CHANGE UP (ref 80–100)
NRBC # BLD: 0 /100 WBCS — SIGNIFICANT CHANGE UP
NRBC # FLD: 0 K/UL — SIGNIFICANT CHANGE UP
PHOSPHATE SERPL-MCNC: 6.2 MG/DL — HIGH (ref 2.5–4.5)
PLATELET # BLD AUTO: 138 K/UL — LOW (ref 150–400)
POTASSIUM SERPL-MCNC: 4.4 MMOL/L — SIGNIFICANT CHANGE UP (ref 3.5–5.3)
POTASSIUM SERPL-SCNC: 4.4 MMOL/L — SIGNIFICANT CHANGE UP (ref 3.5–5.3)
RBC # BLD: 3.51 M/UL — LOW (ref 3.8–5.2)
RBC # FLD: 13.3 % — SIGNIFICANT CHANGE UP (ref 10.3–14.5)
SODIUM SERPL-SCNC: 129 MMOL/L — LOW (ref 135–145)
WBC # BLD: 6.09 K/UL — SIGNIFICANT CHANGE UP (ref 3.8–10.5)
WBC # FLD AUTO: 6.09 K/UL — SIGNIFICANT CHANGE UP (ref 3.8–10.5)

## 2021-05-17 PROCEDURE — 99232 SBSQ HOSP IP/OBS MODERATE 35: CPT

## 2021-05-17 PROCEDURE — 99233 SBSQ HOSP IP/OBS HIGH 50: CPT

## 2021-05-17 RX ORDER — HYDRALAZINE HCL 50 MG
25 TABLET ORAL THREE TIMES A DAY
Refills: 0 | Status: DISCONTINUED | OUTPATIENT
Start: 2021-05-17 | End: 2021-05-22

## 2021-05-17 RX ADMIN — BUMETANIDE 2 MILLIGRAM(S): 0.25 INJECTION INTRAMUSCULAR; INTRAVENOUS at 17:36

## 2021-05-17 RX ADMIN — ISOSORBIDE DINITRATE 10 MILLIGRAM(S): 5 TABLET ORAL at 13:29

## 2021-05-17 RX ADMIN — ISOSORBIDE DINITRATE 10 MILLIGRAM(S): 5 TABLET ORAL at 05:32

## 2021-05-17 RX ADMIN — GABAPENTIN 300 MILLIGRAM(S): 400 CAPSULE ORAL at 13:29

## 2021-05-17 RX ADMIN — GABAPENTIN 300 MILLIGRAM(S): 400 CAPSULE ORAL at 05:32

## 2021-05-17 RX ADMIN — Medication 50 MILLIGRAM(S): at 13:29

## 2021-05-17 RX ADMIN — INSULIN GLARGINE 55 UNIT(S): 100 INJECTION, SOLUTION SUBCUTANEOUS at 22:00

## 2021-05-17 RX ADMIN — CHLORHEXIDINE GLUCONATE 1 APPLICATION(S): 213 SOLUTION TOPICAL at 13:16

## 2021-05-17 RX ADMIN — Medication 4: at 13:17

## 2021-05-17 RX ADMIN — Medication 81 MILLIGRAM(S): at 13:16

## 2021-05-17 RX ADMIN — BUMETANIDE 2 MILLIGRAM(S): 0.25 INJECTION INTRAMUSCULAR; INTRAVENOUS at 05:31

## 2021-05-17 RX ADMIN — Medication 1 TABLET(S): at 13:15

## 2021-05-17 RX ADMIN — HEPARIN SODIUM 5000 UNIT(S): 5000 INJECTION INTRAVENOUS; SUBCUTANEOUS at 05:32

## 2021-05-17 RX ADMIN — Medication 50 MILLIGRAM(S): at 05:32

## 2021-05-17 RX ADMIN — Medication 21 UNIT(S): at 13:17

## 2021-05-17 RX ADMIN — Medication 100 MILLIGRAM(S): at 05:32

## 2021-05-17 RX ADMIN — HEPARIN SODIUM 5000 UNIT(S): 5000 INJECTION INTRAVENOUS; SUBCUTANEOUS at 13:28

## 2021-05-17 RX ADMIN — Medication 100 MILLIGRAM(S): at 13:16

## 2021-05-17 RX ADMIN — Medication 21 UNIT(S): at 09:00

## 2021-05-17 NOTE — PROGRESS NOTE ADULT - ATTENDING COMMENTS
Decrease hydralazine to 25 mg po tid. Hold hydralazine dose on the AM of HD to facilitate more fluid removal.  Aim to remove 2L of fluid daily. Pt remains volume overloaded.

## 2021-05-17 NOTE — PROGRESS NOTE ADULT - PROBLEM SELECTOR PLAN 1
-Urine output lower. Aprox 400 ml UO daily per patient. Patient had HD Sat/Sun.  -BP stable.   -Continue Bumex 2 IV BID.   - continue hydralazine 50 tid with isordil 10 tid  -Continue Toprol 100 mg po BID.   -Keep k 4.0-5.0 and mag 2.0.   -Daily standing weights and strict I/O.  -Await renal team decision regarding HD being long term vs temporary.

## 2021-05-17 NOTE — PROVIDER CONTACT NOTE (OTHER) - DATE AND TIME:
05-May-2021 02:20
16-May-2021 22:00
05-May-2021 11:50
05-May-2021 14:34
17-May-2021 17:25
04-May-2021 18:10
17-May-2021 13:28
29-Apr-2021 19:40
30-Apr-2021 16:58

## 2021-05-17 NOTE — PROVIDER CONTACT NOTE (OTHER) - ASSESSMENT
/67   vs as documented   pt asymptomatic sitting in chair
On admission, patient /77 HR 83 NSR. Patient asymptomatic to BP.
HR 55 on monitor, /54. Patient due for hydralazine and isordil. Patient asymptomatic.
Pt /80, other vitals stable. Pt asymptomatic
Pt asymptomatic. Pt sitting on chair.
Pt woke up to go to the bathroom and noted that her right arm is swollen. Pt notified RN. Upon assessment, right arm noted with swelling more towards the hand. Positive radial and brachial pulses; arm warm to touch. No hematoma or bleeding noted on cath site.
/67   vs as documented   pt asymptomatic sitting in chair
Bp 136/45  pt asymptomatic resting in bed
HR sustaining 55-58, /55. Patient is due for 100mg metoprolol and IV Push bumex right now.

## 2021-05-17 NOTE — PROGRESS NOTE ADULT - PROBLEM SELECTOR PLAN 1
- Acute on chronic HFpEF, initiated on HD   - s/p RHC 5/4 c/w severe pulm HTN  - TTE normal LVEF  - S/p metolazone x2  -continue hydralazine 50 tid, isordil 10 tid, Toprol 100 mg po BID.   - 1.5L fluid restriction, daily weights, I/O   -Cont HD for volume optimization.   -Appreciate HF recs.  - Remains on IV Bumex, will f/u HF and renal recommendations, appears euvolemic today on exam

## 2021-05-17 NOTE — PROVIDER CONTACT NOTE (OTHER) - NAME OF MD/NP/PA/DO NOTIFIED:
RADHA Crenshaw
RADHA Gunderson
tele maylin Crenshaw
tele maylin Crenshaw
ACP Sarah
ACP Zeenat Mao
RADHA Crenshaw
ALFREDA Sinclair
tele maylin Crenshaw

## 2021-05-17 NOTE — PROVIDER CONTACT NOTE (OTHER) - ACTION/TREATMENT ORDERED:
Repeated finger stick immediately before D50, it was 74. Pt was asymptomatic. D50 given, repeat finger stick after 15 minutes. Blood glucose 178. 55 Units of Lantus given as per ALFREDA Mao.
ACP Nancy Gunderson notified. Right arm elevated. AM labs drawn. Awaiting further orders
Reschedule 100mg metoprolol to 8 pm and give IV push bumex now as per ACP Flower
Ok to give BP medications as per ACP Flower.
rebecca carlisle notified  Isosorbide given as per orders
tele pa notified   as per tele pa reschedule metoprolol and recheck BP at 8pm   will notify night rn
Continue to monitor
Patient is due for hydralazine and lisinopril. RN will administer now.
tele pa notified  hydralazine given as per orders

## 2021-05-17 NOTE — PROVIDER CONTACT NOTE (OTHER) - SITUATION
Vital signs
Bp 136/45
Pt due for vitals Q4 with CIWA
Vital signs
/69
Pt due for blood sugar test
Hypertension
/67

## 2021-05-17 NOTE — PROGRESS NOTE ADULT - SUBJECTIVE AND OBJECTIVE BOX
[facility] of [facility] Medicine  Areli Celeste DO  Pager (URIEL-JEYSON, 8A-5P): [phone]    Patient is a 71y old  Female who presents with a chief complaint of Worsening SOB (17 May 2021 14:49)    SUBJECTIVE / OVERNIGHT EVENTS: Pt denies acute complaints, denies CP/SOB, sitting comfortably upright in chair.  Tolerated 1st session of HD well last week, denies pain at  Shiley site.       MEDICATIONS  (STANDING):  allopurinol 100 milliGRAM(s) Oral daily  aspirin enteric coated 81 milliGRAM(s) Oral daily  atorvastatin 80 milliGRAM(s) Oral at bedtime  buMETAnide Injectable 2 milliGRAM(s) IV Push two times a day  chlorhexidine 2% Cloths 1 Application(s) Topical daily  dextrose 40% Gel 15 Gram(s) Oral once  dextrose 5%. 1000 milliLiter(s) (50 mL/Hr) IV Continuous <Continuous>  dextrose 5%. 1000 milliLiter(s) (100 mL/Hr) IV Continuous <Continuous>  dextrose 50% Injectable 25 Gram(s) IV Push once  dextrose 50% Injectable 12.5 Gram(s) IV Push once  dextrose 50% Injectable 25 Gram(s) IV Push once  gabapentin 300 milliGRAM(s) Oral three times a day  glucagon  Injectable 1 milliGRAM(s) IntraMuscular once  heparin   Injectable 5000 Unit(s) SubCutaneous every 8 hours  hydrALAZINE 50 milliGRAM(s) Oral three times a day  insulin glargine Injectable (LANTUS) 55 Unit(s) SubCutaneous at bedtime  insulin lispro (ADMELOG) corrective regimen sliding scale   SubCutaneous three times a day before meals  insulin lispro (ADMELOG) corrective regimen sliding scale   SubCutaneous at bedtime  insulin lispro Injectable (ADMELOG) 21 Unit(s) SubCutaneous three times a day before meals  isosorbide   dinitrate Tablet (ISORDIL) 10 milliGRAM(s) Oral three times a day  metoprolol succinate  milliGRAM(s) Oral two times a day  multivitamin 1 Tablet(s) Oral daily  senna 2 Tablet(s) Oral at bedtime    MEDICATIONS  (PRN):  acetaminophen   Tablet .. 650 milliGRAM(s) Oral every 6 hours PRN Temp greater or equal to 38C (100.4F), Mild Pain (1 - 3), Moderate Pain (4 - 6)  ketotifen 0.025% Ophthalmic Solution 1 Drop(s) Both EYES two times a day PRN Allergic Conjunctivitis      CAPILLARY BLOOD GLUCOSE      POCT Blood Glucose.: 202 mg/dL (17 May 2021 12:36)  POCT Blood Glucose.: 142 mg/dL (17 May 2021 08:45)  POCT Blood Glucose.: 178 mg/dL (16 May 2021 22:25)  POCT Blood Glucose.: 74 mg/dL (16 May 2021 22:05)  POCT Blood Glucose.: 77 mg/dL (16 May 2021 21:17)  POCT Blood Glucose.: 124 mg/dL (16 May 2021 17:28)    I&O's Summary    16 May 2021 07:01  -  17 May 2021 07:00  --------------------------------------------------------  IN: 100 mL / OUT: 800 mL / NET: -700 mL        PHYSICAL EXAM:  Vital Signs Last 24 Hrs  T(C): 36.6 (17 May 2021 13:28), Max: 36.6 (16 May 2021 17:34)  T(F): 97.9 (17 May 2021 13:28), Max: 97.9 (17 May 2021 13:28)  HR: 55 (17 May 2021 13:28) (55 - 64)  BP: 134/54 (17 May 2021 13:28) (110/63 - 134/54)  BP(mean): --  RR: 18 (17 May 2021 13:28) (17 - 18)  SpO2: 99% (17 May 2021 13:28) (99% - 100%)    CONSTITUTIONAL: NAD, well-developed, well-groomed  EYES: PERRLA; conjunctiva and sclera clear  RESPIRATORY: Normal respiratory effort; lungs are clear to auscultation bilaterally  CARDIOVASCULAR: Regular rate and rhythm, normal S1 and S2, no murmur/rub/gallop; Trace b/l LE edema  ABDOMEN: Nontender to palpation, normoactive bowel sounds, no rebound/guarding  MUSCLOSKELETAL:  No clubbing or cyanosis of digits; no joint swelling or tenderness to palpation  PSYCH: A+O to person, place, and time; affect appropriate  NEUROLOGY: CN 2-12 are intact and symmetric; no gross sensory deficits;   SKIN: No rashes; no palpable lesions    LABS:                        9.9    6.09  )-----------( 138      ( 17 May 2021 06:52 )             30.1     05-17    129<L>  |  89<L>  |  65<H>  ----------------------------<  149<H>  4.4   |  25  |  4.72<H>    Ca    10.2      17 May 2021 06:52  Phos  6.2     05-17  Mg     2.2     05-17

## 2021-05-17 NOTE — PROGRESS NOTE ADULT - PROBLEM SELECTOR PLAN 8
DVT ppx - Heparin 5,000u sc tid    Dispo- pending further improvement of renal function volume status, now on HD.  Pt may require permacath   PT eval - anticipating home - willf/u for dispo recs.

## 2021-05-17 NOTE — PROGRESS NOTE ADULT - SUBJECTIVE AND OBJECTIVE BOX
[facility] - S Collette / Donna S /D Aroldo/ S Elpidio/ ROSALIA Morocho/ Ben Escudero / URIEL Groveu/ O Dominic  service -[phone], office [phone]  ---------------------------------------------------------------------------------------------------------------    Patient seen and examined bedside    Subjective and Objective: No overnight events, sob resolved. No complaints today. feeling better    Allergies: No Known Allergies      Hospital Medications:   MEDICATIONS  (STANDING):  allopurinol 100 milliGRAM(s) Oral daily  aspirin enteric coated 81 milliGRAM(s) Oral daily  atorvastatin 80 milliGRAM(s) Oral at bedtime  buMETAnide Injectable 2 milliGRAM(s) IV Push two times a day  chlorhexidine 2% Cloths 1 Application(s) Topical daily  dextrose 40% Gel 15 Gram(s) Oral once  dextrose 5%. 1000 milliLiter(s) (50 mL/Hr) IV Continuous <Continuous>  dextrose 5%. 1000 milliLiter(s) (100 mL/Hr) IV Continuous <Continuous>  dextrose 50% Injectable 25 Gram(s) IV Push once  dextrose 50% Injectable 12.5 Gram(s) IV Push once  dextrose 50% Injectable 25 Gram(s) IV Push once  gabapentin 300 milliGRAM(s) Oral three times a day  glucagon  Injectable 1 milliGRAM(s) IntraMuscular once  heparin   Injectable 5000 Unit(s) SubCutaneous every 8 hours  hydrALAZINE 50 milliGRAM(s) Oral three times a day  insulin glargine Injectable (LANTUS) 55 Unit(s) SubCutaneous at bedtime  insulin lispro (ADMELOG) corrective regimen sliding scale   SubCutaneous three times a day before meals  insulin lispro (ADMELOG) corrective regimen sliding scale   SubCutaneous at bedtime  insulin lispro Injectable (ADMELOG) 21 Unit(s) SubCutaneous three times a day before meals  isosorbide   dinitrate Tablet (ISORDIL) 10 milliGRAM(s) Oral three times a day  metoprolol succinate  milliGRAM(s) Oral two times a day  multivitamin 1 Tablet(s) Oral daily  senna 2 Tablet(s) Oral at bedtime      REVIEW OF SYSTEMS:  CONSTITUTIONAL: No weakness, fevers or chills  EYES/ENT: No visual changes;  No vertigo or throat pain   NECK: No pain or stiffness  RESPIRATORY: No cough, wheezing, hemoptysis; No shortness of breath  CARDIOVASCULAR: No chest pain or palpitations.  GASTROINTESTINAL: No abdominal or epigastric pain. No nausea, vomiting, or hematemesis; No diarrhea or constipation. No melena or hematochezia.  GENITOURINARY: No dysuria, frequency, foamy urine, urinary urgency, incontinence or hematuria  NEUROLOGICAL: No numbness or weakness  SKIN: No itching, burning, rashes, or lesions   VASCULAR: No bilateral lower extremity edema.   All other review of systems is negative unless indicated above.    VITALS:  T(F): 97.9 (05-17-21 @ 13:28), Max: 97.9 (05-17-21 @ 13:28)  HR: 55 (05-17-21 @ 13:28)  BP: 134/54 (05-17-21 @ 13:28)  RR: 18 (05-17-21 @ 13:28)  SpO2: 99% (05-17-21 @ 13:28)  Wt(kg): --    05-16 @ 07:01  -  05-17 @ 07:00  --------------------------------------------------------  IN: 100 mL / OUT: 800 mL / NET: -700 mL          PHYSICAL EXAM:  Constitutional: NAD  HEENT: anicteric sclera, oropharynx clear  Neck: No JVD  Respiratory: CTAB, no wheezes, rales or rhonchi  Cardiovascular: S1, S2, RRR  Gastrointestinal: BS+, soft, NT/ND  Extremities: No cyanosis or clubbing. No peripheral edema  Neurological: A/O x 3, no focal deficits  Psychiatric: Normal mood, normal affect  : No CVA tenderness. No paredes.   Skin: No rashes  Vascular Access:    LABS:  05-17    129<L>  |  89<L>  |  65<H>  ----------------------------<  149<H>  4.4   |  25  |  4.72<H>    Ca    10.2      17 May 2021 06:52  Phos  6.2     05-17  Mg     2.2     05-17      Creatinine Trend: 4.72 <--, 3.75 <--, 3.84 <--, 4.39 <--, 4.63 <--, 4.94 <--, 4.72 <--                        9.9    6.09  )-----------( 138      ( 17 May 2021 06:52 )             30.1     Urine Studies:        RADIOLOGY & ADDITIONAL STUDIES:   [facility] - S Collette / Donna S /D Aroldo/ S Elpidio/ ROSALIA Morocho/ Ben Escudero / URIEL Raymundo/ O Dominic  service -[phone], office [phone]  ---------------------------------------------------------------------------------------------------------------    Patient seen and examined bedside    Subjective and Objective: No overnight events, sob resolved. No complaints today.   urinated 400ml in am, none since then    Allergies: No Known Allergies      Hospital Medications:   MEDICATIONS  (STANDING):  allopurinol 100 milliGRAM(s) Oral daily  aspirin enteric coated 81 milliGRAM(s) Oral daily  atorvastatin 80 milliGRAM(s) Oral at bedtime  buMETAnide Injectable 2 milliGRAM(s) IV Push two times a day  chlorhexidine 2% Cloths 1 Application(s) Topical daily  dextrose 40% Gel 15 Gram(s) Oral once  dextrose 5%. 1000 milliLiter(s) (50 mL/Hr) IV Continuous <Continuous>  dextrose 5%. 1000 milliLiter(s) (100 mL/Hr) IV Continuous <Continuous>  dextrose 50% Injectable 25 Gram(s) IV Push once  dextrose 50% Injectable 12.5 Gram(s) IV Push once  dextrose 50% Injectable 25 Gram(s) IV Push once  gabapentin 300 milliGRAM(s) Oral three times a day  glucagon  Injectable 1 milliGRAM(s) IntraMuscular once  heparin   Injectable 5000 Unit(s) SubCutaneous every 8 hours  hydrALAZINE 50 milliGRAM(s) Oral three times a day  insulin glargine Injectable (LANTUS) 55 Unit(s) SubCutaneous at bedtime  insulin lispro (ADMELOG) corrective regimen sliding scale   SubCutaneous three times a day before meals  insulin lispro (ADMELOG) corrective regimen sliding scale   SubCutaneous at bedtime  insulin lispro Injectable (ADMELOG) 21 Unit(s) SubCutaneous three times a day before meals  isosorbide   dinitrate Tablet (ISORDIL) 10 milliGRAM(s) Oral three times a day  metoprolol succinate  milliGRAM(s) Oral two times a day  multivitamin 1 Tablet(s) Oral daily  senna 2 Tablet(s) Oral at bedtime      VITALS:  T(F): 97.9 (05-17-21 @ 13:28), Max: 97.9 (05-17-21 @ 13:28)  HR: 55 (05-17-21 @ 13:28)  BP: 134/54 (05-17-21 @ 13:28)  RR: 18 (05-17-21 @ 13:28)  SpO2: 99% (05-17-21 @ 13:28)  Wt(kg): --    05-16 @ 07:01  -  05-17 @ 07:00  --------------------------------------------------------  IN: 100 mL / OUT: 800 mL / NET: -700 mL      PHYSICAL EXAM:  Constitutional: NAD  HEENT: anicteric sclera, oropharynx clear  Neck: No JVD  Respiratory: CTAB, no wheezes, rales or rhonchi  Cardiovascular: S1, S2, RRR  Gastrointestinal: BS+, soft, NT/ND  Extremities: No cyanosis or clubbing. No peripheral edema  Neurological: A/O x 3, no focal deficits  Psychiatric: Normal mood, normal affect  : No CVA tenderness. No paredes.   Skin: No rashes  Vascular Access:    LABS:  05-17    129<L>  |  89<L>  |  65<H>  ----------------------------<  149<H>  4.4   |  25  |  4.72<H>    Ca    10.2      17 May 2021 06:52  Phos  6.2     05-17  Mg     2.2     05-17      Creatinine Trend: 4.72 <--, 3.75 <--, 3.84 <--, 4.39 <--, 4.63 <--, 4.94 <--, 4.72 <--                        9.9    6.09  )-----------( 138      ( 17 May 2021 06:52 )             30.1     Urine Studies:        RADIOLOGY & ADDITIONAL STUDIES:

## 2021-05-17 NOTE — PROVIDER CONTACT NOTE (OTHER) - BACKGROUND
admitted dyspnea
admitted for dyspnea
Patient admitted from ED, received 6.25 coreg PO and 40 mg IV lasix in emergency room.
Patient admitted for shortness of breath
Pt admitted for CHF exacerbation
admitted dyspnea
Pt s/p cath on 5/4 via right brachial
Patient admitted for shortness of breath, heart failure
Pt is diabetic, finger stick due for bedtime

## 2021-05-17 NOTE — PROGRESS NOTE ADULT - SUBJECTIVE AND OBJECTIVE BOX
Medications:  acetaminophen   Tablet .. 650 milliGRAM(s) Oral every 6 hours PRN  allopurinol 100 milliGRAM(s) Oral daily  aspirin enteric coated 81 milliGRAM(s) Oral daily  atorvastatin 80 milliGRAM(s) Oral at bedtime  buMETAnide Injectable 2 milliGRAM(s) IV Push two times a day  chlorhexidine 2% Cloths 1 Application(s) Topical daily  dextrose 40% Gel 15 Gram(s) Oral once  dextrose 5%. 1000 milliLiter(s) IV Continuous <Continuous>  dextrose 5%. 1000 milliLiter(s) IV Continuous <Continuous>  dextrose 50% Injectable 25 Gram(s) IV Push once  dextrose 50% Injectable 12.5 Gram(s) IV Push once  dextrose 50% Injectable 25 Gram(s) IV Push once  gabapentin 300 milliGRAM(s) Oral three times a day  glucagon  Injectable 1 milliGRAM(s) IntraMuscular once  heparin   Injectable 5000 Unit(s) SubCutaneous every 8 hours  hydrALAZINE 50 milliGRAM(s) Oral three times a day  insulin glargine Injectable (LANTUS) 55 Unit(s) SubCutaneous at bedtime  insulin lispro (ADMELOG) corrective regimen sliding scale   SubCutaneous three times a day before meals  insulin lispro (ADMELOG) corrective regimen sliding scale   SubCutaneous at bedtime  insulin lispro Injectable (ADMELOG) 21 Unit(s) SubCutaneous three times a day before meals  isosorbide   dinitrate Tablet (ISORDIL) 10 milliGRAM(s) Oral three times a day  ketotifen 0.025% Ophthalmic Solution 1 Drop(s) Both EYES two times a day PRN  metoprolol succinate  milliGRAM(s) Oral two times a day  multivitamin 1 Tablet(s) Oral daily  senna 2 Tablet(s) Oral at bedtime      Vitals:  Vital Signs Last 24 Hrs  T(C): 36.5 (17 May 2021 05:29), Max: 36.6 (16 May 2021 17:34)  T(F): 97.7 (17 May 2021 05:29), Max: 97.8 (16 May 2021 17:34)  HR: 59 (17 May 2021 05:29) (59 - 64)  BP: 115/64 (17 May 2021 05:29) (110/63 - 126/61)  BP(mean): --  RR: 17 (17 May 2021 05:29) ( - )  SpO2: 100% (17 May 2021 05:29) (100% - 100%)    Daily     Daily Weight in k.8 (17 May 2021 06:31)    I&O's Detail    16 May 2021 07:01  -  17 May 2021 07:00  --------------------------------------------------------  IN:    Oral Fluid: 100 mL  Total IN: 100 mL    OUT:    Voided (mL): 800 mL  Total OUT: 800 mL    Total NET: -700 mL          Physical Exam:     General: No distress. Comfortable.  HEENT: EOM intact.  Neck: Neck supple. JVP not elevated. No masses  Chest: Clear to auscultation bilaterally  CV: Normal S1 and S2. No murmurs, rub, or gallops. Radial pulses normal.  Abdomen: Soft, non-distended, non-tender  Skin: No rashes or skin breakdown  Neurology: Alert and oriented times three. Sensation intact  Psych: Affect normal    Labs:                        9.9    6.09  )-----------( 138      ( 17 May 2021 06:52 )             30.1         129<L>  |  89<L>  |  65<H>  ----------------------------<  149<H>  4.4   |  25  |  4.72<H>    Ca    10.2      17 May 2021 06:52  Phos  6.2       Mg     2.2                  Patient seen and examined. She feels well- no SOB at rest, orthopnea, PND, CP, palpitations.   Waiting to see if HD will be long term.   Urine output has slowed down, aprox 400 ml daily per patient.      Medications:  acetaminophen   Tablet .. 650 milliGRAM(s) Oral every 6 hours PRN  allopurinol 100 milliGRAM(s) Oral daily  aspirin enteric coated 81 milliGRAM(s) Oral daily  atorvastatin 80 milliGRAM(s) Oral at bedtime  buMETAnide Injectable 2 milliGRAM(s) IV Push two times a day  chlorhexidine 2% Cloths 1 Application(s) Topical daily  dextrose 40% Gel 15 Gram(s) Oral once  dextrose 5%. 1000 milliLiter(s) IV Continuous <Continuous>  dextrose 5%. 1000 milliLiter(s) IV Continuous <Continuous>  dextrose 50% Injectable 25 Gram(s) IV Push once  dextrose 50% Injectable 12.5 Gram(s) IV Push once  dextrose 50% Injectable 25 Gram(s) IV Push once  gabapentin 300 milliGRAM(s) Oral three times a day  glucagon  Injectable 1 milliGRAM(s) IntraMuscular once  heparin   Injectable 5000 Unit(s) SubCutaneous every 8 hours  hydrALAZINE 50 milliGRAM(s) Oral three times a day  insulin glargine Injectable (LANTUS) 55 Unit(s) SubCutaneous at bedtime  insulin lispro (ADMELOG) corrective regimen sliding scale   SubCutaneous three times a day before meals  insulin lispro (ADMELOG) corrective regimen sliding scale   SubCutaneous at bedtime  insulin lispro Injectable (ADMELOG) 21 Unit(s) SubCutaneous three times a day before meals  isosorbide   dinitrate Tablet (ISORDIL) 10 milliGRAM(s) Oral three times a day  ketotifen 0.025% Ophthalmic Solution 1 Drop(s) Both EYES two times a day PRN  metoprolol succinate  milliGRAM(s) Oral two times a day  multivitamin 1 Tablet(s) Oral daily  senna 2 Tablet(s) Oral at bedtime      Vitals:  Vital Signs Last 24 Hrs  T(C): 36.5 (17 May 2021 05:29), Max: 36.6 (16 May 2021 17:34)  T(F): 97.7 (17 May 2021 05:29), Max: 97.8 (16 May 2021 17:34)  HR: 59 (17 May 2021 05:29) (59 - 64)  BP: 115/64 (17 May 2021 05:29) (110/63 - 126/61)  BP(mean): --  RR: 17 (17 May 2021 05:29) (17 - 17)  SpO2: 100% (17 May 2021 05:29) (100% - 100%)    Daily     Daily Weight in k.8 (17 May 2021 06:31)    I&O's Detail    16 May 2021 07:01  -  17 May 2021 07:00  --------------------------------------------------------  IN:    Oral Fluid: 100 mL  Total IN: 100 mL    OUT:    Voided (mL): 800 mL  Total OUT: 800 mL    Total NET: -700 mL          Physical Exam:     General: No distress. Comfortable.  HEENT: EOM intact.  Neck: Neck supple. JVP not elevated. No masses  Chest: Clear to auscultation bilaterally  CV: Normal S1 and S2. No murmurs, rub, or gallops. Radial pulses normal. Trace b/l tight LE edema. Warm b/l.   Abdomen: Soft, non-distended, non-tender  Skin: No rashes or skin breakdown  Neurology: Alert and oriented times three. Sensation intact  Psych: Affect normal    Labs:                        9.9    6.09  )-----------( 138      ( 17 May 2021 06:52 )             30.1         129<L>  |  89<L>  |  65<H>  ----------------------------<  149<H>  4.4   |  25  |  4.72<H>    Ca    10.2      17 May 2021 06:52  Phos  6.2       Mg     2.2

## 2021-05-17 NOTE — PROGRESS NOTE ADULT - PROBLEM SELECTOR PLAN 2
- Initiated on HD for volume overload   - S/p Cedric on 5/13.  - Diuresis as above. HD as per nephro  - Renal to make final decision about need for permacath pending response to HD sessions

## 2021-05-17 NOTE — PROGRESS NOTE ADULT - PROBLEM SELECTOR PLAN 1
Pt with CKD3, follows with Dr Escudero in outpt clinic.   Baseline Cr ~2 from earlier this year  Worsening renal failure, requiring HD start.   No  obstruction on renal US. No significant proteinuria on UA. BP stable, no obvious nephrotoxin.   s/p shiley cath placement, had third HD 5/15, net UF 0.8kg achieved.   unable to accommodate for hd today due to scheduling issue. plan for next HD 5/18 AM,  w/2k bath, inc uf 1.5kg as tolerated by bp. consider dec hydralazine dose and hold dose before hd to allow bp tolerance for higher ufr   monitor for renal recovery, u/o   If BUN/Cr remains elevated, may need to change shiley to tunneled cath, and plan for outpt HD unit placement.   monitor daily BMP, strict I/O.   dose all meds for eGFR<15ml/min.   avoid ACEi/ARB/NSAIDs/Nephrotoxics.

## 2021-05-18 LAB
ANION GAP SERPL CALC-SCNC: 13 MMOL/L — SIGNIFICANT CHANGE UP (ref 7–14)
BUN SERPL-MCNC: 37 MG/DL — HIGH (ref 7–23)
CALCIUM SERPL-MCNC: 9.7 MG/DL — SIGNIFICANT CHANGE UP (ref 8.4–10.5)
CHLORIDE SERPL-SCNC: 93 MMOL/L — LOW (ref 98–107)
CO2 SERPL-SCNC: 26 MMOL/L — SIGNIFICANT CHANGE UP (ref 22–31)
CREAT SERPL-MCNC: 3.5 MG/DL — HIGH (ref 0.5–1.3)
GLUCOSE BLDC GLUCOMTR-MCNC: 111 MG/DL — HIGH (ref 70–99)
GLUCOSE SERPL-MCNC: 138 MG/DL — HIGH (ref 70–99)
HBV SURFACE AB SER-ACNC: <3 MIU/ML — LOW
HCT VFR BLD CALC: 29.5 % — LOW (ref 34.5–45)
HGB BLD-MCNC: 9.7 G/DL — LOW (ref 11.5–15.5)
MAGNESIUM SERPL-MCNC: 2.1 MG/DL — SIGNIFICANT CHANGE UP (ref 1.6–2.6)
MCHC RBC-ENTMCNC: 28.2 PG — SIGNIFICANT CHANGE UP (ref 27–34)
MCHC RBC-ENTMCNC: 32.9 GM/DL — SIGNIFICANT CHANGE UP (ref 32–36)
MCV RBC AUTO: 85.8 FL — SIGNIFICANT CHANGE UP (ref 80–100)
NRBC # BLD: 0 /100 WBCS — SIGNIFICANT CHANGE UP
NRBC # FLD: 0 K/UL — SIGNIFICANT CHANGE UP
PHOSPHATE SERPL-MCNC: 4.2 MG/DL — SIGNIFICANT CHANGE UP (ref 2.5–4.5)
PLATELET # BLD AUTO: 102 K/UL — LOW (ref 150–400)
POTASSIUM SERPL-MCNC: 4.1 MMOL/L — SIGNIFICANT CHANGE UP (ref 3.5–5.3)
POTASSIUM SERPL-SCNC: 4.1 MMOL/L — SIGNIFICANT CHANGE UP (ref 3.5–5.3)
RBC # BLD: 3.44 M/UL — LOW (ref 3.8–5.2)
RBC # FLD: 13.2 % — SIGNIFICANT CHANGE UP (ref 10.3–14.5)
SARS-COV-2 RNA SPEC QL NAA+PROBE: SIGNIFICANT CHANGE UP
SODIUM SERPL-SCNC: 132 MMOL/L — LOW (ref 135–145)
WBC # BLD: 5.94 K/UL — SIGNIFICANT CHANGE UP (ref 3.8–10.5)
WBC # FLD AUTO: 5.94 K/UL — SIGNIFICANT CHANGE UP (ref 3.8–10.5)

## 2021-05-18 PROCEDURE — 99232 SBSQ HOSP IP/OBS MODERATE 35: CPT

## 2021-05-18 PROCEDURE — 99446 NTRPROF PH1/NTRNET/EHR 5-10: CPT

## 2021-05-18 PROCEDURE — 99233 SBSQ HOSP IP/OBS HIGH 50: CPT

## 2021-05-18 RX ORDER — INSULIN LISPRO 100/ML
10 VIAL (ML) SUBCUTANEOUS ONCE
Refills: 0 | Status: COMPLETED | OUTPATIENT
Start: 2021-05-18 | End: 2021-05-18

## 2021-05-18 RX ORDER — INSULIN LISPRO 100/ML
10 VIAL (ML) SUBCUTANEOUS
Refills: 0 | Status: DISCONTINUED | OUTPATIENT
Start: 2021-05-18 | End: 2021-05-22

## 2021-05-18 RX ADMIN — ISOSORBIDE DINITRATE 10 MILLIGRAM(S): 5 TABLET ORAL at 21:35

## 2021-05-18 RX ADMIN — HEPARIN SODIUM 5000 UNIT(S): 5000 INJECTION INTRAVENOUS; SUBCUTANEOUS at 13:11

## 2021-05-18 RX ADMIN — GABAPENTIN 300 MILLIGRAM(S): 400 CAPSULE ORAL at 05:10

## 2021-05-18 RX ADMIN — Medication 1 TABLET(S): at 09:27

## 2021-05-18 RX ADMIN — Medication 10 UNIT(S): at 09:20

## 2021-05-18 RX ADMIN — BUMETANIDE 2 MILLIGRAM(S): 0.25 INJECTION INTRAMUSCULAR; INTRAVENOUS at 17:03

## 2021-05-18 RX ADMIN — Medication 81 MILLIGRAM(S): at 09:27

## 2021-05-18 RX ADMIN — ATORVASTATIN CALCIUM 80 MILLIGRAM(S): 80 TABLET, FILM COATED ORAL at 21:34

## 2021-05-18 RX ADMIN — GABAPENTIN 300 MILLIGRAM(S): 400 CAPSULE ORAL at 21:35

## 2021-05-18 RX ADMIN — ISOSORBIDE DINITRATE 10 MILLIGRAM(S): 5 TABLET ORAL at 13:15

## 2021-05-18 RX ADMIN — HEPARIN SODIUM 5000 UNIT(S): 5000 INJECTION INTRAVENOUS; SUBCUTANEOUS at 21:35

## 2021-05-18 RX ADMIN — Medication 25 MILLIGRAM(S): at 21:34

## 2021-05-18 RX ADMIN — CHLORHEXIDINE GLUCONATE 1 APPLICATION(S): 213 SOLUTION TOPICAL at 09:31

## 2021-05-18 RX ADMIN — GABAPENTIN 300 MILLIGRAM(S): 400 CAPSULE ORAL at 13:04

## 2021-05-18 RX ADMIN — HEPARIN SODIUM 5000 UNIT(S): 5000 INJECTION INTRAVENOUS; SUBCUTANEOUS at 05:10

## 2021-05-18 RX ADMIN — Medication 25 MILLIGRAM(S): at 13:15

## 2021-05-18 RX ADMIN — SENNA PLUS 2 TABLET(S): 8.6 TABLET ORAL at 21:35

## 2021-05-18 RX ADMIN — Medication 100 MILLIGRAM(S): at 13:04

## 2021-05-18 RX ADMIN — INSULIN GLARGINE 55 UNIT(S): 100 INJECTION, SOLUTION SUBCUTANEOUS at 21:55

## 2021-05-18 RX ADMIN — Medication 10 UNIT(S): at 12:46

## 2021-05-18 RX ADMIN — Medication 100 MILLIGRAM(S): at 17:03

## 2021-05-18 RX ADMIN — Medication 2: at 17:52

## 2021-05-18 NOTE — PROGRESS NOTE ADULT - PROBLEM SELECTOR PLAN 6
-FS improved, noted to have <100 FSG readings and was given decreased premeal insulin.  Endocrinology team made adjustments today   -Appreciate endo recs.  -Current on basal/bolus w/ ISS. Titrate as per endo

## 2021-05-18 NOTE — PROGRESS NOTE ADULT - PROBLEM SELECTOR PLAN 1
- Improving volume status with 24 output with HD/voiding 2200 with net negative 1700 ml. Goal to remove 2L of fluid daily.   - Voiding twice a day  - Hydralazine was decreased to 25 mg po tid on 5/17 and may hold hydralazine dose on the AM of HD to facilitate more fluid removal.  - BP stable 111//68 today from 65/4.72  - Continue Bumex 2 IV BID.   - continue isordil 10 tid  -Continue Toprol 100 mg po BID.   -Keep k 4.0-5.0 and mag 2.0. DAVIE/creat with improvement today 37/3.5  -Daily standing weights and strict I/O. Weight today 222.8 lbs from 232.1 lbs on 5/2/21  -Await renal team decision regarding HD being long term vs temporary.  - Further recommendations to follow. - Improving volume status with 24 output with HD/voiding 2200 with net negative 1700 ml. Goal to remove 2L of fluid daily.   - Voiding twice a day  - Hydralazine was decreased to 25 mg po tid on 5/17 and may hold hydralazine dose on the AM of HD to facilitate more fluid removal.  - BP stable 111//68 today from 65/4.72  - Continue Bumex 2 IV BID.   - continue isordil 10 tid  -Continue Toprol 100 mg po BID.   -Keep k 4.0-5.0 and mag 2.0. BUN and creat with improvement today 37/3.5  -Daily standing weights and strict I/O. Weight today 222.8 lbs from 232.1 lbs on 5/2/21  -Await renal team decision regarding HD being long term vs temporary.  - Further recommendations to follow.

## 2021-05-18 NOTE — PROGRESS NOTE ADULT - SUBJECTIVE AND OBJECTIVE BOX
Subjective: Patient seen and examined. Pt sitting OOB to the chair. States she had HD yesterday, unsure if having today. Hydralazine was decreased to 25 mg tid to allow for higher B/P during HD.  Denies chest pain, palpitations and orthopnea/shortness of breath. States appetite is good. Voids 2 times a day, usually 400 ml.     Medications:  acetaminophen   Tablet .. 650 milliGRAM(s) Oral every 6 hours PRN  allopurinol 100 milliGRAM(s) Oral daily  aspirin enteric coated 81 milliGRAM(s) Oral daily  atorvastatin 80 milliGRAM(s) Oral at bedtime  buMETAnide Injectable 2 milliGRAM(s) IV Push two times a day  chlorhexidine 2% Cloths 1 Application(s) Topical daily  dextrose 40% Gel 15 Gram(s) Oral once  dextrose 5%. 1000 milliLiter(s) IV Continuous <Continuous>  dextrose 5%. 1000 milliLiter(s) IV Continuous <Continuous>  dextrose 50% Injectable 25 Gram(s) IV Push once  dextrose 50% Injectable 12.5 Gram(s) IV Push once  dextrose 50% Injectable 25 Gram(s) IV Push once  gabapentin 300 milliGRAM(s) Oral three times a day  glucagon  Injectable 1 milliGRAM(s) IntraMuscular once  heparin   Injectable 5000 Unit(s) SubCutaneous every 8 hours  hydrALAZINE 25 milliGRAM(s) Oral three times a day  insulin glargine Injectable (LANTUS) 55 Unit(s) SubCutaneous at bedtime  insulin lispro (ADMELOG) corrective regimen sliding scale   SubCutaneous three times a day before meals  insulin lispro (ADMELOG) corrective regimen sliding scale   SubCutaneous at bedtime  insulin lispro Injectable (ADMELOG) 21 Unit(s) SubCutaneous three times a day before meals  isosorbide   dinitrate Tablet (ISORDIL) 10 milliGRAM(s) Oral three times a day  ketotifen 0.025% Ophthalmic Solution 1 Drop(s) Both EYES two times a day PRN  metoprolol succinate  milliGRAM(s) Oral two times a day  multivitamin 1 Tablet(s) Oral daily  senna 2 Tablet(s) Oral at bedtime    Vital Signs Last 24 Hrs  T(C): 37.1 (18 May 2021 11:17), Max: 37.2 (18 May 2021 06:32)  T(F): 98.7 (18 May 2021 11:17), Max: 99 (18 May 2021 06:32)  HR: 60 (18 May 2021 11:17) (55 - 65)  BP: 131/68 (18 May 2021 11:17) (111/59 - 144/78)  BP(mean): --  RR: 18 (18 May 2021 11:17) (18 - 18)  SpO2: 96% (18 May 2021 11:17) (96% - 100%)    Daily     Daily Weight in k.1 (18 May 2021 06:29)    I&O's Summary    17 May 2021 07:01  -  18 May 2021 07:00  --------------------------------------------------------  IN: 500 mL / OUT: 2200 mL / NET: -1700 mL        Tele:  NSR 69-70's with no events    General: No distress. Comfortable.  HEENT: normocephalic  Neck: Neck supple. JVP not elevated sitting OOB to the chair  Chest: Clear to auscultation bilaterally, unlabored, right chest dialysis catheter intact   CV: RRR, Normal S1 and S2. Radial pulses normal. trace LE edema  Abdomen: Soft, non-distended, non-tender  Skin: No rashes   Neurology: Alert and oriented times three.   Psych: Affect normal    Labs:                        9.7    5.94  )-----------( 102      ( 18 May 2021 06:28 )             29.5     0518    132<L>  |  93<L>  |  37<H>  ----------------------------<  138<H>  4.1   |  26  |  3.50<H>    Ca    9.7      18 May 2021 06:28  Phos  4.2     05-18  Mg     2.1     05-18      < from: Transthoracic Echocardiogram (21 @ 14:49) >  Blood Pressure: 138/69 mmHg  Height: 157 cm  Weight: 106 kg  BSA: 2.1 m2  ------------------------------------------------------------------------  PROCEDURE: Transthoracic echocardiogram with 2-D, M-Mode  and complete spectral and color flow Doppler.  INDICATION: Heart failure, unspecified (I50.9)  ------------------------------------------------------------------------  DIMENSIONS:  Dimensions:     Normal Values:  LA:     3.6 cm    2.0 - 4.0 cm  Ao:     3.0 cm    2.0 - 3.8 cm  SEPTUM: 0.8 cm    0.6 - 1.2 cm  PWT:    0.8 cm    0.6 - 1.1 cm  LVIDd:  4.4 cm    3.0 - 5.6 cm  LVIDs:  2.8 cm    1.8 - 4.0 cm  Derived Variables:  LVMI: 53 g/m2  RWT: 0.36  Fractional short: 36 %  Ejection Fraction (Teicholtz): 66 %  ------------------------------------------------------------------------  OBSERVATIONS:  Mitral Valve: Mitral annular calcification, otherwise  normal mitral valve. Minimal mitral regurgitation.  Aortic Root: Normal aortic root.  Aortic Valve: Aortic valve leaflet morphology not well  visualized. Peak transaortic valve gradient equals 30 mm  Hg, mean transaortic valve gradient equals 18 mm Hg,  estimated aortic valve area equals 1.6 sqcm (by continuity  equation), consistent with mild aortic stenosis.  Left Atrium: Normal left atrium.  LA volume index = 26  cc/m2.  Left Ventricle: Endocardium not well visualized; grossly  normal left ventricular systolic function. Normal left  ventricular internal dimensions and wall thicknesses. Mild  diastolic dysfunction (Stage I).  Right Heart: Normal right atrium. The right ventricle is  not well visualized; grossly normal right ventricular  systolic function. Normal tricuspid valve. Minimal  tricuspid regurgitation. Normal pulmonic valve. Minimal  pulmonic regurgitation.  Pericardium/PleuraNormal pericardium with no pericardial  effusion.  ------------------------------------------------------------------------  CONCLUSIONS:  1. Mitral annular calcification, otherwise normal mitral  valve. Minimal mitral regurgitation.  2. Aortic valve leafletmorphology not well visualized.  Peak transaortic valve gradient equals 30 mm Hg, mean  transaortic valve gradient equals 18 mm Hg, estimated  aortic valve area equals 1.6 sqcm (by continuity equation),  consistent with mild aortic stenosis.  3. Normal left ventricular internal dimensions and wall  thicknesses.  4. Endocardium not well visualized; grossly normal left  ventricular systolic function.  5. Mild diastolic dysfunction (Stage I).  6. The right ventricle is not well visualized; grossly  normal right ventricular systolic function.  ------------------------------------------------------------------------  Confirmed on  5/3/2021 - 16:19:40 by Naresh Reilly M.D.,  Formerly [facility], [place]  ------------------------------------------------------------------------    < from: Cardiac Cath Lab - Adult (21 @ 12:28) >  e Physician(s):  Ag Garza M.D.  Referring Physician:  WOJCIECH Carrion M.D.  INDICATIONS: Heart failure; unspecified.  HISTORY: Anemia. Peripheral neuropathy. Benign thyroid nodule. History  includes peripheral vascular disease. The patient has hypertension,  insulin-controlled diabetes, renal failure (not requiring dialysis),  medication-treated dyslipidemia, and obesity.  PRIOR DIAGNOSTIC TEST RESULTS: Echocardiography (transthoracic) performed (  2021): 66 % estimated ejection fraction.  PROCEDURE:  --  Sonosite - Diagnostic.  --  Right heart catheterization.  TECHNIQUE: The risks and alternatives of the procedures and conscious  sedation were explained to the patient and informed consent was obtained.  Cardiac catheterization performed electively.  Sonosite - Diagnostic. Right brachial vein was identified and access was  obtained using ultrasound guidance. Right brachial vein access. Local  anesthetic given. The puncture site was infiltrated with 2 % lidocaine. A  5 Fr. Radial Glidesheath was inserted in the vessel. Right heart  catheterization. The procedure was performed utilizing a 5 Fr. X 110cm Bln  Wedge Pressure Cath catheter. RADIATION EXPOSURE: 1.9 min.  MEDICATIONS GIVEN: 2% Lidocaine, 3 ml, subcutaneously. 0.9% Normal saline,  10 ml, IV.  HEMODYNAMICS: There is severe pulmonary hypertension.  COMPLICATIONS: There were no complications.  DIAGNOSTIC RECOMMENDATIONS: Medical management is recommended.  Prepared and signed by  Ag Garza M.D.  Signed 2021 15:57:03  HEMODYNAMIC TABLES  Pressures:  Baseline  Pressures:  - HR: 67  Pressures:  - Rhythm:  Pressures:  -- Aortic Pressure (S/D/M): 163/78/107  Pressures:  -- Pulmonary Artery (S/D/M): 69/21/44  Pressures:  -- Pulmonary Capillary Wedge: 39/33/28  Pressures:  -- Right Atrium (a/v/M): 26/23/20  Pressures:  -- Right Ventricle (s/edp): 68/26/--  O2 Sats:  Baseline  O2 Sats:  - HR: 67  O2 Sats:  - Rhythm:  O2 Sats:  -- AO: 9.5/98/12.66  O2 Sats:  -- PA: 9.5/74.4/9.61  Outputs:  Baseline  Outputs:  -- CALCULATIONS[age]  Outputs:  -- CALCULATIONS: Body Surface Area: 2.05  Outputs:  -- CALCULATIONS: Height in cm: 158.00  Outputs:  -- CALCULATIONS: Sex: Female  Outputs:  -- CALCULATIONS: Weight in k.70  Outputs:  -- OUTPUTS: CO by Devante: 8.42  Outputs:  -- OUTPUTS: Devante cardiac index: 4.10  Outputs:  -- OUTPUTS: O2 consumption: 256.61  Outputs:  -- OUTPUTS: Vo2 Indexed: 125.00  Outputs:  -- RESISTANCES: Left ventricular stroke work: 134.96  Outputs:  -- RESISTANCES: Left Ventricular Stroke Work index: 65.74  Outputs:  -- RESISTANCES: Pulmonary vascular index (dsc): 312.15  Outputs:  -- RESISTANCES: Pulmonary vascular index (Wood Units): 3.90  Outputs:  -- RESISTANCES: Pulmonary vascular resistance (dsc): 152.06  Outputs:  -- RESISTANCES: Pulmonary vascular resistance (Wood Units): 1.90  Outputs:  -- RESISTANCES: PVR_SVR Ratio: 0.18  Outputs:  -- RESISTANCES: Right ventricular stroke work: 41.00  Outputs:-- RESISTANCES: Right ventricular stroke work index: 19.97  Outputs:  -- RESISTANCES: Systemic vascular index (dsc): 1697.33  Outputs:  -- RESISTANCES: Systemic vascular index (Wood Units): 21.22  Outputs:  -- RESISTANCES: Systemic vascular resistance (dsc): 826.80  Outputs:  -- RESISTANCES: Systemic vascular resistance (Wood Units): 10.34  Outputs:  -- RESISTANCES: Total pulmonary index (dsc): 858.42  Outputs:  -- RESISTANCES: Total pulmonary index (Wood Units): 10.73  Outputs:  -- RESISTANCES: Total pulmonary resistance (dsc): 418.15  Outputs:  -- RESISTANCES: Total pulmonary resistance (Wood Units): 5.23  Outputs:  -- RESISTANCES: Total vascular index (Wood Units): 26.10  Outputs:  -- RESISTANCES: Total vascular resistance (dsc): 1016.87  Outputs:  -- RESISTANCES: Total vascular resistance (Wood Units): 12.71  Outputs:  -- RESISTANCES: Total vascular resistance index (dsc): 2087.52  Outputs:  -- RESISTANCES: TPR_TVR Ratio: 0.41  Outputs:  -- SHUNTS: Pulmonary flow: 8.42  Outputs:  -- SHUNTS: Qp Indexed: 4.10  Outputs:  -- SHUNTS: Qs Indexed: 4.10  Outputs:  -- SHUNTS: Systemic flow: 8.42    < from: Xray Chest 1 View- PORTABLE-Urgent (Xray Chest 1 View- PORTABLE-Urgent .) (.21 @ 11:04) >  XAM:  XR CHEST PORTABLE URGENT 1V        PROCEDURE DATE:  2021         INTERPRETATION:  TIME OF EXAM: 2021 at 10:56 AM    CLINICAL INFORMATION: CHF    TECHNIQUE:   Portable chest    INTERPRETATION:    Lungs are free of focal consolidations. Heart is not enlarged considering technique. No effusions or congestion to indicate CHF. Low lung volumes.      COMPARISON:  2013      IMPRESSION:  Clear lungs.    < from: US Thyroid (21 @ 11:04) >  EXAM:  US THYROID ONLY        PROCEDURE DATE:  2021         INTERPRETATION:  CLINICAL INFORMATION: Thyroid nodules    COMPARISON: None available.    TECHNIQUE: Sonography of the thyroid.    FINDINGS:  Right Lobe: 4.5 cm x  1.6 cm x 2.0 cm. Several subcentimeter colloid nodules. Lower pole mildly echo poor nodule 13 x 12 x 12 mm    Left Lobe: 5.2 cm x 1.4 cm x 2.3 cm. Isoechoic nodule lower pole 1.9 x 1.3 x 1.5 cm. Upper pole 12 mm colloid nodule    Isthmus: 0.4 cm.    Cervical Lymph Nodes: No enlarged or abnormal morphology cervical nodes.    IMPRESSION:    Bilateral thyroid nodules without suspicious features. May obtain follow-up in one year

## 2021-05-18 NOTE — PROGRESS NOTE ADULT - SUBJECTIVE AND OBJECTIVE BOX
Nephrology Followup Note - [phone] - Dr Thompson / Dr Murdock / Dr Morocho / Dr Kendrick / Dr Magallanes / Dr Alfaro / Dr Escudero / Dr Raymundo  Pt seen and examined earlier this morning, at bedside  No acute events overnight. No complaints.     Allergies:  No Known Allergies    Hospital Medications:   MEDICATIONS  (STANDING):  allopurinol 100 milliGRAM(s) Oral daily  aspirin enteric coated 81 milliGRAM(s) Oral daily  atorvastatin 80 milliGRAM(s) Oral at bedtime  buMETAnide Injectable 2 milliGRAM(s) IV Push two times a day  chlorhexidine 2% Cloths 1 Application(s) Topical daily  dextrose 40% Gel 15 Gram(s) Oral once  dextrose 5%. 1000 milliLiter(s) (50 mL/Hr) IV Continuous <Continuous>  dextrose 5%. 1000 milliLiter(s) (100 mL/Hr) IV Continuous <Continuous>  dextrose 50% Injectable 25 Gram(s) IV Push once  dextrose 50% Injectable 12.5 Gram(s) IV Push once  dextrose 50% Injectable 25 Gram(s) IV Push once  gabapentin 300 milliGRAM(s) Oral three times a day  glucagon  Injectable 1 milliGRAM(s) IntraMuscular once  heparin   Injectable 5000 Unit(s) SubCutaneous every 8 hours  hydrALAZINE 25 milliGRAM(s) Oral three times a day  insulin glargine Injectable (LANTUS) 55 Unit(s) SubCutaneous at bedtime  insulin lispro (ADMELOG) corrective regimen sliding scale   SubCutaneous three times a day before meals  insulin lispro (ADMELOG) corrective regimen sliding scale   SubCutaneous at bedtime  insulin lispro Injectable (ADMELOG) 10 Unit(s) SubCutaneous three times a day before meals  isosorbide   dinitrate Tablet (ISORDIL) 10 milliGRAM(s) Oral three times a day  metoprolol succinate  milliGRAM(s) Oral two times a day  multivitamin 1 Tablet(s) Oral daily  senna 2 Tablet(s) Oral at bedtime    VITALS:  T(F): 98.3 (05-18-21 @ 17:03), Max: 99 (05-18-21 @ 06:32)  HR: 66 (05-18-21 @ 17:03)  BP: 113/60 (05-18-21 @ 17:03)  RR: 18 (05-18-21 @ 17:03)  SpO2: 98% (05-18-21 @ 17:03)  Wt(kg): --    05-17 @ 07:01  -  05-18 @ 07:00  --------------------------------------------------------  IN: 500 mL / OUT: 2200 mL / NET: -1700 mL    PHYSICAL EXAM:  Constitutional: NAD  HEENT: anicteric sclera, oropharynx clear, MMM  Neck: No JVD  Respiratory: CTAB, no wheezes, rales or rhonchi  Cardiovascular: S1, S2, RRR  Gastrointestinal: BS+, soft, NT/ND  Extremities: No cyanosis or clubbing. No peripheral edema  Neurological: A/O x 3, no focal deficits  Psychiatric: Normal mood, normal affect  : No CVA tenderness. No paredes.   Skin: No rashes    LABS:  05-18    132<L>  |  93<L>  |  37<H>  ----------------------------<  138<H>  4.1   |  26  |  3.50<H>    Ca    9.7      18 May 2021 06:28  Phos  4.2     05-18  Mg     2.1     05-18      Creatinine Trend: 3.50 <--, 4.72 <--, 3.75 <--, 3.84 <--, 4.39 <--, 4.63 <--, 4.94 <--                        9.7    5.94  )-----------( 102      ( 18 May 2021 06:28 )             29.5     Urine Studies:      RADIOLOGY & ADDITIONAL STUDIES:

## 2021-05-18 NOTE — PROGRESS NOTE ADULT - PROBLEM SELECTOR PLAN 1
Pt with CKD3, follows with Dr Escudero in outpt clinic.   Baseline Cr ~2 from earlier this year  Worsening renal failure, requiring HD start.   No  obstruction on renal US. No significant proteinuria on UA. BP stable, no obvious nephrotoxin.   s/p shiley cath placement; Dialyzed last night without acute events or cramping.   Plan for next HD thurs 5/20  BUN/Cr remains elevated, would recommend changing shiley to tunneled cath, and plan for outpt HD unit placement.   monitor daily BMP, strict I/O.   dose all meds for eGFR<15ml/min.   avoid ACEi/ARB/NSAIDs/Nephrotoxics.

## 2021-05-18 NOTE — CHART NOTE - NSCHARTNOTEFT_GEN_A_CORE
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 Endocrine consulted for uncontrolled DM2 with hyperglycemia inpatient.    Contacted by primary RN with concern for low FS, patient has been eating meals and tolerating; but having intermittent FS below 100 mg/dl.   Last night, FS 91 mg/dl, pre-meal Admelog held (21 units) for dinner although patient ate per RN. FS post meal was 140 mg/dl   Patient received Admelog 10 units instead of standing 21 units for breakfast with good response. Plans to eat lunch - please give Admelog 10 units x 1 before lunch today instead of standing 21 units  Moving forward - recommend to decrease Admelog to 10 units TID before meals (Hold if NPO/not eating meal). Will follow and adjust as needed   Continue Admelog MODERATE dose correctional scale before meals, moderate dose at bedtime  Fasting glucose has been stable - 138 mg/dl this AM. Recommend to continue Lantus 55 units SQ qHS   See prior endocrine progress notes for complete plan of care including discharge planning  To follow    CAPILLARY BLOOD GLUCOSE    POCT Blood Glucose.: 143 mg/dL (18 May 2021 12:04)  POCT Blood Glucose.: 111 mg/dL (18 May 2021 08:40)  POCT Blood Glucose.: 122 mg/dL (17 May 2021 21:28)  POCT Blood Glucose.: 140 mg/dL (17 May 2021 19:19)  POCT Blood Glucose.: 91 mg/dL (17 May 2021 17:00)  POCT Blood Glucose.: 202 mg/dL (17 May 2021 12:36)    05-18    132<L>  |  93<L>  |  37<H>  ----------------------------<  138<H>  4.1   |  26  |  3.50<H>    Ca    9.7      18 May 2021 06:28  Phos  4.2     05-18  Mg     2.1     05-18      MEDICATIONS  (STANDING):  allopurinol 100 milliGRAM(s) Oral daily  aspirin enteric coated 81 milliGRAM(s) Oral daily  atorvastatin 80 milliGRAM(s) Oral at bedtime  buMETAnide Injectable 2 milliGRAM(s) IV Push two times a day  chlorhexidine 2% Cloths 1 Application(s) Topical daily  dextrose 40% Gel 15 Gram(s) Oral once  dextrose 5%. 1000 milliLiter(s) (50 mL/Hr) IV Continuous <Continuous>  dextrose 5%. 1000 milliLiter(s) (100 mL/Hr) IV Continuous <Continuous>  dextrose 50% Injectable 25 Gram(s) IV Push once  dextrose 50% Injectable 12.5 Gram(s) IV Push once  dextrose 50% Injectable 25 Gram(s) IV Push once  gabapentin 300 milliGRAM(s) Oral three times a day  glucagon  Injectable 1 milliGRAM(s) IntraMuscular once  heparin   Injectable 5000 Unit(s) SubCutaneous every 8 hours  hydrALAZINE 25 milliGRAM(s) Oral three times a day  insulin glargine Injectable (LANTUS) 55 Unit(s) SubCutaneous at bedtime  insulin lispro (ADMELOG) corrective regimen sliding scale   SubCutaneous three times a day before meals  insulin lispro (ADMELOG) corrective regimen sliding scale   SubCutaneous at bedtime  insulin lispro Injectable (ADMELOG). 10 Unit(s) SubCutaneous once  isosorbide   dinitrate Tablet (ISORDIL) 10 milliGRAM(s) Oral three times a day  metoprolol succinate  milliGRAM(s) Oral two times a day  multivitamin 1 Tablet(s) Oral daily  senna 2 Tablet(s) Oral at bedtime    A1C with Estimated Average Glucose Result: 6.8 % (04-30-21 @ 07:41)    Diet, Regular:   Consistent Carbohydrate {No Snacks} (CSTCHO)  DASH/TLC {Sodium & Cholesterol Restricted} (DASH)  1500mL Fluid Restriction ([id number])  For patients receiving Renal Replacement - No Protein Restr, No Conc K, No Conc Phos, Low Sodium (RENAL)  No Dairy (05-14-21 @ 16:52)    Jo Harrison  Nurse Practitioner  Division of Endocrinology & Diabetes  In house pager #[phone]/long range pager #[phone]    If before 9AM or after 6PM, or on weekends/holidays, please call endocrine answering service for assistance ([phone]).  For nonurgent matters email [email or web address] for assistance.

## 2021-05-18 NOTE — PROGRESS NOTE ADULT - SUBJECTIVE AND OBJECTIVE BOX
[facility] Division of Hospital Medicine  Areli Celeste DO  Pager (URIEL-JEYSON, 5P-8O): [phone]    Patient is a 71y old  Female who presents with a chief complaint of Worsening SOB (18 May 2021 12:08)    SUBJECTIVE / OVERNIGHT EVENTS: Pt feels well, tolerated HD session well yesterday, denies acute complaints.  Voiding small amounts of urine.  Denies CP/SOB.     MEDICATIONS  (STANDING):  allopurinol 100 milliGRAM(s) Oral daily  aspirin enteric coated 81 milliGRAM(s) Oral daily  atorvastatin 80 milliGRAM(s) Oral at bedtime  buMETAnide Injectable 2 milliGRAM(s) IV Push two times a day  chlorhexidine 2% Cloths 1 Application(s) Topical daily  dextrose 40% Gel 15 Gram(s) Oral once  dextrose 5%. 1000 milliLiter(s) (50 mL/Hr) IV Continuous <Continuous>  dextrose 5%. 1000 milliLiter(s) (100 mL/Hr) IV Continuous <Continuous>  dextrose 50% Injectable 25 Gram(s) IV Push once  dextrose 50% Injectable 12.5 Gram(s) IV Push once  dextrose 50% Injectable 25 Gram(s) IV Push once  gabapentin 300 milliGRAM(s) Oral three times a day  glucagon  Injectable 1 milliGRAM(s) IntraMuscular once  heparin   Injectable 5000 Unit(s) SubCutaneous every 8 hours  hydrALAZINE 25 milliGRAM(s) Oral three times a day  insulin glargine Injectable (LANTUS) 55 Unit(s) SubCutaneous at bedtime  insulin lispro (ADMELOG) corrective regimen sliding scale   SubCutaneous three times a day before meals  insulin lispro (ADMELOG) corrective regimen sliding scale   SubCutaneous at bedtime  insulin lispro Injectable (ADMELOG) 10 Unit(s) SubCutaneous three times a day before meals  isosorbide   dinitrate Tablet (ISORDIL) 10 milliGRAM(s) Oral three times a day  metoprolol succinate  milliGRAM(s) Oral two times a day  multivitamin 1 Tablet(s) Oral daily  senna 2 Tablet(s) Oral at bedtime    MEDICATIONS  (PRN):  acetaminophen   Tablet .. 650 milliGRAM(s) Oral every 6 hours PRN Temp greater or equal to 38C (100.4F), Mild Pain (1 - 3), Moderate Pain (4 - 6)  ketotifen 0.025% Ophthalmic Solution 1 Drop(s) Both EYES two times a day PRN Allergic Conjunctivitis      CAPILLARY BLOOD GLUCOSE      POCT Blood Glucose.: 143 mg/dL (18 May 2021 12:04)  POCT Blood Glucose.: 111 mg/dL (18 May 2021 08:40)  POCT Blood Glucose.: 122 mg/dL (17 May 2021 21:28)  POCT Blood Glucose.: 140 mg/dL (17 May 2021 19:19)  POCT Blood Glucose.: 91 mg/dL (17 May 2021 17:00)    I&O's Summary    17 May 2021 07:01  -  18 May 2021 07:00  --------------------------------------------------------  IN: 500 mL / OUT: 2200 mL / NET: -1700 mL        PHYSICAL EXAM:  Vital Signs Last 24 Hrs  T(C): 37.1 (18 May 2021 11:17), Max: 37.2 (18 May 2021 06:32)  T(F): 98.7 (18 May 2021 11:17), Max: 99 (18 May 2021 06:32)  HR: 67 (18 May 2021 13:13) (58 - 67)  BP: 141/70 (18 May 2021 13:13) (111/59 - 144/78)  BP(mean): --  RR: 18 (18 May 2021 11:17) (18 - 18)  SpO2: 96% (18 May 2021 11:17) (96% - 100%)    CONSTITUTIONAL: NAD, well-developed, well-groomed  EYES: PERRLA; conjunctiva and sclera clear  RESPIRATORY: Normal respiratory effort; lungs are clear to auscultation bilaterally  CARDIOVASCULAR: Regular rate and rhythm, normal S1 and S2, no murmur/rub/gallop; Trace b/l LE edema   ABDOMEN: Nontender to palpation, normoactive bowel sounds, no rebound/guarding  MUSCLOSKELETAL:  Normal gait; no clubbing or cyanosis of digits; no joint swelling or tenderness to palpation  PSYCH: A+O to person, place, and time; affect appropriate  NEUROLOGY: CN 2-12 are intact and symmetric; no gross sensory deficits;   SKIN: No rashes; no palpable lesions    LABS:                        9.7    5.94  )-----------( 102      ( 18 May 2021 06:28 )             29.5     05-18    132<L>  |  93<L>  |  37<H>  ----------------------------<  138<H>  4.1   |  26  |  3.50<H>    Ca    9.7      18 May 2021 06:28  Phos  4.2     05-18  Mg     2.1     05-18

## 2021-05-18 NOTE — PROGRESS NOTE ADULT - ASSESSMENT
71 y/p female with PMHX of HTN, DM II, HLD, PAD s/p RLE angiogram, CKD comes in with complaints of worsening SOB, LE edema, abdominal distension.   CXR shows clear lungs, LE dopplers show no DVT, CT head unremarkable. Labs significant for BUN/Cr 46/2.43, K 5.2, proBNP 303. She states she was recently told she had a weak heart and has been placed on oral diuretics, but with minimal relief. She admits to 4 pillow orthopnea, BREEN w/ minimal exertion. No PND, CP, palpitations, dizziness. She has had a stress test with her cardiologist Dr. Alas a few months ago, but unclear of the results. Admits to drinking 1 pint of vodka most weekends. Non smoker, no other drug use.    5/4/21 RHC: RA 20; PA systolic 69, PAD 21, PA mean 44, PCWP 25-30 with respiratory variation, Hemoglobin 9.9, PA sat 70's, CO 8.4/ CI 4- volume overloaded    Improving volume status with output with HD/voiding 2200 with net negative 1700 ml

## 2021-05-18 NOTE — PROGRESS NOTE ADULT - PROBLEM SELECTOR PLAN 8
DVT ppx - Heparin 5,000u sc tid    Dispo- pending further improvement of renal function volume status, now on HD.  Pt may require permacath   PT eval: outpt PT DVT ppx - Heparin 5,000u sc tid    Dispo- pending further improvement of renal function volume status, now on HD.  Pt may require permacath   PT eval: outpt PT  Care discussed with daughter Tootie

## 2021-05-18 NOTE — PROGRESS NOTE ADULT - PROBLEM SELECTOR PLAN 1
- Acute on chronic HFpEF, initiated on HD   - s/p RHC 5/4 c/w severe pulm HTN  - TTE normal LVEF  - S/p metolazone x2  - continue hydralazine 25mg tid, isordil 10 tid, Toprol 100 mg po BID.   - 1.5L fluid restriction, daily weights, I/O   - Cont HD for volume optimization.   - Appreciate HF recs.  - Remains on IV Bumex, will f/u HF and renal recommendations

## 2021-05-19 LAB
ANION GAP SERPL CALC-SCNC: 12 MMOL/L — SIGNIFICANT CHANGE UP (ref 7–14)
BASOPHILS # BLD AUTO: 0.03 K/UL — SIGNIFICANT CHANGE UP (ref 0–0.2)
BASOPHILS NFR BLD AUTO: 0.5 % — SIGNIFICANT CHANGE UP (ref 0–2)
BUN SERPL-MCNC: 54 MG/DL — HIGH (ref 7–23)
CALCIUM SERPL-MCNC: 10.1 MG/DL — SIGNIFICANT CHANGE UP (ref 8.4–10.5)
CHLORIDE SERPL-SCNC: 91 MMOL/L — LOW (ref 98–107)
CO2 SERPL-SCNC: 27 MMOL/L — SIGNIFICANT CHANGE UP (ref 22–31)
CREAT SERPL-MCNC: 4.02 MG/DL — HIGH (ref 0.5–1.3)
EOSINOPHIL # BLD AUTO: 0.25 K/UL — SIGNIFICANT CHANGE UP (ref 0–0.5)
EOSINOPHIL NFR BLD AUTO: 4 % — SIGNIFICANT CHANGE UP (ref 0–6)
GLUCOSE SERPL-MCNC: 116 MG/DL — HIGH (ref 70–99)
HCT VFR BLD CALC: 28.6 % — LOW (ref 34.5–45)
HGB BLD-MCNC: 9.5 G/DL — LOW (ref 11.5–15.5)
IANC: 3.13 K/UL — SIGNIFICANT CHANGE UP (ref 1.5–8.5)
IMM GRANULOCYTES NFR BLD AUTO: 0.3 % — SIGNIFICANT CHANGE UP (ref 0–1.5)
LYMPHOCYTES # BLD AUTO: 2.11 K/UL — SIGNIFICANT CHANGE UP (ref 1–3.3)
LYMPHOCYTES # BLD AUTO: 33.6 % — SIGNIFICANT CHANGE UP (ref 13–44)
MAGNESIUM SERPL-MCNC: 2.2 MG/DL — SIGNIFICANT CHANGE UP (ref 1.6–2.6)
MCHC RBC-ENTMCNC: 28.1 PG — SIGNIFICANT CHANGE UP (ref 27–34)
MCHC RBC-ENTMCNC: 33.2 GM/DL — SIGNIFICANT CHANGE UP (ref 32–36)
MCV RBC AUTO: 84.6 FL — SIGNIFICANT CHANGE UP (ref 80–100)
MONOCYTES # BLD AUTO: 0.74 K/UL — SIGNIFICANT CHANGE UP (ref 0–0.9)
MONOCYTES NFR BLD AUTO: 11.8 % — SIGNIFICANT CHANGE UP (ref 2–14)
NEUTROPHILS # BLD AUTO: 3.13 K/UL — SIGNIFICANT CHANGE UP (ref 1.8–7.4)
NEUTROPHILS NFR BLD AUTO: 49.8 % — SIGNIFICANT CHANGE UP (ref 43–77)
NRBC # BLD: 0 /100 WBCS — SIGNIFICANT CHANGE UP
NRBC # FLD: 0 K/UL — SIGNIFICANT CHANGE UP
PHOSPHATE SERPL-MCNC: 4.7 MG/DL — HIGH (ref 2.5–4.5)
PLATELET # BLD AUTO: 134 K/UL — LOW (ref 150–400)
POTASSIUM SERPL-MCNC: 4.4 MMOL/L — SIGNIFICANT CHANGE UP (ref 3.5–5.3)
POTASSIUM SERPL-SCNC: 4.4 MMOL/L — SIGNIFICANT CHANGE UP (ref 3.5–5.3)
RBC # BLD: 3.38 M/UL — LOW (ref 3.8–5.2)
RBC # FLD: 13.4 % — SIGNIFICANT CHANGE UP (ref 10.3–14.5)
SODIUM SERPL-SCNC: 130 MMOL/L — LOW (ref 135–145)
WBC # BLD: 6.28 K/UL — SIGNIFICANT CHANGE UP (ref 3.8–10.5)
WBC # FLD AUTO: 6.28 K/UL — SIGNIFICANT CHANGE UP (ref 3.8–10.5)

## 2021-05-19 PROCEDURE — 99221 1ST HOSP IP/OBS SF/LOW 40: CPT

## 2021-05-19 PROCEDURE — 99232 SBSQ HOSP IP/OBS MODERATE 35: CPT

## 2021-05-19 RX ORDER — INSULIN GLARGINE 100 [IU]/ML
40 INJECTION, SOLUTION SUBCUTANEOUS AT BEDTIME
Refills: 0 | Status: DISCONTINUED | OUTPATIENT
Start: 2021-05-19 | End: 2021-05-22

## 2021-05-19 RX ADMIN — ATORVASTATIN CALCIUM 80 MILLIGRAM(S): 80 TABLET, FILM COATED ORAL at 22:49

## 2021-05-19 RX ADMIN — Medication 81 MILLIGRAM(S): at 08:51

## 2021-05-19 RX ADMIN — Medication 25 MILLIGRAM(S): at 22:49

## 2021-05-19 RX ADMIN — INSULIN GLARGINE 40 UNIT(S): 100 INJECTION, SOLUTION SUBCUTANEOUS at 22:51

## 2021-05-19 RX ADMIN — GABAPENTIN 300 MILLIGRAM(S): 400 CAPSULE ORAL at 22:49

## 2021-05-19 RX ADMIN — Medication 2: at 17:33

## 2021-05-19 RX ADMIN — Medication 100 MILLIGRAM(S): at 08:51

## 2021-05-19 RX ADMIN — ISOSORBIDE DINITRATE 10 MILLIGRAM(S): 5 TABLET ORAL at 22:50

## 2021-05-19 RX ADMIN — CHLORHEXIDINE GLUCONATE 1 APPLICATION(S): 213 SOLUTION TOPICAL at 08:51

## 2021-05-19 RX ADMIN — Medication 10 UNIT(S): at 17:32

## 2021-05-19 RX ADMIN — BUMETANIDE 2 MILLIGRAM(S): 0.25 INJECTION INTRAMUSCULAR; INTRAVENOUS at 18:06

## 2021-05-19 RX ADMIN — Medication 10 UNIT(S): at 08:50

## 2021-05-19 RX ADMIN — HEPARIN SODIUM 5000 UNIT(S): 5000 INJECTION INTRAVENOUS; SUBCUTANEOUS at 12:53

## 2021-05-19 RX ADMIN — GABAPENTIN 300 MILLIGRAM(S): 400 CAPSULE ORAL at 06:41

## 2021-05-19 RX ADMIN — GABAPENTIN 300 MILLIGRAM(S): 400 CAPSULE ORAL at 12:52

## 2021-05-19 RX ADMIN — BUMETANIDE 2 MILLIGRAM(S): 0.25 INJECTION INTRAMUSCULAR; INTRAVENOUS at 06:44

## 2021-05-19 RX ADMIN — Medication 100 MILLIGRAM(S): at 06:41

## 2021-05-19 RX ADMIN — HEPARIN SODIUM 5000 UNIT(S): 5000 INJECTION INTRAVENOUS; SUBCUTANEOUS at 06:41

## 2021-05-19 RX ADMIN — HEPARIN SODIUM 5000 UNIT(S): 5000 INJECTION INTRAVENOUS; SUBCUTANEOUS at 22:49

## 2021-05-19 RX ADMIN — Medication 1 TABLET(S): at 08:50

## 2021-05-19 RX ADMIN — Medication 10 UNIT(S): at 12:51

## 2021-05-19 NOTE — PROGRESS NOTE ADULT - PROBLEM SELECTOR PLAN 2
- Initiated on HD for volume overload   - S/p Cedric on 5/13.  - Diuresis as above. HD as per nephro  - Pt to have permacath placed tomorrow with IR

## 2021-05-19 NOTE — PROGRESS NOTE ADULT - ASSESSMENT
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 Endocrine consulted for uncontrolled DM2 with hyperglycemia inpatient.    1. Type 2 diabetes mellitus with hyperglycemia, with long-term current use of insulin  T2DM with hgba1c of 6.8% with FS tightly controlled at home but on unconventional regimen of BID NPH and regular insulin, states it may have been due to cost    Inpatient BG target 100-180 mg/dl  Within target today, no hypoglycemia   Continue Lantus 55 units qhs   Continue Admelog 10 units SQ TID before meals (Hold if NPO/not eating meal)   Continue Admelog moderate dose correction scale TID before meals and moderate bedtime scale  Check BG  before meals and bedtime  Consistent carbohydrate diet    Discharge Plan:   Basal and bolus insulin pens assessed for cost and the cost is too high for the patient.  Will therefore recommend for discharge - Novolin 70/30 vial (from [email or web address]) with syringes, final doses to be determined before discharge.   As of now tentative dosing would be 60 units before breakfast and 30 units before dinner  Patient was previously following with endocrinologist Dr. Sahu and will resume follow up after discharge.    2. Thyroid nodule  RLP hypoechoic nodule 13mm and LLP isoechoic nodule 1.9 cm. Both benign according to pt in the past but would need records. Can follow up outpatient.    3. Essential hypertension  BP goal <130/80  Management per primary team    4. Other hyperlipidemia  LDL 72 on Lipitor     Jo Harrison  Nurse Practitioner  Division of Endocrinology & Diabetes  In house pager #[phone]/long range pager #[phone]    If before 9AM or after 6PM, or on weekends/holidays, please call endocrine answering service for assistance ([phone]).  For nonurgent matters email [email or web address] for [email or web address].

## 2021-05-19 NOTE — PROGRESS NOTE ADULT - SUBJECTIVE AND OBJECTIVE BOX
Chief Complaint: DM 2    History: Patient seen at bedside. Reports she is eating meals, denies n/v, denies s/s of hypoglycemia     MEDICATIONS  (STANDING):  allopurinol 100 milliGRAM(s) Oral daily  aspirin enteric coated 81 milliGRAM(s) Oral daily  atorvastatin 80 milliGRAM(s) Oral at bedtime  buMETAnide Injectable 2 milliGRAM(s) IV Push two times a day  chlorhexidine 2% Cloths 1 Application(s) Topical daily  dextrose 40% Gel 15 Gram(s) Oral once  dextrose 5%. 1000 milliLiter(s) (50 mL/Hr) IV Continuous <Continuous>  dextrose 5%. 1000 milliLiter(s) (100 mL/Hr) IV Continuous <Continuous>  dextrose 50% Injectable 25 Gram(s) IV Push once  dextrose 50% Injectable 12.5 Gram(s) IV Push once  dextrose 50% Injectable 25 Gram(s) IV Push once  gabapentin 300 milliGRAM(s) Oral three times a day  glucagon  Injectable 1 milliGRAM(s) IntraMuscular once  heparin   Injectable 5000 Unit(s) SubCutaneous every 8 hours  hydrALAZINE 25 milliGRAM(s) Oral three times a day  insulin glargine Injectable (LANTUS) 55 Unit(s) SubCutaneous at bedtime  insulin lispro (ADMELOG) corrective regimen sliding scale   SubCutaneous three times a day before meals  insulin lispro (ADMELOG) corrective regimen sliding scale   SubCutaneous at bedtime  insulin lispro Injectable (ADMELOG) 10 Unit(s) SubCutaneous three times a day before meals  isosorbide   dinitrate Tablet (ISORDIL) 10 milliGRAM(s) Oral three times a day  metoprolol succinate  milliGRAM(s) Oral two times a day  multivitamin 1 Tablet(s) Oral daily  senna 2 Tablet(s) Oral at bedtime    MEDICATIONS  (PRN):  acetaminophen   Tablet .. 650 milliGRAM(s) Oral every 6 hours PRN Temp greater or equal to 38C (100.4F), Mild Pain (1 - 3), Moderate Pain (4 - 6)  ketotifen 0.025% Ophthalmic Solution 1 Drop(s) Both EYES two times a day PRN Allergic Conjunctivitis    No Known Allergies    Review of Systems:  Cardiovascular: No chest pain  Respiratory: No SOB  GI: No nausea, vomiting  Endocrine: no hypoglycemia     PHYSICAL EXAM:  VITALS: T(C): 37 (05-19-21 @ 05:30)  T(F): 98.6 (05-19-21 @ 05:30), Max: 98.6 (05-18-21 @ 21:30)  HR: 65 (05-19-21 @ 05:30) (65 - 66)  BP: 143/65 (05-19-21 @ 05:30) (113/60 - 150/81)  RR:  (18 - 18)  SpO2:  (98% - 100%)  Wt(kg): --  GENERAL: NAD  EYES: No proptosis, no lid lag, anicteric  HEENT:  Atraumatic, Normocephalic, moist mucous membranes  RESPIRATORY: unlabored respirations     CAPILLARY BLOOD GLUCOSE    POCT Blood Glucose.: 149 mg/dL (19 May 2021 12:22)  POCT Blood Glucose.: 130 mg/dL (19 May 2021 08:34)  POCT Blood Glucose.: 127 mg/dL (18 May 2021 21:19)  POCT Blood Glucose.: 174 mg/dL (18 May 2021 17:40)      05-19    130<L>  |  91<L>  |  54<H>  ----------------------------<  116<H>  4.4   |  27  |  4.02<H>    EGFR if : 12<L>  EGFR if non : 10<L>    Ca    10.1      05-19  Mg     2.2     05-19  Phos  4.7     05-19        Thyroid Function Tests:  04-30 @ 07:41 TSH 2.19 FreeT4 -- T3 -- Anti TPO -- Anti Thyroglobulin Ab -- TSI --      A1C with Estimated Average Glucose Result: 6.8 % (04-30-21 @ 07:41)    Diet, Regular:   Consistent Carbohydrate No Snacks (CSTCHO)  DASH/TLC Sodium & Cholesterol Restricted (DASH)  1500mL Fluid Restriction ([id number])  For patients receiving Renal Replacement - No Protein Restr, No Conc K, No Conc Phos, Low Sodium (RENAL)  No Dairy (05-14-21 @ 16:52)

## 2021-05-19 NOTE — CHART NOTE - NSCHARTNOTEFT_GEN_A_CORE
Patient Age: 71  Patient Gender: female   Procedure (including site / side if known): permacath   Diagnosis / Indication: new hd   Interventional Radiology Attending Physician: dr douglas   Ordering Attending Physician: dr sanchez  Pertinent medical history: htn, hld, dm, ckd, pad, chf   Pertinent labs: ordered for am   Patient and Family aware? Yes Patient Age: 71  Patient Gender: female   Procedure (including site / side if known): permacath   Diagnosis / Indication: new hd   Interventional Radiology Attending Physician: dr douglas   Ordering Attending Physician: dr sanchez  Pertinent medical history: htn, hld, dm, ckd, pad, chf   Pertinent labs: ordered for am   Patient  aware? Yes

## 2021-05-19 NOTE — PROGRESS NOTE ADULT - PROBLEM SELECTOR PLAN 8
DVT ppx - Heparin 5,000u sc tid    Dispo- awaiting outpt HD facility setup    PT eval: outpt PT  Care discussed with daughter Tootie

## 2021-05-19 NOTE — PROGRESS NOTE ADULT - SUBJECTIVE AND OBJECTIVE BOX
[facility] Division of Hospital Medicine  Areli Celeste DO  Pager (URIEL-JEYSON, 5R-0T): [phone]    Patient is a 71y old  Female who presents with a chief complaint of Worsening SOB (19 May 2021 13:57)    SUBJECTIVE / OVERNIGHT EVENTS: Pt feels well, has been tolerating HD well. Pt in agreement with permacath placement scheduled for tomorrow. Denies Cp/SOB, has been ambulating with no difficulty.       MEDICATIONS  (STANDING):  allopurinol 100 milliGRAM(s) Oral daily  aspirin enteric coated 81 milliGRAM(s) Oral daily  atorvastatin 80 milliGRAM(s) Oral at bedtime  buMETAnide Injectable 2 milliGRAM(s) IV Push two times a day  chlorhexidine 2% Cloths 1 Application(s) Topical daily  dextrose 40% Gel 15 Gram(s) Oral once  dextrose 5%. 1000 milliLiter(s) (50 mL/Hr) IV Continuous <Continuous>  dextrose 5%. 1000 milliLiter(s) (100 mL/Hr) IV Continuous <Continuous>  dextrose 50% Injectable 25 Gram(s) IV Push once  dextrose 50% Injectable 12.5 Gram(s) IV Push once  dextrose 50% Injectable 25 Gram(s) IV Push once  gabapentin 300 milliGRAM(s) Oral three times a day  glucagon  Injectable 1 milliGRAM(s) IntraMuscular once  heparin   Injectable 5000 Unit(s) SubCutaneous every 8 hours  hydrALAZINE 25 milliGRAM(s) Oral three times a day  insulin glargine Injectable (LANTUS) 55 Unit(s) SubCutaneous at bedtime  insulin lispro (ADMELOG) corrective regimen sliding scale   SubCutaneous three times a day before meals  insulin lispro (ADMELOG) corrective regimen sliding scale   SubCutaneous at bedtime  insulin lispro Injectable (ADMELOG) 10 Unit(s) SubCutaneous three times a day before meals  isosorbide   dinitrate Tablet (ISORDIL) 10 milliGRAM(s) Oral three times a day  metoprolol succinate  milliGRAM(s) Oral two times a day  multivitamin 1 Tablet(s) Oral daily  senna 2 Tablet(s) Oral at bedtime    MEDICATIONS  (PRN):  acetaminophen   Tablet .. 650 milliGRAM(s) Oral every 6 hours PRN Temp greater or equal to 38C (100.4F), Mild Pain (1 - 3), Moderate Pain (4 - 6)  ketotifen 0.025% Ophthalmic Solution 1 Drop(s) Both EYES two times a day PRN Allergic Conjunctivitis      CAPILLARY BLOOD GLUCOSE      POCT Blood Glucose.: 149 mg/dL (19 May 2021 12:22)  POCT Blood Glucose.: 130 mg/dL (19 May 2021 08:34)  POCT Blood Glucose.: 127 mg/dL (18 May 2021 21:19)  POCT Blood Glucose.: 174 mg/dL (18 May 2021 17:40)    I&O's Summary      PHYSICAL EXAM:  Vital Signs Last 24 Hrs  T(C): 37.1 (19 May 2021 13:13), Max: 37.1 (19 May 2021 13:13)  T(F): 98.8 (19 May 2021 13:13), Max: 98.8 (19 May 2021 13:13)  HR: 60 (19 May 2021 13:13) (60 - 66)  BP: 134/64 (19 May 2021 13:13) (113/60 - 150/81)  BP(mean): --  RR: 17 (19 May 2021 13:13) (17 - 18)  SpO2: 100% (19 May 2021 13:13) (98% - 100%)    CONSTITUTIONAL: NAD, well-developed, well-groomed  EYES: PERRLA; conjunctiva and sclera clear  RESPIRATORY: Normal respiratory effort; lungs are clear to auscultation bilaterally  CARDIOVASCULAR: Regular rate and rhythm, normal S1 and S2, no murmur/rub/gallop; Trace b/l LE edema   ABDOMEN: Nontender to palpation, normoactive bowel sounds, no rebound/guarding  MUSCULOSKELETAL:  No clubbing or cyanosis of digits; no joint swelling or tenderness to palpation  PSYCH: A+O to person, place, and time; affect appropriate  NEUROLOGY: CN 2-12 are intact and symmetric; no gross sensory deficits;   SKIN: R Cedric C/D/I     LABS:                        9.5    6.28  )-----------( 134      ( 19 May 2021 08:21 )             28.6     05-19    130<L>  |  91<L>  |  54<H>  ----------------------------<  116<H>  4.4   |  27  |  4.02<H>    Ca    10.1      19 May 2021 08:21  Phos  4.7     05-19  Mg     2.2     05-19      COORDINATION OF CARE:  Care Discussed with Consultants/Other Providers [Y/N]: Renal Dr. Thompson

## 2021-05-19 NOTE — PROGRESS NOTE ADULT - PROBLEM SELECTOR PLAN 1
Pt with CKD3, follows with Dr Escudero in outpt clinic.   Baseline Cr ~2 from earlier this year  Worsening renal failure, requiring HD start.   No  obstruction on renal US. No significant proteinuria on UA. BP stable, no obvious nephrotoxin.   s/p shiley cath placement.   Plan for next HD thurs 5/20, UF goal 1.5kg, as pt tolerates   BUN/Cr remains elevated, would recommend changing shiley to tunneled cath, and plan for outpt HD unit placement.   monitor daily BMP, strict I/O.   dose all meds for eGFR<15ml/min.   avoid ACEi/ARB/NSAIDs/Nephrotoxics. Pt with CKD3, follows with Dr Escudero in outpt clinic.   Baseline Cr ~2 from earlier this year  Worsening renal failure, requiring HD start.   No  obstruction on renal US. No significant proteinuria on UA. BP stable, no obvious nephrotoxin.   s/p shiley cath placement.   Plan for isolated UF today for fluid optimization.   Then repeat HD thurs 5/20, UF goal 1.5kg, as pt tolerates   BUN/Cr remains elevated, would recommend changing shiley to tunneled cath, and plan for outpt HD unit placement.   monitor daily BMP, strict I/O.   dose all meds for eGFR<15ml/min.   avoid ACEi/ARB/NSAIDs/Nephrotoxics.

## 2021-05-19 NOTE — PROGRESS NOTE ADULT - PROBLEM SELECTOR PLAN 1
-Patient feels well.   - Hydralazine was decreased to 25 mg po tid on 5/17 and may hold hydralazine dose on the AM of HD to facilitate more fluid removal.  - Continue Bumex 2 IV BID, will d/w Dr. Hu regarding switching to PO.   - continue isordil 10 tid  -Continue Toprol 100 mg po BID.   -Keep k 4.0-5.0 and mag 2.0.   -Daily standing weights and strict I/O.   -Await final recs from renal regarding long term HD.

## 2021-05-19 NOTE — PROGRESS NOTE ADULT - PROBLEM SELECTOR PLAN 1
- Acute on chronic HFpEF, initiated on HD   - s/p RHC 5/4 c/w severe pulm HTN  - TTE normal LVEF  - S/p metolazone x2  - continue hydralazine 25mg tid, isordil 10 tid, Toprol 100 mg po BID. (BP meds being held on HD days to optimize fluid removal)   - 1.5L fluid restriction, daily weights, I/O   - Cont HD for volume optimization.   - Appreciate HF recs.  - Remains on IV Bumex, will f/u HF and renal recommendations

## 2021-05-19 NOTE — PROGRESS NOTE ADULT - SUBJECTIVE AND OBJECTIVE BOX
Medications:  acetaminophen   Tablet .. 650 milliGRAM(s) Oral every 6 hours PRN  allopurinol 100 milliGRAM(s) Oral daily  aspirin enteric coated 81 milliGRAM(s) Oral daily  atorvastatin 80 milliGRAM(s) Oral at bedtime  buMETAnide Injectable 2 milliGRAM(s) IV Push two times a day  chlorhexidine 2% Cloths 1 Application(s) Topical daily  dextrose 40% Gel 15 Gram(s) Oral once  dextrose 5%. 1000 milliLiter(s) IV Continuous <Continuous>  dextrose 5%. 1000 milliLiter(s) IV Continuous <Continuous>  dextrose 50% Injectable 25 Gram(s) IV Push once  dextrose 50% Injectable 12.5 Gram(s) IV Push once  dextrose 50% Injectable 25 Gram(s) IV Push once  gabapentin 300 milliGRAM(s) Oral three times a day  glucagon  Injectable 1 milliGRAM(s) IntraMuscular once  heparin   Injectable 5000 Unit(s) SubCutaneous every 8 hours  hydrALAZINE 25 milliGRAM(s) Oral three times a day  insulin glargine Injectable (LANTUS) 55 Unit(s) SubCutaneous at bedtime  insulin lispro (ADMELOG) corrective regimen sliding scale   SubCutaneous three times a day before meals  insulin lispro (ADMELOG) corrective regimen sliding scale   SubCutaneous at bedtime  insulin lispro Injectable (ADMELOG) 10 Unit(s) SubCutaneous three times a day before meals  isosorbide   dinitrate Tablet (ISORDIL) 10 milliGRAM(s) Oral three times a day  ketotifen 0.025% Ophthalmic Solution 1 Drop(s) Both EYES two times a day PRN  metoprolol succinate  milliGRAM(s) Oral two times a day  multivitamin 1 Tablet(s) Oral daily  senna 2 Tablet(s) Oral at bedtime      Vitals:  Vital Signs Last 24 Hrs  T(C): 37 (19 May 2021 05:30), Max: 37.1 (18 May 2021 11:17)  T(F): 98.6 (19 May 2021 05:30), Max: 98.7 (18 May 2021 11:17)  HR: 65 (19 May 2021 05:30) (60 - 67)  BP: 143/65 (19 May 2021 05:30) (113/60 - 150/81)  BP(mean): --  RR: 18 (19 May 2021 05:30) (18 - 18)  SpO2: 100% (19 May 2021 05:30) (96% - 100%)    Daily     Daily     I&O's Detail      Physical Exam:     General: No distress. Comfortable.  HEENT: EOM intact.  Neck: Neck supple. JVP not elevated. No masses  Chest: Clear to auscultation bilaterally  CV: Normal S1 and S2. No murmurs, rub, or gallops. Radial pulses normal.  Abdomen: Soft, non-distended, non-tender  Skin: No rashes or skin breakdown  Neurology: Alert and oriented times three. Sensation intact  Psych: Affect normal    Labs:                        9.5    6.28  )-----------( 134      ( 19 May 2021 08:21 )             28.6     05-19    130<L>  |  91<L>  |  54<H>  ----------------------------<  116<H>  4.4   |  27  |  4.02<H>    Ca    10.1      19 May 2021 08:21  Phos  4.7     05-19  Mg     2.2     05-19             Patient seen and examined. She states her breathing feels comfortable, no SOB at rest or BREEN while walking in room.   No CP, palpitations, dizziness.   Planned for HD today.       Medications:  acetaminophen   Tablet .. 650 milliGRAM(s) Oral every 6 hours PRN  allopurinol 100 milliGRAM(s) Oral daily  aspirin enteric coated 81 milliGRAM(s) Oral daily  atorvastatin 80 milliGRAM(s) Oral at bedtime  buMETAnide Injectable 2 milliGRAM(s) IV Push two times a day  chlorhexidine 2% Cloths 1 Application(s) Topical daily  dextrose 40% Gel 15 Gram(s) Oral once  dextrose 5%. 1000 milliLiter(s) IV Continuous <Continuous>  dextrose 5%. 1000 milliLiter(s) IV Continuous <Continuous>  dextrose 50% Injectable 25 Gram(s) IV Push once  dextrose 50% Injectable 12.5 Gram(s) IV Push once  dextrose 50% Injectable 25 Gram(s) IV Push once  gabapentin 300 milliGRAM(s) Oral three times a day  glucagon  Injectable 1 milliGRAM(s) IntraMuscular once  heparin   Injectable 5000 Unit(s) SubCutaneous every 8 hours  hydrALAZINE 25 milliGRAM(s) Oral three times a day  insulin glargine Injectable (LANTUS) 55 Unit(s) SubCutaneous at bedtime  insulin lispro (ADMELOG) corrective regimen sliding scale   SubCutaneous three times a day before meals  insulin lispro (ADMELOG) corrective regimen sliding scale   SubCutaneous at bedtime  insulin lispro Injectable (ADMELOG) 10 Unit(s) SubCutaneous three times a day before meals  isosorbide   dinitrate Tablet (ISORDIL) 10 milliGRAM(s) Oral three times a day  ketotifen 0.025% Ophthalmic Solution 1 Drop(s) Both EYES two times a day PRN  metoprolol succinate  milliGRAM(s) Oral two times a day  multivitamin 1 Tablet(s) Oral daily  senna 2 Tablet(s) Oral at bedtime      Vitals:  Vital Signs Last 24 Hrs  T(C): 37 (19 May 2021 05:30), Max: 37.1 (18 May 2021 11:17)  T(F): 98.6 (19 May 2021 05:30), Max: 98.7 (18 May 2021 11:17)  HR: 65 (19 May 2021 05:30) (60 - 67)  BP: 143/65 (19 May 2021 05:30) (113/60 - 150/81)  BP(mean): --  RR: 18 (19 May 2021 05:30) (18 - 18)  SpO2: 100% (19 May 2021 05:30) (96% - 100%)    Daily     Daily     I&O's Detail      Physical Exam:     General: No distress. Comfortable.  HEENT: EOM intact.  Neck: Neck supple. JVP not elevated. No masses  Chest: Clear to auscultation bilaterally  CV: Normal S1 and S2. No murmurs, rub, or gallops. Radial pulses normal. Trace tight b/l LE edema. Warm b/l.   Abdomen: Soft, non-distended, non-tender  Skin: No rashes or skin breakdown  Neurology: Alert and oriented times three. Sensation intact  Psych: Affect normal    Labs:                        9.5    6.28  )-----------( 134      ( 19 May 2021 08:21 )             28.6     05-19    130<L>  |  91<L>  |  54<H>  ----------------------------<  116<H>  4.4   |  27  |  4.02<H>    Ca    10.1      19 May 2021 08:21  Phos  4.7     05-19  Mg     2.2     05-19

## 2021-05-19 NOTE — PROGRESS NOTE ADULT - SUBJECTIVE AND OBJECTIVE BOX
Nephrology Followup Note - [phone] - Dr Thompson / Dr Murdock / Dr Morocho / Dr Kendrick / Dr Magallanes / Dr Alfaro / Dr Escudero / Dr Raymundo  Pt seen and examined at bedside  No acute events overnight. No complaints.     Allergies:  No Known Allergies    Hospital Medications:   MEDICATIONS  (STANDING):  allopurinol 100 milliGRAM(s) Oral daily  aspirin enteric coated 81 milliGRAM(s) Oral daily  atorvastatin 80 milliGRAM(s) Oral at bedtime  buMETAnide Injectable 2 milliGRAM(s) IV Push two times a day  chlorhexidine 2% Cloths 1 Application(s) Topical daily  dextrose 40% Gel 15 Gram(s) Oral once  dextrose 5%. 1000 milliLiter(s) (50 mL/Hr) IV Continuous <Continuous>  dextrose 5%. 1000 milliLiter(s) (100 mL/Hr) IV Continuous <Continuous>  dextrose 50% Injectable 25 Gram(s) IV Push once  dextrose 50% Injectable 12.5 Gram(s) IV Push once  dextrose 50% Injectable 25 Gram(s) IV Push once  gabapentin 300 milliGRAM(s) Oral three times a day  glucagon  Injectable 1 milliGRAM(s) IntraMuscular once  heparin   Injectable 5000 Unit(s) SubCutaneous every 8 hours  hydrALAZINE 25 milliGRAM(s) Oral three times a day  insulin glargine Injectable (LANTUS) 55 Unit(s) SubCutaneous at bedtime  insulin lispro (ADMELOG) corrective regimen sliding scale   SubCutaneous three times a day before meals  insulin lispro (ADMELOG) corrective regimen sliding scale   SubCutaneous at bedtime  insulin lispro Injectable (ADMELOG) 10 Unit(s) SubCutaneous three times a day before meals  isosorbide   dinitrate Tablet (ISORDIL) 10 milliGRAM(s) Oral three times a day  metoprolol succinate  milliGRAM(s) Oral two times a day  multivitamin 1 Tablet(s) Oral daily  senna 2 Tablet(s) Oral at bedtime    VITALS:  T(F): 98.6 (05-19-21 @ 05:30), Max: 98.6 (05-18-21 @ 21:30)  HR: 65 (05-19-21 @ 05:30)  BP: 143/65 (05-19-21 @ 05:30)  RR: 18 (05-19-21 @ 05:30)  SpO2: 100% (05-19-21 @ 05:30)  Wt(kg): --      PHYSICAL EXAM:  Constitutional: NAD  HEENT: anicteric sclera, oropharynx clear, MMM  Neck: No JVD  Respiratory: CTAB, no wheezes, rales or rhonchi  Cardiovascular: S1, S2, RRR  Gastrointestinal: BS+, soft, NT/ND  Extremities: No cyanosis or clubbing. No peripheral edema  Neurological: A/O x 3, no focal deficits  Psychiatric: Normal mood, normal affect  : No CVA tenderness. No paredes.   Skin: No rashes  [facility]     LABS:  05-19    130<L>  |  91<L>  |  54<H>  ----------------------------<  116<H>  4.4   |  27  |  4.02<H>    Ca    10.1      19 May 2021 08:21  Phos  4.7     05-19  Mg     2.2     05-19      Creatinine Trend: 4.02 <--, 3.50 <--, 4.72 <--, 3.75 <--, 3.84 <--, 4.39 <--, 4.63 <--                        9.5    6.28  )-----------( 134      ( 19 May 2021 08:21 )             28.6     Urine Studies:      RADIOLOGY & ADDITIONAL STUDIES:

## 2021-05-19 NOTE — PROGRESS NOTE ADULT - ASSESSMENT
71 y/p female with PMHX of HTN, DM II, HLD, PAD s/p RLE angiogram, CKD comes in with complaints of worsening SOB, LE edema, abdominal distension.   CXR shows clear lungs, LE dopplers show no DVT, CT head unremarkable. Labs significant for BUN/Cr 46/2.43, K 5.2, proBNP 303. She states she was recently told she had a weak heart and has been placed on oral diuretics, but with minimal relief. She admits to 4 pillow orthopnea, BREEN w/ minimal exertion. No PND, CP, palpitations, dizziness. She has had a stress test with her cardiologist Dr. Alas a few months ago, but unclear of the results. Admits to drinking 1 pint of vodka most weekends. Non smoker, no other drug use.    5/4/21 RHC: RA 20; PA systolic 69, PAD 21, PA mean 44, PCWP 25-30 with respiratory variation, Hemoglobin 9.9, PA sat 70's, CO 8.4/ CI 4- volume overloaded    Improving volume status. Awaiting decision regarding long term HD.

## 2021-05-19 NOTE — CONSULT NOTE ADULT - SUBJECTIVE AND OBJECTIVE BOX
Interventional Radiology    71 F RADHA on CKD3,  CHF exacerbation, requiring HD, IR placed RIJ nontunneled HD 5/13, patient now requiring continuing HD, IR consulted for conversion to tunneled HD.     Allergies:   Medications (Abx/Cardiac/Anticoagulation/Blood Products)    aspirin enteric coated: 81 milliGRAM(s) Oral (05-19 @ 08:51)  buMETAnide Injectable: 2 milliGRAM(s) IV Push (05-19 @ 06:44)  heparin   Injectable: 5000 Unit(s) SubCutaneous (05-19 @ 06:41)  hydrALAZINE: 50 milliGRAM(s) Oral (05-17 @ 13:29)  hydrALAZINE: 25 milliGRAM(s) Oral (05-18 @ 21:34)  isosorbide   dinitrate Tablet (ISORDIL): 10 milliGRAM(s) Oral (05-18 @ 21:35)  metoprolol succinate ER: 100 milliGRAM(s) Oral (05-19 @ 06:41)    Data:    T(C): 37  HR: 65  BP: 143/65  RR: 18  SpO2: 100%    -WBC 6.28 / HgB 9.5 / Hct 28.6 / Plt 134  -Na 130 / Cl 91 / BUN 54 / Glucose 116  -K 4.4 / CO2 27 / Cr 4.02  -ALT -- / Alk Phos -- / T.Bili --  -INR 1.04 / PTT 31.9    Radiology:     Assessment/Plan:   71 F RADHA on CKD3,  CHF exacerbation, requiring HD, IR placed RIJ nontunneled HD 5/13, patient now requiring continuing HD, IR consulted for conversion to tunneled HD.     per team, patient on ASA but otherwise optimized for discharge, can place tunneled HD with ASA tomorrow rather than hold 5 days.    plan for  above procedure tomorrow 5/20 . can put order and preprocedure note for IR procedure under Dr. Cristobal  NPO AMN for sedation  please recheck CBC BMP Coags 4AM    Interventional Radiology    71 F RADHA on CKD3,  CHF exacerbation, requiring HD, IR placed RIJ nontunneled HD , patient now requiring continuing HD, IR consulted for conversion to tunneled HD.           PAST MEDICAL & SURGICAL HISTORY:  CKD (chronic kidney disease)    Diabetes    HLD (hyperlipidemia)    HTN (hypertension)    PVD (peripheral vascular disease)    CHF (congestive heart failure)    Peripheral neuropathy    History of angioplasty of peripheral vessel  RLE - no stents    History of hysterectomy    History of breast biopsy  bilateral - benign    S/P thyroid biopsy  benign    History of cataract surgery  bilateral eyes        FAMILY HISTORY:  Lung cancer  mother    Family history of chronic renal failure (Sibling)  sister  from renal failure @ age 35    Family history of myocardial infarction (Sibling)  sister        Social History: No history of tobacco,or illicit drug use. Etoh abuse for many years, 1 pint of vodka on weekends.     Outpatient Medications: Home Medications:  allopurinol 100 mg oral tablet: 1 tab(s) orally once a day (2021 22:27)  amLODIPine 5 mg oral tablet: 1 tab(s) orally once a day (2021 22:27)  Aspirin Enteric Coated 81 mg oral delayed release tablet: 1 tab(s) orally once a day (2021 22:27)  atorvastatin 80 mg oral tablet: 1 tab(s) orally once a day (2021 22:27)  Coreg 6.25 mg oral tablet: 1 tab(s) orally 2 times a day (2021 22:27)  gabapentin 300 mg oral tablet: 1 tab(s) orally 3 times a day (2021 22:27)  hydrALAZINE 100 mg oral tablet: 1 tab(s) orally 3 times a day (2021 22:27)  NovoLIN N 100 units/mL subcutaneous suspension: 25 unit(s) subcutaneous 2 times a day (10am and 10pm) (2021 22:27)  NovoLIN R 100 units/mL injectable solution: 5 unit(s) injectable 2 times a day (10am and 10pm) (2021 22:27)  olopatadine 0.1% ophthalmic solution: 1 drop(s) to both eye 2 times a day (2021 22:27)  torsemide 20 mg oral tablet: 1 tab(s) orally every other day (2021 22:27)  Vitamin B Complex 100: 1 tab(s) orally once a day (2021 22:27)      MEDICATIONS  (STANDING):  allopurinol 100 milliGRAM(s) Oral daily  aspirin enteric coated 81 milliGRAM(s) Oral daily  atorvastatin 80 milliGRAM(s) Oral at bedtime  buMETAnide 1 milliGRAM(s) Oral two times a day  dextrose 40% Gel 15 Gram(s) Oral once  dextrose 5%. 1000 milliLiter(s) (50 mL/Hr) IV Continuous <Continuous>  dextrose 5%. 1000 milliLiter(s) (100 mL/Hr) IV Continuous <Continuous>  dextrose 50% Injectable 25 Gram(s) IV Push once  dextrose 50% Injectable 12.5 Gram(s) IV Push once  dextrose 50% Injectable 25 Gram(s) IV Push once  gabapentin 300 milliGRAM(s) Oral three times a day  glucagon  Injectable 1 milliGRAM(s) IntraMuscular once  heparin   Injectable 5000 Unit(s) SubCutaneous every 8 hours  hydrALAZINE 50 milliGRAM(s) Oral three times a day  insulin glargine Injectable (LANTUS) 45 Unit(s) SubCutaneous at bedtime  insulin lispro (ADMELOG) corrective regimen sliding scale   SubCutaneous three times a day before meals  insulin lispro (ADMELOG) corrective regimen sliding scale   SubCutaneous at bedtime  insulin lispro Injectable (ADMELOG) 15 Unit(s) SubCutaneous three times a day before meals  isosorbide   dinitrate Tablet (ISORDIL) 10 milliGRAM(s) Oral three times a day  metoprolol succinate  milliGRAM(s) Oral two times a day  multivitamin 1 Tablet(s) Oral daily    MEDICATIONS  (PRN):  acetaminophen   Tablet .. 650 milliGRAM(s) Oral every 6 hours PRN Temp greater or equal to 38C (100.4F), Mild Pain (1 - 3), Moderate Pain (4 - 6)  ketotifen 0.025% Ophthalmic Solution 1 Drop(s) Both EYES two times a day PRN Allergic Conjunctivitis      Allergies    No Known Allergies    Intolerances      Review of Systems:  Constitutional: No fever, chills   Neuro: No tremors, headache   Cardiovascular: No chest pain, palpitations  Respiratory: No SOB, no cough  GI: No nausea, vomiting, abdominal pain  : No dysuria, polyuria   Skin: no rash, ulcers   Psych: no depression, anxiety   Endocrine: no polyphagia, polydipsia     ALL OTHER SYSTEMS REVIEWED AND NEGATIVE        PHYSICAL EXAM:      ICU Vital Signs Last 24 Hrs  T(C): 37.1 (19 May 2021 13:13), Max: 37.1 (19 May 2021 13:13)  T(F): 98.8 (19 May 2021 13:13), Max: 98.8 (19 May 2021 13:13)  HR: 60 (19 May 2021 13:13) (60 - 66)  BP: 134/64 (19 May 2021 13:13) (113/60 - 150/81)  BP(mean): --  ABP: --  ABP(mean): --  RR: 17 (19 May 2021 13:13) (17 - 18)  SpO2: 100% (19 May 2021 13:13) (98% - 100%)            GENERAL: NAD, well-groomed, well-developed  EYES: No proptosis, anicteric  HEENT:  Atraumatic, Normocephalic, moist mucous membranes  THYROID: Normal size, right sided nodule, nontender.   RESPIRATORY: Clear to auscultation bilaterally; No rales, rhonchi, wheezing  CARDIOVASCULAR: Regular rate and rhythm; No murmurs  GI: Soft, nontender, non distended, normal bowel sounds  SKIN: Dry, intact, No rashes or lesions  NEURO: AOx3, moves all extremities spontaneously       Labs:                          9.5    6.28  )-----------( 134      ( 19 May 2021 08:21 )             28.6   05-19    130<L>  |  91<L>  |  54<H>  ----------------------------<  116<H>  4.4   |  27  |  4.02<H>    Ca    10.1      19 May 2021 08:21  Phos  4.7     05-19  Mg     2.2     05-19

## 2021-05-19 NOTE — CONSULT NOTE ADULT - ASSESSMENT
Assessment/Plan:     71 F RADHA on CKD3,  CHF exacerbation, requiring HD, IR placed RIJ nontunneled HD 5/13, patient now requiring continuing HD, IR consulted for conversion to tunneled HD.     per team, patient on ASA but otherwise optimized for discharge, can place tunneled HD with ASA tomorrow rather than hold 5 days.    plan for  above procedure tomorrow 5/20 . can put order and preprocedure note for IR procedure under Dr. Cristobal  NPO AMN for sedation  please recheck CBC BMP Coags 4AM

## 2021-05-19 NOTE — PROGRESS NOTE ADULT - ASSESSMENT
71 y.o. Female w/ hx HTN, DM2 with neuropathy, Recently diagnosed CKD, PAD s/p angiogram RLE (pt denies any stents, just "clearing out"), HLD, and Thyroid nodules (benign biopsy x2 as per patient) p/w worsening SOB, LE edema, orthopnea, weight gain, and increasing abdominal girth likely 2/2 CHF exacerbation. Also with complaints of new intermittent double vision (occasionally when watching TV, does not occur otherwise) and new dysphagia intermittently. Started on diuresis, eventually placed on bumex drip. Cardio and nephro on board. Cr worsening and not improving. Decision made to start pt on HD. Cedric placed on 5/13. Started on HD, plan for transition to permacath in AM.

## 2021-05-20 LAB
ANION GAP SERPL CALC-SCNC: 14 MMOL/L — SIGNIFICANT CHANGE UP (ref 7–14)
BASOPHILS # BLD AUTO: 0.03 K/UL — SIGNIFICANT CHANGE UP (ref 0–0.2)
BASOPHILS NFR BLD AUTO: 0.4 % — SIGNIFICANT CHANGE UP (ref 0–2)
BUN SERPL-MCNC: 70 MG/DL — HIGH (ref 7–23)
CALCIUM SERPL-MCNC: 10.3 MG/DL — SIGNIFICANT CHANGE UP (ref 8.4–10.5)
CHLORIDE SERPL-SCNC: 89 MMOL/L — LOW (ref 98–107)
CO2 SERPL-SCNC: 26 MMOL/L — SIGNIFICANT CHANGE UP (ref 22–31)
CREAT SERPL-MCNC: 4.54 MG/DL — HIGH (ref 0.5–1.3)
EOSINOPHIL # BLD AUTO: 0.32 K/UL — SIGNIFICANT CHANGE UP (ref 0–0.5)
EOSINOPHIL NFR BLD AUTO: 4.5 % — SIGNIFICANT CHANGE UP (ref 0–6)
GLUCOSE SERPL-MCNC: 106 MG/DL — HIGH (ref 70–99)
HCT VFR BLD CALC: 30.2 % — LOW (ref 34.5–45)
HGB BLD-MCNC: 9.6 G/DL — LOW (ref 11.5–15.5)
IANC: 3.84 K/UL — SIGNIFICANT CHANGE UP (ref 1.5–8.5)
IMM GRANULOCYTES NFR BLD AUTO: 0.4 % — SIGNIFICANT CHANGE UP (ref 0–1.5)
LYMPHOCYTES # BLD AUTO: 2.15 K/UL — SIGNIFICANT CHANGE UP (ref 1–3.3)
LYMPHOCYTES # BLD AUTO: 29.9 % — SIGNIFICANT CHANGE UP (ref 13–44)
MAGNESIUM SERPL-MCNC: 2.2 MG/DL — SIGNIFICANT CHANGE UP (ref 1.6–2.6)
MCHC RBC-ENTMCNC: 27.4 PG — SIGNIFICANT CHANGE UP (ref 27–34)
MCHC RBC-ENTMCNC: 31.8 GM/DL — LOW (ref 32–36)
MCV RBC AUTO: 86 FL — SIGNIFICANT CHANGE UP (ref 80–100)
MONOCYTES # BLD AUTO: 0.81 K/UL — SIGNIFICANT CHANGE UP (ref 0–0.9)
MONOCYTES NFR BLD AUTO: 11.3 % — SIGNIFICANT CHANGE UP (ref 2–14)
NEUTROPHILS # BLD AUTO: 3.84 K/UL — SIGNIFICANT CHANGE UP (ref 1.8–7.4)
NEUTROPHILS NFR BLD AUTO: 53.5 % — SIGNIFICANT CHANGE UP (ref 43–77)
NRBC # BLD: 0 /100 WBCS — SIGNIFICANT CHANGE UP
NRBC # FLD: 0 K/UL — SIGNIFICANT CHANGE UP
PHOSPHATE SERPL-MCNC: 5.9 MG/DL — HIGH (ref 2.5–4.5)
PLATELET # BLD AUTO: 145 K/UL — LOW (ref 150–400)
POTASSIUM SERPL-MCNC: 4.7 MMOL/L — SIGNIFICANT CHANGE UP (ref 3.5–5.3)
POTASSIUM SERPL-SCNC: 4.7 MMOL/L — SIGNIFICANT CHANGE UP (ref 3.5–5.3)
RBC # BLD: 3.51 M/UL — LOW (ref 3.8–5.2)
RBC # FLD: 13.2 % — SIGNIFICANT CHANGE UP (ref 10.3–14.5)
SODIUM SERPL-SCNC: 129 MMOL/L — LOW (ref 135–145)
WBC # BLD: 7.18 K/UL — SIGNIFICANT CHANGE UP (ref 3.8–10.5)
WBC # FLD AUTO: 7.18 K/UL — SIGNIFICANT CHANGE UP (ref 3.8–10.5)

## 2021-05-20 PROCEDURE — 36558 INSERT TUNNELED CV CATH: CPT | Mod: RT

## 2021-05-20 PROCEDURE — 77001 FLUOROGUIDE FOR VEIN DEVICE: CPT | Mod: 26,GC

## 2021-05-20 PROCEDURE — 99232 SBSQ HOSP IP/OBS MODERATE 35: CPT

## 2021-05-20 RX ORDER — SODIUM CHLORIDE 9 MG/ML
10 INJECTION INTRAMUSCULAR; INTRAVENOUS; SUBCUTANEOUS
Refills: 0 | Status: DISCONTINUED | OUTPATIENT
Start: 2021-05-20 | End: 2021-05-22

## 2021-05-20 RX ORDER — CHLORHEXIDINE GLUCONATE 213 G/1000ML
1 SOLUTION TOPICAL
Refills: 0 | Status: DISCONTINUED | OUTPATIENT
Start: 2021-05-20 | End: 2021-05-22

## 2021-05-20 RX ORDER — BUMETANIDE 0.25 MG/ML
2 INJECTION INTRAMUSCULAR; INTRAVENOUS EVERY 12 HOURS
Refills: 0 | Status: DISCONTINUED | OUTPATIENT
Start: 2021-05-20 | End: 2021-05-22

## 2021-05-20 RX ADMIN — CHLORHEXIDINE GLUCONATE 1 APPLICATION(S): 213 SOLUTION TOPICAL at 09:02

## 2021-05-20 RX ADMIN — BUMETANIDE 2 MILLIGRAM(S): 0.25 INJECTION INTRAMUSCULAR; INTRAVENOUS at 06:03

## 2021-05-20 RX ADMIN — ISOSORBIDE DINITRATE 10 MILLIGRAM(S): 5 TABLET ORAL at 21:00

## 2021-05-20 RX ADMIN — INSULIN GLARGINE 40 UNIT(S): 100 INJECTION, SOLUTION SUBCUTANEOUS at 22:20

## 2021-05-20 RX ADMIN — CHLORHEXIDINE GLUCONATE 1 APPLICATION(S): 213 SOLUTION TOPICAL at 14:23

## 2021-05-20 RX ADMIN — Medication 100 MILLIGRAM(S): at 06:03

## 2021-05-20 RX ADMIN — Medication 650 MILLIGRAM(S): at 22:01

## 2021-05-20 RX ADMIN — Medication 81 MILLIGRAM(S): at 09:00

## 2021-05-20 RX ADMIN — Medication 650 MILLIGRAM(S): at 21:01

## 2021-05-20 RX ADMIN — Medication 1 TABLET(S): at 09:01

## 2021-05-20 RX ADMIN — ISOSORBIDE DINITRATE 10 MILLIGRAM(S): 5 TABLET ORAL at 06:01

## 2021-05-20 RX ADMIN — GABAPENTIN 300 MILLIGRAM(S): 400 CAPSULE ORAL at 06:02

## 2021-05-20 RX ADMIN — Medication 2: at 18:29

## 2021-05-20 RX ADMIN — GABAPENTIN 300 MILLIGRAM(S): 400 CAPSULE ORAL at 14:23

## 2021-05-20 RX ADMIN — BUMETANIDE 2 MILLIGRAM(S): 0.25 INJECTION INTRAMUSCULAR; INTRAVENOUS at 21:00

## 2021-05-20 RX ADMIN — Medication 25 MILLIGRAM(S): at 06:02

## 2021-05-20 RX ADMIN — GABAPENTIN 300 MILLIGRAM(S): 400 CAPSULE ORAL at 21:01

## 2021-05-20 RX ADMIN — Medication 10 UNIT(S): at 13:24

## 2021-05-20 RX ADMIN — HEPARIN SODIUM 5000 UNIT(S): 5000 INJECTION INTRAVENOUS; SUBCUTANEOUS at 06:02

## 2021-05-20 RX ADMIN — ATORVASTATIN CALCIUM 80 MILLIGRAM(S): 80 TABLET, FILM COATED ORAL at 21:00

## 2021-05-20 RX ADMIN — Medication 100 MILLIGRAM(S): at 09:01

## 2021-05-20 NOTE — PROGRESS NOTE ADULT - PROBLEM SELECTOR PLAN 1
- Acute on chronic HFpEF, initiated on HD   - s/p RHC 5/4 c/w severe pulm HTN  - TTE normal LVEF  - S/p metolazone x2  - continue hydralazine 25mg tid, isordil 10 tid, Toprol 100 mg po BID. (BP meds being held on HD days to optimize fluid removal)   - 1.5L fluid restriction, daily weights, I/O   - Cont HD for volume optimization.   - Appreciate HF recs.  - Transitioned to PO Bumex today

## 2021-05-20 NOTE — PROCEDURE NOTE - ADDITIONAL PROCEDURE DETAILS
-monitor for bleeding  -head of bed >45 degrees to prevent oozing  - tip is at the SVC, catheter is OK to use.   - all other orders and diet may be resumed per primary team Exchange of nontunneled for right chest wall tunneled HD catheter      PLAN  -monitor for bleeding  -head of bed >45 degrees to prevent oozing  - tip is at the SVC, catheter is OK to use.   - all other orders and diet may be resumed per primary team

## 2021-05-20 NOTE — PROGRESS NOTE ADULT - PROBLEM SELECTOR PLAN 8
DVT ppx - Heparin 5,000u sc tid    Dispo- awaiting outpt HD facility setup    PT eval: outpt PT  Daughter Tootie expressed concerns about transportation to and from HD: SW aware and will discuss with family

## 2021-05-20 NOTE — PROGRESS NOTE ADULT - PROBLEM SELECTOR PLAN 2
- Initiated on HD for volume overload   - S/p Cedric on 5/13.  - Diuresis as above. HD as per nephro  - Pt s/p permacath today  - Outpt f/u for AVF placement (if needed)

## 2021-05-20 NOTE — PROGRESS NOTE ADULT - PROBLEM SELECTOR PLAN 6
-FS improved, noted to have <100 FSG readings and was given decreased premeal insulin.  Endocrinology team made adjustments   -Appreciate endo recs.  -Current on basal/bolus w/ ISS. Titrate as per endo

## 2021-05-20 NOTE — PROGRESS NOTE ADULT - SUBJECTIVE AND OBJECTIVE BOX
Interventional Radiology    HPI: 71y Female presents for conversion of nontunneled to tunneled HD catheter    Allergies:   Medications (Abx/Cardiac/Anticoagulation/Blood Products)    aspirin enteric coated: 81 milliGRAM(s) Oral (05-20 @ 09:00)  buMETAnide Injectable: 2 milliGRAM(s) IV Push (05-20 @ 06:03)  heparin   Injectable: 5000 Unit(s) SubCutaneous (05-20 @ 06:02)  hydrALAZINE: 25 milliGRAM(s) Oral (05-20 @ 06:02)  isosorbide   dinitrate Tablet (ISORDIL): 10 milliGRAM(s) Oral (05-20 @ 06:01)  metoprolol succinate ER: 100 milliGRAM(s) Oral (05-20 @ 06:03)    Data:    T(C): 36.8  HR: 62  BP: 115/62  RR: 18  SpO2: 100%    Exam  General: No acute distress  Chest: Non labored breathing  Abdomen: Non-distended  Extremities: No swelling, warm    -WBC 7.18 / HgB 9.6 / Hct 30.2 / Plt 145  -Na 129 / Cl 89 / BUN 70 / Glucose 106  -K 4.7 / CO2 26 / Cr 4.54  -ALT -- / Alk Phos -- / T.Bili --    Imaging:     Plan: 71y Female presents for above procedure  -Risks/Benefits/alternatives explained with the patient and/or healthcare proxy and witnessed informed consent obtained.

## 2021-05-20 NOTE — PROGRESS NOTE ADULT - SUBJECTIVE AND OBJECTIVE BOX
Carlo Dawkins NP  CCU Service  Cardiology   Spect: [phone] ([facility])     PATIENT: YON WARREN, MRN: [id number]    CHIEF COMPLAINT: Patient is a 71y old  Female who presents with a chief complaint of Worsening SOB (19 May 2021 16:20)      INTERVAL HISTORY/OVERNIGHT EVENTS: No overnight events. Not on tele. Denies abdominal pain, chest pain or SOB, cough. Has been ambulating with assistance. Oriented to person, place, and time. Breathing comfortably on room air.    REVIEW OF SYSTEMS:    Constitutional:     [ ] negative [ ] fevers [ ] chills [ ] weight loss [ ] weight gain  HEENT:                  [ ] negative [ ] dry eyes [ ] eye irritation [ ] postnasal drip [ ] nasal congestion  CV:                         [ ] negative  [ ] chest pain [ ] orthopnea [ ] palpitations [ ] murmur  Resp:                     [ ] negative [ ] cough [ ] shortness of breath [ ] dyspnea [ ] wheezing [ ] sputum [ ] hemoptysis  GI:                          [ ] negative [ ] nausea [ ] vomiting [ ] diarrhea [ ] constipation [ ] abd pain [ ] dysphagia   :                        [ ] negative [ ] dysuria [ ] nocturia [ ] hematuria [ ] increased urinary frequency  Musculoskeletal: [ ] negative [ ] back pain [ ] myalgias [ ] arthralgias [ ] fracture  Skin:                       [ ] negative [ ] rash [ ] itch  Neurological:        [ ] negative [ ] headache [ ] dizziness [ ] syncope [ ] weakness [ ] numbness  Psychiatric:           [ ] negative [ ] anxiety [ ] depression  Endocrine:            [ ] negative [ ] diabetes [ ] thyroid problem  Heme/Lymph:      [ ] negative [ ] anemia [ ] bleeding problem  Allergic/Immune: [ ] negative [ ] itchy eyes [ ] nasal discharge [ ] hives [ ] angioedema    [ ] All other systems negative  [ ] Unable to assess ROS because ________.    MEDICATIONS:  MEDICATIONS  (STANDING):  allopurinol 100 milliGRAM(s) Oral daily  aspirin enteric coated 81 milliGRAM(s) Oral daily  atorvastatin 80 milliGRAM(s) Oral at bedtime  buMETAnide Injectable 2 milliGRAM(s) IV Push two times a day  gabapentin 300 milliGRAM(s) Oral three times a day  hydrALAZINE 25 milliGRAM(s) Oral three times a day  insulin glargine Injectable (LANTUS) 40 Unit(s) SubCutaneous at bedtime  insulin lispro (ADMELOG) corrective regimen sliding scale   SubCutaneous three times a day before meals  insulin lispro (ADMELOG) corrective regimen sliding scale   SubCutaneous at bedtime  insulin lispro Injectable (ADMELOG) 10 Unit(s) SubCutaneous three times a day before meals  isosorbide   dinitrate Tablet (ISORDIL) 10 milliGRAM(s) Oral three times a day  metoprolol succinate  milliGRAM(s) Oral two times a day  multivitamin 1 Tablet(s) Oral daily  senna 2 Tablet(s) Oral at bedtime    MEDICATIONS  (PRN):  acetaminophen   Tablet .. 650 milliGRAM(s) Oral every 6 hours PRN Temp greater or equal to 38C (100.4F), Mild Pain (1 - 3), Moderate Pain (4 - 6)  ketotifen 0.025% Ophthalmic Solution 1 Drop(s) Both EYES two times a day PRN Allergic Conjunctivitis      ALLERGIES: Allergies    No Known Allergies    Intolerances        OBJECTIVE:  ICU Vital Signs Last 24 Hrs  T(C): 36.8 (20 May 2021 05:58), Max: 37.1 (19 May 2021 13:13)  T(F): 98.2 (20 May 2021 05:58), Max: 98.8 (19 May 2021 13:13)  HR: 62 (20 May 2021 05:58) (60 - 63)  BP: 115/62 (20 May 2021 05:58) (115/62 - 166/96)  RR: 18 (20 May 2021 05:58) (16 - 18)  SpO2: 100% (20 May 2021 05:58) (100% - 100%)      CAPILLARY BLOOD GLUCOSE      POCT Blood Glucose.: 111 mg/dL (20 May 2021 08:18)  POCT Blood Glucose.: 116 mg/dL (20 May 2021 06:01)  POCT Blood Glucose.: 124 mg/dL (20 May 2021 00:43)  POCT Blood Glucose.: 141 mg/dL (19 May 2021 22:33)  POCT Blood Glucose.: 198 mg/dL (19 May 2021 17:17)  POCT Blood Glucose.: 149 mg/dL (19 May 2021 12:22)  POCT Blood Glucose.: 130 mg/dL (19 May 2021 08:34)    CAPILLARY BLOOD GLUCOSE      POCT Blood Glucose.: 111 mg/dL (20 May 2021 08:18)    I&O's Summary    19 May 2021 07:01  -  20 May 2021 07:00  --------------------------------------------------------  IN: 400 mL / OUT: 2400 mL / NET: -2000 mL      Daily     Daily Weight in k (19 May 2021 21:25)    PHYSICAL EXAMINATION:  General: Comfortable, no acute distress, cooperative with exam.  HEENT: Moist mucous membranes.  Respiratory: CTAB, normal respiratory effort, no coughing, wheezes, crackles, or rales.  CV: RRR, S1S2, no murmurs, rubs or gallops. No JVD. Distal pulses intact.  Abdominal: Soft, nontender, nondistended, no rebound or guarding, normal bowel sounds.  Neurology: AOx3, no focal neuro defects, CORNELIUS x 4.  Extremities: No pitting edema, + Peripheral pulses.  Incisions:   Tubes:    LABS:                          9.6    7.18  )-----------( 145      ( 20 May 2021 07:54 )             30.2     05-19    130<L>  |  91<L>  |  54<H>  ----------------------------<  116<H>  4.4   |  27  |  4.02<H>    Ca    10.1      19 May 2021 08:21  Phos  4.7     05  Mg     2.2     05-19    TELEMETRY: None     EKG:     IMAGING:   Transthoracic Echocardiogram (21 @ 14:49) >  DIMENSIONS:  Dimensions:     Normal Values:  LA:     3.6 cm    2.0 - 4.0 cm  Ao:     3.0 cm    2.0 - 3.8 cm  SEPTUM: 0.8 cm    0.6 - 1.2 cm  PWT:    0.8 cm    0.6 - 1.1 cm  LVIDd:  4.4 cm    3.0 - 5.6 cm  LVIDs:  2.8 cm    1.8 - 4.0 cm  Derived Variables:  LVMI: 53 g/m2  RWT: 0.36  Fractional short: 36 %  Ejection Fraction (Teicholtz): 66 %  ------------------------------------------------------------------------  OBSERVATIONS:  Mitral Valve: Mitral annular calcification, otherwise  normal mitral valve. Minimal mitral regurgitation.  Aortic Root: Normal aortic root.  Aortic Valve: Aortic valve leaflet morphology not well  visualized. Peak transaortic valve gradient equals 30 mm  Hg, mean transaortic valve gradient equals 18 mm Hg,  estimated aortic valve area equals 1.6 sqcm (by continuity  equation), consistent with mild aortic stenosis.  Left Atrium: Normal left atrium.  LA volume index = 26  cc/m2.  Left Ventricle: Endocardium not well visualized; grossly  normal left ventricular systolic function. Normal left  ventricular internal dimensions and wall thicknesses. Mild  diastolic dysfunction (Stage I).  Right Heart: Normal right atrium. The right ventricle is  not well visualized; grossly normal right ventricular  systolic function. Normal tricuspid valve. Minimal  tricuspid regurgitation. Normal pulmonic valve. Minimal  pulmonic regurgitation.  Pericardium/PleuraNormal pericardium with no pericardial  effusion.  ------------------------------------------------------------------------  CONCLUSIONS:  1. Mitral annular calcification, otherwise normal mitral  valve. Minimal mitral regurgitation.  2. Aortic valve leafletmorphology not well visualized.  Peak transaortic valve gradient equals 30 mm Hg, mean  transaortic valve gradient equals 18 mm Hg, estimated  aortic valve area equals 1.6 sqcm (by continuity equation),  consistent with mild aortic stenosis.  3. Normal left ventricular internal dimensions and wall  thicknesses.  4. Endocardium not well visualized; grossly normal left  ventricular systolic function.  5. Mild diastolic dysfunction (Stage I).  6. The right ventricle is not well visualized; grossly  normal right ventricular systolic function.

## 2021-05-20 NOTE — PROGRESS NOTE ADULT - ASSESSMENT
71 y/p female with PMHX of HTN, DM II, HLD, PAD s/p RLE angiogram, CKD comes in with complaints of worsening SOB, LE edema, abdominal distension.   CXR shows clear lungs, LE dopplers show no DVT, CT head unremarkable. Labs significant for BUN/Cr 46/2.43, K 5.2, proBNP 303. She states she was recently told she had a weak heart and has been placed on oral diuretics, but with minimal relief. She admits to 4 pillow orthopnea, BREEN w/ minimal exertion. No PND, CP, palpitations, dizziness. She has had a stress test with her cardiologist Dr. Alas a few months ago, but unclear of the results. Admits to drinking 1 pint of vodka most weekends. Non smoker, no other drug use.    5/4/21 RHC: RA 20; PA systolic 69, PAD 21, PA mean 44, PCWP 25-30 with respiratory variation, Hemoglobin 9.9, PA sat 70's, CO 8.4/ CI 4- volume overloaded    Improving volume status. Awaiting decision regarding long term HD.    Acute diastolic heart fialure     - Euvolemic on exam  - JVD not elevated   - s/p HD 5/19, removed to 2L   - Change Bumex 2 IV BID to PO   - Continue isordil 10 tid, Toprol 100 mg po BID, hydralazine 25mg PO TID   - Keep k 4.0-5.0 and mag 2.0.   - Daily standing weights and strict I/O.   - Plan for HD tunnel cath            71 y/p female with PMHX of HTN, DM II, HLD, PAD s/p RLE angiogram, CKD comes in with complaints of worsening SOB, LE edema, abdominal distension.   CXR shows clear lungs, LE dopplers show no DVT, CT head unremarkable. Labs significant for BUN/Cr 46/2.43, K 5.2, proBNP 303. She states she was recently told she had a weak heart and has been placed on oral diuretics, but with minimal relief. She admits to 4 pillow orthopnea, BREEN w/ minimal exertion. No PND, CP, palpitations, dizziness. She has had a stress test with her cardiologist Dr. Alas a few months ago, but unclear of the results. Admits to drinking 1 pint of vodka most weekends. Non smoker, no other drug use.    5/4/21 RHC: RA 20; PA systolic 69, PAD 21, PA mean 44, PCWP 25-30 with respiratory variation, Hemoglobin 9.9, PA sat 70's, CO 8.4/ CI 4- volume overloaded    Improving volume status. Awaiting decision regarding long term HD.    Acute diastolic heart fialure     - Euvolemic on exam  - JVD not elevated   - s/p HD 5/19, removed to 2L   - Change Bumex 2 IV BID to PO   - Continue isordil 10 tid, Toprol 100 mg po BID, hydralazine 25mg PO TID   - Keep k 4.0-5.0 and mag 2.0.   - Daily standing weights and strict I/O.   - Plan for HD tunnel cath, for HD today

## 2021-05-20 NOTE — PROGRESS NOTE ADULT - SUBJECTIVE AND OBJECTIVE BOX
Nephrology Followup Note - [phone] - Dr Thompson / Dr Murdock / Dr Morocho / Dr Kendrick / Dr Magallanes / Dr Alfaro / Dr Escudero / Dr Raymundo  Pt seen and examined at bedside  No acute events overnight. Pt c/o cramping last night, after UF treatment.     Allergies:  No Known Allergies    Hospital Medications:   MEDICATIONS  (STANDING):  allopurinol 100 milliGRAM(s) Oral daily  aspirin enteric coated 81 milliGRAM(s) Oral daily  atorvastatin 80 milliGRAM(s) Oral at bedtime  buMETAnide 2 milliGRAM(s) Oral every 12 hours  chlorhexidine 2% Cloths 1 Application(s) Topical daily  chlorhexidine 4% Liquid 1 Application(s) Topical <User Schedule>  dextrose 40% Gel 15 Gram(s) Oral once  dextrose 5%. 1000 milliLiter(s) (50 mL/Hr) IV Continuous <Continuous>  dextrose 5%. 1000 milliLiter(s) (100 mL/Hr) IV Continuous <Continuous>  dextrose 50% Injectable 25 Gram(s) IV Push once  dextrose 50% Injectable 12.5 Gram(s) IV Push once  dextrose 50% Injectable 25 Gram(s) IV Push once  gabapentin 300 milliGRAM(s) Oral three times a day  glucagon  Injectable 1 milliGRAM(s) IntraMuscular once  hydrALAZINE 25 milliGRAM(s) Oral three times a day  insulin glargine Injectable (LANTUS) 40 Unit(s) SubCutaneous at bedtime  insulin lispro (ADMELOG) corrective regimen sliding scale   SubCutaneous three times a day before meals  insulin lispro (ADMELOG) corrective regimen sliding scale   SubCutaneous at bedtime  insulin lispro Injectable (ADMELOG) 10 Unit(s) SubCutaneous three times a day before meals  isosorbide   dinitrate Tablet (ISORDIL) 10 milliGRAM(s) Oral three times a day  metoprolol succinate  milliGRAM(s) Oral two times a day  multivitamin 1 Tablet(s) Oral daily  senna 2 Tablet(s) Oral at bedtime    VITALS:  T(F): 98 (05-20-21 @ 15:04), Max: 98.6 (05-19-21 @ 17:30)  HR: 60 (05-20-21 @ 15:04)  BP: 112/66 (05-20-21 @ 15:04)  RR: 16 (05-20-21 @ 15:04)  SpO2: 100% (05-20-21 @ 15:04)  Wt(kg): --    05-19 @ 07:01  -  05-20 @ 07:00  --------------------------------------------------------  IN: 400 mL / OUT: 2400 mL / NET: -2000 mL        PHYSICAL EXAM:  Constitutional: NAD  HEENT: anicteric sclera, oropharynx clear, MMM  Neck: No JVD  Respiratory: CTAB, no wheezes, rales or rhonchi  Cardiovascular: S1, S2, RRR  Gastrointestinal: BS+, soft, NT/ND  Extremities: No cyanosis or clubbing. No peripheral edema  Neurological: A/O x 3, no focal deficits  Psychiatric: Normal mood, normal affect  : No CVA tenderness. No paredes.   Skin: No rashes  Vascular Access: RIJ Tunneled cath.     LABS:  05-20    129<L>  |  89<L>  |  70<H>  ----------------------------<  106<H>  4.7   |  26  |  4.54<H>    Ca    10.3      20 May 2021 07:54  Phos  5.9     05-20  Mg     2.2     05-20      Creatinine Trend: 4.54 <--, 4.02 <--, 3.50 <--, 4.72 <--, 3.75 <--, 3.84 <--, 4.39 <--                        9.6    7.18  )-----------( 145      ( 20 May 2021 07:54 )             30.2     Urine Studies:      RADIOLOGY & ADDITIONAL STUDIES:

## 2021-05-20 NOTE — PROGRESS NOTE ADULT - ASSESSMENT
71 y.o. Female w/ hx HTN, DM2 with neuropathy, Recently diagnosed CKD, PAD s/p angiogram RLE (pt denies any stents, just "clearing out"), HLD, and Thyroid nodules (benign biopsy x2 as per patient) p/w worsening SOB, LE edema, orthopnea, weight gain, and increasing abdominal girth likely 2/2 CHF exacerbation. Also with complaints of new intermittent double vision (occasionally when watching TV, does not occur otherwise) and new dysphagia intermittently. Started on diuresis, eventually placed on bumex drip. Cardio and nephro on board. Cr worsening and not improving. Decision made to start pt on HD. Cedric placed on 5/13. Started on HD, now s/p permacath 5/20 awaiting outpt HD placement.

## 2021-05-20 NOTE — PROGRESS NOTE ADULT - PROBLEM SELECTOR PLAN 1
Pt with CKD3, follows with Dr Escudero in outpt clinic.   Baseline Cr ~2 from earlier this year  Worsening renal failure, requiring HD start.   shiley cath changed to tunneled cath earlier today.   Isolated UF yesterday with 2kg UF achieved, plan for HD today.   Euvolemic, as per HF notes.   Will need outpt HD unit placement.   dose all meds for eGFR<15ml/min.   avoid ACEi/ARB/NSAIDs/Nephrotoxics.

## 2021-05-20 NOTE — PROGRESS NOTE ADULT - SUBJECTIVE AND OBJECTIVE BOX
[facility]  Areli Celeste DO  Pager (URIEL-F, 2W-5P): [phone]    Patient is a 71y old  Female who presents with a chief complaint of Worsening SOB (20 May 2021 16:41)    SUBJECTIVE / OVERNIGHT EVENTS: Pt seen in HD now s/p permacath placement.  States she's doing well except for some mild tenderness at permacath site. Denies CP/SOB    MEDICATIONS  (STANDING):  allopurinol 100 milliGRAM(s) Oral daily  aspirin enteric coated 81 milliGRAM(s) Oral daily  atorvastatin 80 milliGRAM(s) Oral at bedtime  buMETAnide 2 milliGRAM(s) Oral every 12 hours  chlorhexidine 2% Cloths 1 Application(s) Topical daily  chlorhexidine 4% Liquid 1 Application(s) Topical <User Schedule>  dextrose 40% Gel 15 Gram(s) Oral once  dextrose 5%. 1000 milliLiter(s) (50 mL/Hr) IV Continuous <Continuous>  dextrose 5%. 1000 milliLiter(s) (100 mL/Hr) IV Continuous <Continuous>  dextrose 50% Injectable 25 Gram(s) IV Push once  dextrose 50% Injectable 12.5 Gram(s) IV Push once  dextrose 50% Injectable 25 Gram(s) IV Push once  gabapentin 300 milliGRAM(s) Oral three times a day  glucagon  Injectable 1 milliGRAM(s) IntraMuscular once  hydrALAZINE 25 milliGRAM(s) Oral three times a day  insulin glargine Injectable (LANTUS) 40 Unit(s) SubCutaneous at bedtime  insulin lispro (ADMELOG) corrective regimen sliding scale   SubCutaneous three times a day before meals  insulin lispro (ADMELOG) corrective regimen sliding scale   SubCutaneous at bedtime  insulin lispro Injectable (ADMELOG) 10 Unit(s) SubCutaneous three times a day before meals  isosorbide   dinitrate Tablet (ISORDIL) 10 milliGRAM(s) Oral three times a day  metoprolol succinate  milliGRAM(s) Oral two times a day  multivitamin 1 Tablet(s) Oral daily  senna 2 Tablet(s) Oral at bedtime    MEDICATIONS  (PRN):  acetaminophen   Tablet .. 650 milliGRAM(s) Oral every 6 hours PRN Temp greater or equal to 38C (100.4F), Mild Pain (1 - 3), Moderate Pain (4 - 6)  ketotifen 0.025% Ophthalmic Solution 1 Drop(s) Both EYES two times a day PRN Allergic Conjunctivitis  sodium chloride 0.9% lock flush 10 milliLiter(s) IV Push every 1 hour PRN Pre/post blood products, medications, blood draw, and to maintain line patency      CAPILLARY BLOOD GLUCOSE      POCT Blood Glucose.: 237 mg/dL (20 May 2021 15:20)  POCT Blood Glucose.: 111 mg/dL (20 May 2021 08:18)  POCT Blood Glucose.: 116 mg/dL (20 May 2021 06:01)  POCT Blood Glucose.: 124 mg/dL (20 May 2021 00:43)  POCT Blood Glucose.: 141 mg/dL (19 May 2021 22:33)    I&O's Summary    19 May 2021 07:01  -  20 May 2021 07:00  --------------------------------------------------------  IN: 400 mL / OUT: 2400 mL / NET: -2000 mL        PHYSICAL EXAM:  Vital Signs Last 24 Hrs  T(C): 36.5 (20 May 2021 16:05), Max: 37 (19 May 2021 17:30)  T(F): 97.7 (20 May 2021 16:05), Max: 98.6 (19 May 2021 17:30)  HR: 60 (20 May 2021 16:05) (60 - 63)  BP: 125/75 (20 May 2021 16:05) (112/66 - 166/96)  BP(mean): --  RR: 18 (20 May 2021 16:05) (16 - 18)  SpO2: 100% (20 May 2021 15:04) (100% - 100%)    CONSTITUTIONAL: NAD, well-developed, well-groomed  EYES: PERRLA; conjunctiva and sclera clear  ENMT: Moist oral mucosa, no pharyngeal injection or exudates; normal dentition  NECK: Supple, no palpable masses; no thyromegaly  RESPIRATORY: Normal respiratory effort; lungs are clear to auscultation bilaterally  CARDIOVASCULAR: Regular rate and rhythm, normal S1 and S2, no murmur/rub/gallop; No lower extremity edema; Peripheral pulses are 2+ bilaterally  ABDOMEN: Nontender to palpation, normoactive bowel sounds, no rebound/guarding; No hepatosplenomegaly  MUSCLOSKELETAL:  Normal gait; no clubbing or cyanosis of digits; no joint swelling or tenderness to palpation  PSYCH: A+O to person, place, and time; affect appropriate  NEUROLOGY: CN 2-12 are intact and symmetric; no gross sensory deficits;   SKIN: No rashes; no palpable lesions    LABS:                        9.6    7.18  )-----------( 145      ( 20 May 2021 07:54 )             30.2     05-20    129<L>  |  89<L>  |  70<H>  ----------------------------<  106<H>  4.7   |  26  |  4.54<H>    Ca    10.3      20 May 2021 07:54  Phos  5.9     05-20  Mg     2.2     05-20                  RADIOLOGY & ADDITIONAL TESTS:  Results Reviewed:   Imaging Personally Reviewed:  Electrocardiogram Personally Reviewed:    COORDINATION OF CARE:  Care Discussed with Consultants/Other Providers [Y/N]:  Prior or Outpatient Records Reviewed [Y/N]:   [facility]  Areli Celeste DO  Pager (URIEL-F, 9J-5P): [phone]    Patient is a 71y old  Female who presents with a chief complaint of Worsening SOB (20 May 2021 16:41)    SUBJECTIVE / OVERNIGHT EVENTS: Pt seen in HD now s/p permacath placement.  States she's doing well except for some mild tenderness at permacath site. Denies CP/SOB    MEDICATIONS  (STANDING):  allopurinol 100 milliGRAM(s) Oral daily  aspirin enteric coated 81 milliGRAM(s) Oral daily  atorvastatin 80 milliGRAM(s) Oral at bedtime  buMETAnide 2 milliGRAM(s) Oral every 12 hours  chlorhexidine 2% Cloths 1 Application(s) Topical daily  chlorhexidine 4% Liquid 1 Application(s) Topical <User Schedule>  dextrose 40% Gel 15 Gram(s) Oral once  dextrose 5%. 1000 milliLiter(s) (50 mL/Hr) IV Continuous <Continuous>  dextrose 5%. 1000 milliLiter(s) (100 mL/Hr) IV Continuous <Continuous>  dextrose 50% Injectable 25 Gram(s) IV Push once  dextrose 50% Injectable 12.5 Gram(s) IV Push once  dextrose 50% Injectable 25 Gram(s) IV Push once  gabapentin 300 milliGRAM(s) Oral three times a day  glucagon  Injectable 1 milliGRAM(s) IntraMuscular once  hydrALAZINE 25 milliGRAM(s) Oral three times a day  insulin glargine Injectable (LANTUS) 40 Unit(s) SubCutaneous at bedtime  insulin lispro (ADMELOG) corrective regimen sliding scale   SubCutaneous three times a day before meals  insulin lispro (ADMELOG) corrective regimen sliding scale   SubCutaneous at bedtime  insulin lispro Injectable (ADMELOG) 10 Unit(s) SubCutaneous three times a day before meals  isosorbide   dinitrate Tablet (ISORDIL) 10 milliGRAM(s) Oral three times a day  metoprolol succinate  milliGRAM(s) Oral two times a day  multivitamin 1 Tablet(s) Oral daily  senna 2 Tablet(s) Oral at bedtime    MEDICATIONS  (PRN):  acetaminophen   Tablet .. 650 milliGRAM(s) Oral every 6 hours PRN Temp greater or equal to 38C (100.4F), Mild Pain (1 - 3), Moderate Pain (4 - 6)  ketotifen 0.025% Ophthalmic Solution 1 Drop(s) Both EYES two times a day PRN Allergic Conjunctivitis  sodium chloride 0.9% lock flush 10 milliLiter(s) IV Push every 1 hour PRN Pre/post blood products, medications, blood draw, and to maintain line patency      CAPILLARY BLOOD GLUCOSE      POCT Blood Glucose.: 237 mg/dL (20 May 2021 15:20)  POCT Blood Glucose.: 111 mg/dL (20 May 2021 08:18)  POCT Blood Glucose.: 116 mg/dL (20 May 2021 06:01)  POCT Blood Glucose.: 124 mg/dL (20 May 2021 00:43)  POCT Blood Glucose.: 141 mg/dL (19 May 2021 22:33)    I&O's Summary    19 May 2021 07:01  -  20 May 2021 07:00  --------------------------------------------------------  IN: 400 mL / OUT: 2400 mL / NET: -2000 mL        PHYSICAL EXAM:  Vital Signs Last 24 Hrs  T(C): 36.5 (20 May 2021 16:05), Max: 37 (19 May 2021 17:30)  T(F): 97.7 (20 May 2021 16:05), Max: 98.6 (19 May 2021 17:30)  HR: 60 (20 May 2021 16:05) (60 - 63)  BP: 125/75 (20 May 2021 16:05) (112/66 - 166/96)  BP(mean): --  RR: 18 (20 May 2021 16:05) (16 - 18)  SpO2: 100% (20 May 2021 15:04) (100% - 100%)    CONSTITUTIONAL: NAD, well-developed, well-groomed  EYES: PERRLA; conjunctiva and sclera clear  RESPIRATORY: Normal respiratory effort; lungs are clear to auscultation bilaterally  CARDIOVASCULAR: Regular rate and rhythm, normal S1 and S2, no murmur/rub/gallop; No lower extremity edema  ABDOMEN: Nontender to palpation, normoactive bowel sounds, no rebound/guarding  MUSCLOSKELETAL:  No clubbing or cyanosis of digits; no joint swelling or tenderness to palpation  PSYCH: A+O to person, place, and time; affect appropriate  NEUROLOGY: CN 2-12 are intact and symmetric; no gross sensory deficits;   SKIN: No rashes; permacat site C/D/I    LABS:                        9.6    7.18  )-----------( 145      ( 20 May 2021 07:54 )             30.2     05-20    129<L>  |  89<L>  |  70<H>  ----------------------------<  106<H>  4.7   |  26  |  4.54<H>    Ca    10.3      20 May 2021 07:54  Phos  5.9     05-20  Mg     2.2     05-20

## 2021-05-21 ENCOUNTER — TRANSCRIPTION ENCOUNTER (OUTPATIENT)
Age: 71
End: 2021-05-21

## 2021-05-21 LAB
ANION GAP SERPL CALC-SCNC: 14 MMOL/L — SIGNIFICANT CHANGE UP (ref 7–14)
BASOPHILS # BLD AUTO: 0.03 K/UL — SIGNIFICANT CHANGE UP (ref 0–0.2)
BASOPHILS NFR BLD AUTO: 0.4 % — SIGNIFICANT CHANGE UP (ref 0–2)
BUN SERPL-MCNC: 46 MG/DL — HIGH (ref 7–23)
CALCIUM SERPL-MCNC: 9.9 MG/DL — SIGNIFICANT CHANGE UP (ref 8.4–10.5)
CHLORIDE SERPL-SCNC: 92 MMOL/L — LOW (ref 98–107)
CO2 SERPL-SCNC: 25 MMOL/L — SIGNIFICANT CHANGE UP (ref 22–31)
CREAT SERPL-MCNC: 3.99 MG/DL — HIGH (ref 0.5–1.3)
EOSINOPHIL # BLD AUTO: 0.23 K/UL — SIGNIFICANT CHANGE UP (ref 0–0.5)
EOSINOPHIL NFR BLD AUTO: 3.3 % — SIGNIFICANT CHANGE UP (ref 0–6)
GLUCOSE BLDC GLUCOMTR-MCNC: 135 MG/DL — HIGH (ref 70–99)
GLUCOSE BLDC GLUCOMTR-MCNC: 165 MG/DL — HIGH (ref 70–99)
GLUCOSE BLDC GLUCOMTR-MCNC: 169 MG/DL — HIGH (ref 70–99)
GLUCOSE SERPL-MCNC: 154 MG/DL — HIGH (ref 70–99)
HCT VFR BLD CALC: 30 % — LOW (ref 34.5–45)
HGB BLD-MCNC: 9.6 G/DL — LOW (ref 11.5–15.5)
IANC: 4.07 K/UL — SIGNIFICANT CHANGE UP (ref 1.5–8.5)
IMM GRANULOCYTES NFR BLD AUTO: 0.4 % — SIGNIFICANT CHANGE UP (ref 0–1.5)
LYMPHOCYTES # BLD AUTO: 1.69 K/UL — SIGNIFICANT CHANGE UP (ref 1–3.3)
LYMPHOCYTES # BLD AUTO: 24.5 % — SIGNIFICANT CHANGE UP (ref 13–44)
MAGNESIUM SERPL-MCNC: 2.1 MG/DL — SIGNIFICANT CHANGE UP (ref 1.6–2.6)
MCHC RBC-ENTMCNC: 27.2 PG — SIGNIFICANT CHANGE UP (ref 27–34)
MCHC RBC-ENTMCNC: 32 GM/DL — SIGNIFICANT CHANGE UP (ref 32–36)
MCV RBC AUTO: 85 FL — SIGNIFICANT CHANGE UP (ref 80–100)
MONOCYTES # BLD AUTO: 0.86 K/UL — SIGNIFICANT CHANGE UP (ref 0–0.9)
MONOCYTES NFR BLD AUTO: 12.4 % — SIGNIFICANT CHANGE UP (ref 2–14)
NEUTROPHILS # BLD AUTO: 4.07 K/UL — SIGNIFICANT CHANGE UP (ref 1.8–7.4)
NEUTROPHILS NFR BLD AUTO: 59 % — SIGNIFICANT CHANGE UP (ref 43–77)
NRBC # BLD: 0 /100 WBCS — SIGNIFICANT CHANGE UP
NRBC # FLD: 0 K/UL — SIGNIFICANT CHANGE UP
PHOSPHATE SERPL-MCNC: 5 MG/DL — HIGH (ref 2.5–4.5)
PLATELET # BLD AUTO: 125 K/UL — LOW (ref 150–400)
POTASSIUM SERPL-MCNC: 4.3 MMOL/L — SIGNIFICANT CHANGE UP (ref 3.5–5.3)
POTASSIUM SERPL-SCNC: 4.3 MMOL/L — SIGNIFICANT CHANGE UP (ref 3.5–5.3)
RBC # BLD: 3.53 M/UL — LOW (ref 3.8–5.2)
RBC # FLD: 13.4 % — SIGNIFICANT CHANGE UP (ref 10.3–14.5)
SODIUM SERPL-SCNC: 131 MMOL/L — LOW (ref 135–145)
WBC # BLD: 6.91 K/UL — SIGNIFICANT CHANGE UP (ref 3.8–10.5)
WBC # FLD AUTO: 6.91 K/UL — SIGNIFICANT CHANGE UP (ref 3.8–10.5)

## 2021-05-21 PROCEDURE — 99232 SBSQ HOSP IP/OBS MODERATE 35: CPT

## 2021-05-21 RX ADMIN — ISOSORBIDE DINITRATE 10 MILLIGRAM(S): 5 TABLET ORAL at 13:15

## 2021-05-21 RX ADMIN — Medication 100 MILLIGRAM(S): at 11:28

## 2021-05-21 RX ADMIN — Medication 2: at 08:28

## 2021-05-21 RX ADMIN — Medication 25 MILLIGRAM(S): at 13:14

## 2021-05-21 RX ADMIN — GABAPENTIN 300 MILLIGRAM(S): 400 CAPSULE ORAL at 21:27

## 2021-05-21 RX ADMIN — Medication 25 MILLIGRAM(S): at 05:40

## 2021-05-21 RX ADMIN — Medication 1 TABLET(S): at 11:29

## 2021-05-21 RX ADMIN — GABAPENTIN 300 MILLIGRAM(S): 400 CAPSULE ORAL at 05:40

## 2021-05-21 RX ADMIN — Medication 10 UNIT(S): at 12:46

## 2021-05-21 RX ADMIN — BUMETANIDE 2 MILLIGRAM(S): 0.25 INJECTION INTRAMUSCULAR; INTRAVENOUS at 21:26

## 2021-05-21 RX ADMIN — INSULIN GLARGINE 40 UNIT(S): 100 INJECTION, SOLUTION SUBCUTANEOUS at 21:27

## 2021-05-21 RX ADMIN — Medication 81 MILLIGRAM(S): at 11:28

## 2021-05-21 RX ADMIN — ATORVASTATIN CALCIUM 80 MILLIGRAM(S): 80 TABLET, FILM COATED ORAL at 21:26

## 2021-05-21 RX ADMIN — CHLORHEXIDINE GLUCONATE 1 APPLICATION(S): 213 SOLUTION TOPICAL at 11:29

## 2021-05-21 RX ADMIN — Medication 10 UNIT(S): at 08:34

## 2021-05-21 RX ADMIN — ISOSORBIDE DINITRATE 10 MILLIGRAM(S): 5 TABLET ORAL at 05:40

## 2021-05-21 RX ADMIN — BUMETANIDE 2 MILLIGRAM(S): 0.25 INJECTION INTRAMUSCULAR; INTRAVENOUS at 08:31

## 2021-05-21 RX ADMIN — CHLORHEXIDINE GLUCONATE 1 APPLICATION(S): 213 SOLUTION TOPICAL at 05:40

## 2021-05-21 RX ADMIN — Medication 100 MILLIGRAM(S): at 05:40

## 2021-05-21 RX ADMIN — Medication 25 MILLIGRAM(S): at 21:26

## 2021-05-21 RX ADMIN — Medication 2: at 12:46

## 2021-05-21 RX ADMIN — GABAPENTIN 300 MILLIGRAM(S): 400 CAPSULE ORAL at 13:14

## 2021-05-21 RX ADMIN — ISOSORBIDE DINITRATE 10 MILLIGRAM(S): 5 TABLET ORAL at 21:27

## 2021-05-21 RX ADMIN — Medication 100 MILLIGRAM(S): at 17:23

## 2021-05-21 RX ADMIN — Medication 10 UNIT(S): at 17:21

## 2021-05-21 NOTE — DISCHARGE NOTE PROVIDER - NSDCFUADDAPPT_GEN_ALL_CORE_FT
Thyroid nodule to be followed in one year - RLP hypoechoic nodule 13mm and LLP isoechoic nodule 1.9 cm. Both benign according to pt in the past but would need records. Can follow up outpatient with your Please call to make an appointment to follow up with your primary care physician regarding your recent hospitalization     [facility]- ophthalmology as outpatient  has appointment.     - Pt with CKD3, follows with Dr Escudero in outpt clinic.   -You are scheduled for Hemodialysis tuesday, thursday and saturday at 10:30am     Thyroid nodule to be followed in one year - RLP hypoechoic nodule 13mm and LLP isoechoic nodule 1.9 cm. Both benign according to pt in the past but would need records. Can follow up outpatient with your Please call to make an appointment to follow up with your primary care physician regarding your recent hospitalization     [facility]- ophthalmology as outpatient  has appointment.     - Pt with CKD3, follows with Dr Escudero in outpatient clinic.   -You are scheduled for Hemodialysis tuesday, thursday and saturday at 10:30am     Thyroid nodule to be followed in one year - RLP hypoechoic nodule 13mm and LLP isoechoic nodule 1.9 cm. Both benign according to pt in the past but would need records. Can follow up outpatient with your Please call to make an appointment to follow up with your primary care physician regarding your recent hospitalization     [facility]- ophthalmology as outpatient  has appointment.     - Pt with CKD3, follows with Dr Escudero in outpatient clinic.   -You are scheduled for Hemodialysis tuesday, thursday and saturday at 10:30am .  On tuesday 5/25 please arrive at 9:30am

## 2021-05-21 NOTE — DISCHARGE NOTE PROVIDER - NSDCFUSCHEDAPPT_GEN_ALL_CORE_FT
YON WARREN P ; 07/30/2021 ; [facility] [address] [address] [address]  YON WARREN P ; 07/30/2021 ; [facility] [address] [address]  YON WARREN ; 07/30/2021 ; P [address]

## 2021-05-21 NOTE — DISCHARGE NOTE PROVIDER - PROVIDER TOKENS
PROVIDER:[TOKEN:[[id number]]],FREE:[LAST:[nephrology],PHONE:[(   )    -],FAX:[(   )    -],ADDRESS:[f/u with Dr Escudero call for appointment]],PROVIDER:[TOKEN:[[id number]]] PROVIDER:[TOKEN:[[id number]]],PROVIDER:[TOKEN:[[id number]]],PROVIDER:[TOKEN:[[id number]]]

## 2021-05-21 NOTE — PROGRESS NOTE ADULT - PROBLEM SELECTOR PROBLEM 2
Thyroid nodule
Acute on chronic renal insufficiency
Acute on chronic renal insufficiency
Other hyperlipidemia
Acute on chronic renal insufficiency
Other hyperlipidemia
Acute on chronic renal insufficiency
Thyroid nodule
Acute on chronic renal insufficiency
Chest pain, unspecified type
Chest pain, unspecified type
Acute on chronic renal insufficiency
Acute on chronic renal insufficiency

## 2021-05-21 NOTE — PROGRESS NOTE ADULT - ASSESSMENT
71F PMH of DM2 with neuropathy, HTN, CKD, PAD s/p angiogram RLE, HLD, and Thyroid nodules (benign biopsy x2 as per patient) who presents to the hospital with complaints of worsening SOB, LE edema, orthopnea, weight gain, and increasing abdominal girth likely 2/2 CHF exacerbation. Started on bumex gtt. Renal following for RADHA on CKD.       labs, chart reviewed

## 2021-05-21 NOTE — DISCHARGE NOTE NURSING/CASE MANAGEMENT/SOCIAL WORK - NSDCCRNAME_GEN_ALL_CORE_FT
[facility] [address] [place] [address] Tele#[phone] Fax# [phone] dialysis scheduled for Tuesday, Thursday and Saturday at 10:30am. Patient's first appointment set up for Tuesday 5/25 at 9:30. Access a ride [phone], ref# [id number]. Please call for transportation.

## 2021-05-21 NOTE — PROGRESS NOTE ADULT - PROBLEM/PLAN-7
DISPLAY PLAN FREE TEXT
no
DISPLAY PLAN FREE TEXT

## 2021-05-21 NOTE — DISCHARGE NOTE PROVIDER - HOSPITAL COURSE
71 y.o. Female w/ hx HTN, DM2 with neuropathy, Recently diagnosed CKD, PAD s/p angiogram RLE (pt denies any stents, just "clearing out"), HLD, and Thyroid nodules (benign biopsy x2 as per patient) p/w worsening SOB, LE edema, orthopnea, weight gain, and increasing abdominal girth likely 2/2 CHF exacerbation. Also with complaints of new intermittent double vision (occasionally when watching TV, does not occur otherwise) and new dysphagia intermittently. Started on diuresis, eventually placed on bumex drip. Cardio and nephro on board. Cr worsening and not improving. Decision made to start pt on HD. Cedric placed on 5/13. Started on HD, now s/p permacath 5/20 awaiting outpt HD placement.        Acute on chronic congestive heart failure,  - s/p RHC 5/4 c/w severe pulm HTN  - TTE normal LVEF  - S/p metolazone x2  - continue hydralazine 25mg tid, isordil 10 tid, Toprol 100 mg po BID. (BP meds being held on HD days to optimize fluid removal) ????????????????????  - 1.5L fluid restriction,  - Cont HD for volume optimization.   -Heart failure following   - Transitioned to PO Bumex     Acute on chronic renal insufficiency.    - Initiated on HD for volume overload   - S/p Cedric on 5/13.  -s/p permacath 5/20 with IR   - Outpt f/u for AVF placement, if needed follow up with nephrology     Thrombocytopenia.     - Stable, no signs of active bleeding.     Double vision.     - intermittent double vision over the past month, intermittent, currently resolved  - CT Head with no acute intracranial abnormalities  - f/u outpatient with Opthalmology, has appointment.      Dysphagia, unspecified type.    new onset dysphagia, noted over the past few weeks, mostly to large pills, occasionally to food  - speech and swallow evaluation - tolerated regular and thin.     Type 2 diabetes mellitus with diabetic polyneuropathy, with long-term current use of insulin.   endocrine consulted  -Continue Lantus 55 units qhs   -Continue Admelog 21 units tidac, hold if not eating  -Counselled patient on carb consistent diet to avoid eating a meal with no carb component while on insulin regimen which would likely lead to glucose fluctuations.  -Continue Admelog moderate dose correction scale tidac and moderate qhs scale    Discharge   Basal and bolus insulin pens assessed for cost and the cost is too high for the patient.  Will therefore recommend for discharge - Novolin 70/30 vial (from [employer]) with syringes, final doses to be determined before discharge. ?????????????????????????????????????????  As of now tentative dosing would be 60 units before breakfast and 30 units before dinner??????????????????????????????????????????????????  Patient was previously following with endocrinologist Dr. Sahu and will resume follow up after discharge.      HTN   Continue isordil, metoprolol, hydralazine.     Discussed with attending ready for discharge home          71 y.o. Female w/ hx HTN, DM2 with neuropathy, Recently diagnosed CKD, PAD s/p angiogram RLE (pt denies any stents, just "clearing out"), HLD, and Thyroid nodules (benign biopsy x2 as per patient) p/w worsening SOB, LE edema, orthopnea, weight gain, and increasing abdominal girth likely 2/2 CHF exacerbation. Also with complaints of new intermittent double vision (occasionally when watching TV, does not occur otherwise) and new dysphagia intermittently. Started on diuresis, eventually placed on bumex drip. Cardio and nephro on board. Cr worsening and not improving. Decision made to start pt on HD. Shiteri placed on 5/13. Started on HD, now s/p permacath 5/20 awaiting outpt HD placement.        Acute on chronic congestive heart failure,  - s/p RHC 5/4 c/w severe pulm HTN  - TTE normal LVEF  - S/p metolazone x2  - continue hydralazine 25mg tid, isordil 10 tid, Toprol 100 mg po BID. (BP meds being held on HD days to optimize fluid removal) ????????????????????  - 1.5L fluid restriction,  - Cont HD for volume optimization.   -Heart failure following   - Transitioned to PO Bumex     Acute on chronic renal insufficiency.    - Initiated on HD for volume overload   - S/p Malialey on 5/13.  -s/p permacath 5/20 with IR   - Outpt f/u for AVF placement, if needed follow up with nephrology     Thrombocytopenia.     - Stable, no signs of active bleeding.     Double vision.     - intermittent double vision over the past month, intermittent, currently resolved  - CT Head with no acute intracranial abnormalities  - f/u outpatient with Opthalmology, has appointment.      Dysphagia, unspecified type.    new onset dysphagia, noted over the past few weeks, mostly to large pills, occasionally to food  - speech and swallow evaluation - tolerated regular and thin.     Type 2 diabetes mellitus with diabetic polyneuropathy, with long-term current use of insulin.   endocrine consulted  -Continue Lantus  qhs   -Continue Admelog tidac, hold if not eating  -Counseled patient on carb consistent diet to avoid eating a meal with no carb component while on insulin regimen which would likely lead to glucose fluctuations.  -Continue Admelog moderate dose correction scale tidac and moderate qhs scale    Discharge   Basal and bolus insulin pens assessed for cost and the cost is too high for the patient.  Will therefore recommend for discharge - Novolin 70/30 vial (from [employer]) with syringes, final doses to be determined before discharge. ?????????????????????????????????????????  As of now tentative dosing would be 40 units before breakfast and 20 units before dinner??????????????????????????????????????????????????  Patient was previously following with endocrinologist Dr. Sahu and will resume follow up after discharge.      HTN   Continue isordil, metoprolol, hydralazine.     Discussed with attending ready for discharge home          71 y.o. Female w/ hx HTN, DM2 with neuropathy, Recently diagnosed CKD, PAD s/p angiogram RLE (pt denies any stents, just "clearing out"), HLD, and Thyroid nodules (benign biopsy x2 as per patient) p/w worsening SOB, LE edema, orthopnea, weight gain, and increasing abdominal girth likely 2/2 CHF exacerbation. Also with complaints of new intermittent double vision (occasionally when watching TV, does not occur otherwise) and new dysphagia intermittently. Started on diuresis, eventually placed on bumex drip. Cardio and nephro on board. Cr worsening and not improving. Decision made to start pt on HD. Shiley placed on 5/13. Started on HD, now s/p permacath 5/20 awaiting outpt HD placement.        Acute on chronic congestive heart failure,  - s/p RHC 5/4 c/w severe pulm HTN  - TTE normal LVEF  - S/p metolazone x2  - continue hydralazine 25mg tid, isordil 10 tid, Toprol 100 mg po BID. BP meds being held on HD days   - 1.5L fluid restriction,  - Cont HD for volume optimization.   -Heart failure following   - Transitioned to PO Bumex     Acute on chronic renal insufficiency.    - Initiated on HD for volume overload   - S/p Shiley on 5/13.  -s/p permacath 5/20 with IR   - Outpt f/u for AVF placement, if needed follow up with nephrology     Thrombocytopenia.     - Stable, no signs of active bleeding.     Double vision.     - intermittent double vision over the past month, intermittent, currently resolved  - CT Head with no acute intracranial abnormalities  - f/u outpatient with Opthalmology, has appointment.      Dysphagia, unspecified type.    new onset dysphagia, noted over the past few weeks, mostly to large pills, occasionally to food  - speech and swallow evaluation - tolerated regular and thin.     Type 2 diabetes mellitus with diabetic polyneuropathy, with long-term current use of insulin.   endocrine consulted  -Continue Lantus  qhs   -Continue Admelog tidac, hold if not eating  -Counseled patient on carb consistent diet to avoid eating a meal with no carb component while on insulin regimen which would likely lead to glucose fluctuations.  -Continue Admelog moderate dose correction scale tidac and moderate qhs scale    Discharge   Basal and bolus insulin pens assessed for cost and the cost is too high for the patient.  Will therefore recommend for discharge - Novolin 70/30 vial (from [facility]) with syringes, final doses to be determined before discharge. ?????????????????????????????????????????  As of now tentative dosing would be 40 units before breakfast and 20 units before dinner??????????????????????????????????????????????????  Patient was previously following with endocrinologist Dr. Sahu and will resume follow up after discharge.      HTN   Continue isordil, metoprolol, hydralazine.     Discussed with attending ready for discharge home          71 y.o. Female w/ hx HTN, DM2 with neuropathy, Recently diagnosed CKD, PAD s/p angiogram RLE (pt denies any stents, just "clearing out"), HLD, and Thyroid nodules (benign biopsy x2 as per patient) p/w worsening SOB, LE edema, orthopnea, weight gain, and increasing abdominal girth likely 2/2 CHF exacerbation. Also with complaints of new intermittent double vision (occasionally when watching TV, does not occur otherwise) and new dysphagia intermittently. Started on diuresis, eventually placed on bumex drip. Cardio and nephro on board. Cr worsening and not improving. Decision made to start pt on HD. Shiley placed on 5/13. Started on HD, now s/p permacath 5/20 awaiting outpt HD placement.        Acute on chronic congestive heart failure,  - s/p RHC 5/4 c/w severe pulm HTN  - TTE normal LVEF  - S/p metolazone x2  - continue hydralazine 25mg tid, isordil 10 tid, Toprol 100 mg po BID. BP meds being held on HD days   - 1.5L fluid restriction,  - Cont HD for volume optimization.   -Heart failure following   - Transitioned to PO Bumex     Acute on chronic renal insufficiency.    - Initiated on HD for volume overload   - S/p Shiley on 5/13.  -s/p permacath 5/20 with IR   - Outpt f/u for AVF placement, if needed follow up with nephrology     Thrombocytopenia.     - Stable, no signs of active bleeding.     Double vision.     - intermittent double vision over the past month, intermittent, currently resolved  - CT Head with no acute intracranial abnormalities  - f/u outpatient with Opthalmology, has appointment.      Dysphagia, unspecified type.    new onset dysphagia, noted over the past few weeks, mostly to large pills, occasionally to food  - speech and swallow evaluation - tolerated regular and thin.     Type 2 diabetes mellitus with diabetic polyneuropathy, with long-term current use of insulin.   endocrine consulted  -Continue Lantus  qhs   -Continue Admelog tidac, hold if not eating  -Counseled patient on carb consistent diet to avoid eating a meal with no carb component while on insulin regimen which would likely lead to glucose fluctuations.  -Continue Admelog moderate dose correction scale tidac and moderate qhs scale    Discharge   Basal and bolus insulin pens assessed for cost and the cost is too high for the patient.  Will therefore recommend for discharge - Novolin 70/30 vial (from [email or web address]) with syringes,   dosing would be 40 units before breakfast and 20 units before dinner  Patient was previously following with endocrinologist Dr. aShu and will resume follow up after discharge.      HTN   Continue isordil, metoprolol, hydralazine.     Discussed with attending ready for discharge home

## 2021-05-21 NOTE — DISCHARGE NOTE PROVIDER - CARE PROVIDER_API CALL
Alexis Sahu A  INTERNAL MEDICINE  [address] [place] [address]  Phone: [phone]  Fax: [phone]  Follow Up Time:     nephrology,   f/u with Dr Escudero call for appointment  Phone: (   )    -  Fax: (   )    -  Follow Up Time:     Lowell Hu)  Adv Heart Fail [facility] Cardio  [address] [place] [address]  Phone: [phone]  Fax: [phone]  Follow Up Time:    Alexis Sahu  INTERNAL MEDICINE  [address] [place] [address]  Phone: [phone]  Fax: [phone]  Follow Up Time:     Lowell Hu)  Adv [facility] Fail [facility] Cardio  [address] [place] [address]  Phone: [phone]  Fax: [phone]  Follow Up Time:     Ben Escudero  INTERNAL MEDICINE  [address] [place] [address]  Phone: [phone]  Fax: [phone]  Follow Up Time:

## 2021-05-21 NOTE — DISCHARGE NOTE PROVIDER - CARE PROVIDERS DIRECT ADDRESSES
[email or web address]

## 2021-05-21 NOTE — PROGRESS NOTE ADULT - SUBJECTIVE AND OBJECTIVE BOX
[facility] Division of Hospital Medicine  Arely Washington MD  Pager [phone]    Patient is a 71y old  Female who presents with a chief complaint of Worsening SOB       SUBJECTIVE / OVERNIGHT EVENTS: sitting in chiar upon my arrival; reports breathing in better; explained to pt about HD on dc      MEDICATIONS  (STANDING):  allopurinol 100 milliGRAM(s) Oral daily  aspirin enteric coated 81 milliGRAM(s) Oral daily  atorvastatin 80 milliGRAM(s) Oral at bedtime  buMETAnide 2 milliGRAM(s) Oral every 12 hours  chlorhexidine 2% Cloths 1 Application(s) Topical daily  chlorhexidine 4% Liquid 1 Application(s) Topical <User Schedule>  dextrose 40% Gel 15 Gram(s) Oral once  dextrose 5%. 1000 milliLiter(s) (50 mL/Hr) IV Continuous <Continuous>  dextrose 5%. 1000 milliLiter(s) (100 mL/Hr) IV Continuous <Continuous>  dextrose 50% Injectable 25 Gram(s) IV Push once  dextrose 50% Injectable 12.5 Gram(s) IV Push once  dextrose 50% Injectable 25 Gram(s) IV Push once  gabapentin 300 milliGRAM(s) Oral three times a day  glucagon  Injectable 1 milliGRAM(s) IntraMuscular once  hydrALAZINE 25 milliGRAM(s) Oral three times a day  insulin glargine Injectable (LANTUS) 40 Unit(s) SubCutaneous at bedtime  insulin lispro (ADMELOG) corrective regimen sliding scale   SubCutaneous three times a day before meals  insulin lispro (ADMELOG) corrective regimen sliding scale   SubCutaneous at bedtime  insulin lispro Injectable (ADMELOG) 10 Unit(s) SubCutaneous three times a day before meals  isosorbide   dinitrate Tablet (ISORDIL) 10 milliGRAM(s) Oral three times a day  metoprolol succinate  milliGRAM(s) Oral two times a day  multivitamin 1 Tablet(s) Oral daily  senna 2 Tablet(s) Oral at bedtime    MEDICATIONS  (PRN):  acetaminophen   Tablet .. 650 milliGRAM(s) Oral every 6 hours PRN Temp greater or equal to 38C (100.4F), Mild Pain (1 - 3), Moderate Pain (4 - 6)  ketotifen 0.025% Ophthalmic Solution 1 Drop(s) Both EYES two times a day PRN Allergic Conjunctivitis  sodium chloride 0.9% lock flush 10 milliLiter(s) IV Push every 1 hour PRN Pre/post blood products, medications, blood draw, and to maintain line patency      CAPILLARY BLOOD GLUCOSE  POCT Blood Glucose.: 169 mg/dL (21 May 2021 12:11)  POCT Blood Glucose.: 176 mg/dL (21 May 2021 08:19)  POCT Blood Glucose.: 198 mg/dL (20 May 2021 21:55)  POCT Blood Glucose.: 168 mg/dL (20 May 2021 17:41)        PHYSICAL EXAM:  Vital Signs Last 24 Hrs  T(F): 98.2 (21 May 2021 08:30), Max: 98.7 (20 May 2021 20:49)  HR: 61 (21 May 2021 12:50) (61 - 85)  BP: 136/71 (21 May 2021 12:50) (102/55 - 138/73)  RR: 16 (21 May 2021 12:50) (16 - 18)  SpO2: 100% (21 May 2021 12:50) (98% - 100%)    CONSTITUTIONAL: NAD, obese  RESPIRATORY: Normal respiratory effort; b/l AE grossly clear  CARDIOVASCULAR: Regular rate and rhythm; No lower extremity edema;   ABDOMEN: Nontender to palpation, normoactive bowel sounds  MUSCULOSKELETAL:  no joint swelling or tenderness to palpation  PSYCH: affect appropriate  NEUROLOGY: no gross sensory deficits   SKIN: R chest permacath    LABS:                        9.6    6.91  )-----------( 125      ( 21 May 2021 04:35 )             30.0     05-21    131<L>  |  92<L>  |  46<H>  ----------------------------<  154<H>  4.3   |  25  |  3.99<H>    Ca    9.9      21 May 2021 04:35  Phos  5.0     05-21  Mg     2.1     05-21

## 2021-05-21 NOTE — PROGRESS NOTE ADULT - PROBLEM SELECTOR PLAN 2
- Initiated on HD for volume overload   - S/p Cedric on 5/13.  - Diuresis as above. HD as per nephro  - Pt s/p permacath 5/20  - Outpt f/u for AVF placement (if needed)

## 2021-05-21 NOTE — DISCHARGE NOTE NURSING/CASE MANAGEMENT/SOCIAL WORK - PATIENT PORTAL LINK FT
You can access the FollowMyHealth Patient Portal offered by [facility] by registering at the following website: [email or web address]. By joining [employer]’s FollowMyHealth portal, you will also be able to view your health information using other applications (apps) compatible with our system.

## 2021-05-21 NOTE — PROGRESS NOTE ADULT - SUBJECTIVE AND OBJECTIVE BOX
[facility] - S Collette / Donna S /D Aroldo/ S Elpidio/ ROSALIA Morocho/ Ben Escudero / URIEL Groveu/ O Dominic  service -[phone], office [phone]  ---------------------------------------------------------------------------------------------------------------    Patient seen and examined bedside    Subjective and Objective: No overnight events, sob resolved. No complaints today. feeling better    Allergies: No Known Allergies      Hospital Medications:   MEDICATIONS  (STANDING):  allopurinol 100 milliGRAM(s) Oral daily  aspirin enteric coated 81 milliGRAM(s) Oral daily  atorvastatin 80 milliGRAM(s) Oral at bedtime  buMETAnide 2 milliGRAM(s) Oral every 12 hours  chlorhexidine 2% Cloths 1 Application(s) Topical daily  chlorhexidine 4% Liquid 1 Application(s) Topical <User Schedule>  dextrose 40% Gel 15 Gram(s) Oral once  dextrose 5%. 1000 milliLiter(s) (50 mL/Hr) IV Continuous <Continuous>  dextrose 5%. 1000 milliLiter(s) (100 mL/Hr) IV Continuous <Continuous>  dextrose 50% Injectable 25 Gram(s) IV Push once  dextrose 50% Injectable 12.5 Gram(s) IV Push once  dextrose 50% Injectable 25 Gram(s) IV Push once  gabapentin 300 milliGRAM(s) Oral three times a day  glucagon  Injectable 1 milliGRAM(s) IntraMuscular once  hydrALAZINE 25 milliGRAM(s) Oral three times a day  insulin glargine Injectable (LANTUS) 40 Unit(s) SubCutaneous at bedtime  insulin lispro (ADMELOG) corrective regimen sliding scale   SubCutaneous three times a day before meals  insulin lispro (ADMELOG) corrective regimen sliding scale   SubCutaneous at bedtime  insulin lispro Injectable (ADMELOG) 10 Unit(s) SubCutaneous three times a day before meals  isosorbide   dinitrate Tablet (ISORDIL) 10 milliGRAM(s) Oral three times a day  metoprolol succinate  milliGRAM(s) Oral two times a day  multivitamin 1 Tablet(s) Oral daily  senna 2 Tablet(s) Oral at bedtime      REVIEW OF SYSTEMS:  CONSTITUTIONAL: No weakness, fevers or chills  EYES/ENT: No visual changes;  No vertigo or throat pain   NECK: No pain or stiffness  RESPIRATORY: No cough, wheezing, hemoptysis; No shortness of breath  CARDIOVASCULAR: No chest pain or palpitations.  GASTROINTESTINAL: No abdominal or epigastric pain. No nausea, vomiting, or hematemesis; No diarrhea or constipation. No melena or hematochezia.  GENITOURINARY: No dysuria, frequency, foamy urine, urinary urgency, incontinence or hematuria  NEUROLOGICAL: No numbness or weakness  SKIN: No itching, burning, rashes, or lesions   VASCULAR: No bilateral lower extremity edema.   All other review of systems is negative unless indicated above.    VITALS:  T(F): 98.2 (05-21-21 @ 08:30), Max: 98.7 (05-20-21 @ 20:49)  HR: 61 (05-21-21 @ 12:50)  BP: 136/71 (05-21-21 @ 12:50)  RR: 16 (05-21-21 @ 12:50)  SpO2: 100% (05-21-21 @ 12:50)  Wt(kg): --    05-20 @ 07:01  -  05-21 @ 07:00  --------------------------------------------------------  IN: 400 mL / OUT: 1900 mL / NET: -1500 mL    05-21 @ 07:01  -  05-21 @ 17:14  --------------------------------------------------------  IN: 480 mL / OUT: 0 mL / NET: 480 mL          PHYSICAL EXAM:  Constitutional: NAD  HEENT: anicteric sclera, oropharynx clear  Neck: No JVD  Respiratory: CTAB, no wheezes, rales or rhonchi  Cardiovascular: S1, S2, RRR  Gastrointestinal: BS+, soft, NT/ND  Extremities: No cyanosis or clubbing. No peripheral edema  Neurological: A/O x 3, no focal deficits  Psychiatric: Normal mood, normal affect  : No CVA tenderness. No paredes.   Skin: No rashes  Vascular Access:    LABS:  05-21    131<L>  |  92<L>  |  46<H>  ----------------------------<  154<H>  4.3   |  25  |  3.99<H>    Ca    9.9      21 May 2021 04:35  Phos  5.0     05-21  Mg     2.1     05-21      Creatinine Trend: 3.99 <--, 4.54 <--, 4.02 <--, 3.50 <--, 4.72 <--, 3.75 <--, 3.84 <--                        9.6    6.91  )-----------( 125      ( 21 May 2021 04:35 )             30.0     Urine Studies:        RADIOLOGY & ADDITIONAL STUDIES:   [facility] - S Collette / Donna S /D Aroldo/ S Elpidio/ ROSALIA Morocho/ Ben Escudero / URIEL Groveu/ O Dominic  service -[phone], office [phone]  ---------------------------------------------------------------------------------------------------------------    Patient seen and examined bedside    Subjective and Objective: No overnight events, denied sob. No complaints today.     Allergies: No Known Allergies      Hospital Medications:   MEDICATIONS  (STANDING):  allopurinol 100 milliGRAM(s) Oral daily  aspirin enteric coated 81 milliGRAM(s) Oral daily  atorvastatin 80 milliGRAM(s) Oral at bedtime  buMETAnide 2 milliGRAM(s) Oral every 12 hours  chlorhexidine 2% Cloths 1 Application(s) Topical daily  chlorhexidine 4% Liquid 1 Application(s) Topical <User Schedule>  dextrose 40% Gel 15 Gram(s) Oral once  dextrose 5%. 1000 milliLiter(s) (50 mL/Hr) IV Continuous <Continuous>  dextrose 5%. 1000 milliLiter(s) (100 mL/Hr) IV Continuous <Continuous>  dextrose 50% Injectable 25 Gram(s) IV Push once  dextrose 50% Injectable 12.5 Gram(s) IV Push once  dextrose 50% Injectable 25 Gram(s) IV Push once  gabapentin 300 milliGRAM(s) Oral three times a day  glucagon  Injectable 1 milliGRAM(s) IntraMuscular once  hydrALAZINE 25 milliGRAM(s) Oral three times a day  insulin glargine Injectable (LANTUS) 40 Unit(s) SubCutaneous at bedtime  insulin lispro (ADMELOG) corrective regimen sliding scale   SubCutaneous three times a day before meals  insulin lispro (ADMELOG) corrective regimen sliding scale   SubCutaneous at bedtime  insulin lispro Injectable (ADMELOG) 10 Unit(s) SubCutaneous three times a day before meals  isosorbide   dinitrate Tablet (ISORDIL) 10 milliGRAM(s) Oral three times a day  metoprolol succinate  milliGRAM(s) Oral two times a day  multivitamin 1 Tablet(s) Oral daily  senna 2 Tablet(s) Oral at bedtime      VITALS:  T(F): 98.2 (05-21-21 @ 08:30), Max: 98.7 (05-20-21 @ 20:49)  HR: 61 (05-21-21 @ 12:50)  BP: 136/71 (05-21-21 @ 12:50)  RR: 16 (05-21-21 @ 12:50)  SpO2: 100% (05-21-21 @ 12:50)  Wt(kg): --    05-20 @ 07:01  -  05-21 @ 07:00  --------------------------------------------------------  IN: 400 mL / OUT: 1900 mL / NET: -1500 mL    05-21 @ 07:01  -  05-21 @ 17:14  --------------------------------------------------------  IN: 480 mL / OUT: 0 mL / NET: 480 mL    PHYSICAL EXAM:  Constitutional: NAD  HEENT: anicteric sclera  Neck: No JVD  Respiratory: CTAB, no wheezes, rales or rhonchi  Cardiovascular: S1, S2, RRR  Gastrointestinal: BS+, soft, NT/ND  Extremities: + peripheral edema  Neurological: A/O x 3  Psychiatric: Normal mood, normal affect  : No paredes.   Vascular Access: rt IJ PC+    LABS:  05-21    131<L>  |  92<L>  |  46<H>  ----------------------------<  154<H>  4.3   |  25  |  3.99<H>    Ca    9.9      21 May 2021 04:35  Phos  5.0     05-21  Mg     2.1     05-21      Creatinine Trend: 3.99 <--, 4.54 <--, 4.02 <--, 3.50 <--, 4.72 <--, 3.75 <--, 3.84 <--                        9.6    6.91  )-----------( 125      ( 21 May 2021 04:35 )             30.0     Urine Studies:        RADIOLOGY & ADDITIONAL STUDIES:

## 2021-05-21 NOTE — PROGRESS NOTE ADULT - ASSESSMENT
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 Endocrine consulted for uncontrolled DM2 with hyperglycemia inpatient.    1. Type 2 diabetes mellitus with hyperglycemia, with long-term current use of insulin  T2DM with hgba1c of 6.8% with FS tightly controlled at home but on unconventional regimen of BID NPH and regular insulin BID    Inpatient BG target 100-180 mg/dl  Within target today, no hypoglycemia   Continue Lantus 40 units qhs   Continue Admelog 10 units SQ TID before meals (Hold if NPO/not eating meal)   Continue Admelog moderate dose correction scale TID before meals and moderate bedtime scale  Check BG  before meals and bedtime  Consistent carbohydrate diet    Discharge Plan:   Basal and bolus insulin pens assessed for cost and the cost is too high for the patient.  Will therefore recommend for discharge - Novolin 70/30 vial (from "[employer] with syringes  TDD of insulin in last 24h = 76 units, 20% reduction for 70/30 recommended = 60 units total  Tentative discharge dosing would be: Novolin 70/30: 40 units before breakfast and 20 units before dinner  Patient was previously following with endocrinologist Dr. Sahu and will resume follow up after discharge.    2. Thyroid nodule  RLP hypoechoic nodule 13mm and LLP isoechoic nodule 1.9 cm. Both benign according to pt in the past but would need records. Can follow up outpatient.    3. Essential hypertension  BP goal <130/80  Management per primary team    4. Other hyperlipidemia  LDL 72 on Lipitor     Jo Harrison  Nurse Practitioner  Division of Endocrinology & Diabetes  In house pager #[phone]/long range pager #[phone]    If before 9AM or after 6PM, or on weekends/holidays, please call endocrine answering service for assistance ([phone]).  For nonurgent matters email [email or web address] for assistance.

## 2021-05-21 NOTE — PROGRESS NOTE ADULT - ASSESSMENT
71 y/p female with PMHX of HTN, DM II, HLD, PAD s/p RLE angiogram, CKD comes in with complaints of worsening SOB, LE edema, abdominal distension.   CXR shows clear lungs, LE dopplers show no DVT, CT head unremarkable. Labs significant for BUN/Cr 46/2.43, K 5.2, proBNP 303. She states she was recently told she had a weak heart and has been placed on oral diuretics, but with minimal relief. She admits to 4 pillow orthopnea, BREEN w/ minimal exertion. No PND, CP, palpitations, dizziness. She has had a stress test with her cardiologist Dr. Alas a few months ago, but unclear of the results. Admits to drinking 1 pint of vodka most weekends. Non smoker, no other drug use.    5/4/21 RHC: RA 20; PA systolic 69, PAD 21, PA mean 44, PCWP 25-30 with respiratory variation, Hemoglobin 9.9, PA sat 70's, CO 8.4/ CI 4- volume overloaded    Improving volume status. Awaiting decision regarding long term HD.    Acute diastolic heart fialure     - Euvolemic on exam  - JVD not elevated   - s/p HD 5/20, removed to 1.5L  - Bumex 2mg PO BID  - Continue Isordil 10 tid, Toprol 100 mg po BID, hydralazine 25mg PO TID   - Keep k 4.0-5.0 and mag 2.0.   - Daily standing weights and strict I/O.   - HD tunnel cathter placed 5/21

## 2021-05-21 NOTE — PROGRESS NOTE ADULT - PROBLEM SELECTOR PLAN 1
Pt with CKD3, follows with Dr Escudero in outpt clinic.   Baseline Cr ~2 from earlier this year  Worsening renal failure, requiring HD start.   shiley cath changed to tunneled cath 5/20  s/p Isolated UF 5/19 and HD yesterday with net UF 2kg, 1.5kg achieved respectively  Euvolemic, as per HF notes.   SW note reviewed-pt accepted to [facility] unit- scheduled for Tuesday, Thursday  and Saturday at 10:30am. first appointment set up for Tuesday 5/25  plan for next hd tomorrow w/2k bath, uf 2kg as tolearted  pt may be d/c home after HD tomorrow  dose all meds for eGFR<15ml/min.   avoid ACEi/ARB/NSAIDs/Nephrotoxics.

## 2021-05-21 NOTE — PROGRESS NOTE ADULT - SUBJECTIVE AND OBJECTIVE BOX
Chief Complaint: DM 2    History: Patient seen at bedside. Reports she is feeling better, eating meals, denies n/v, denies s/s of hypoglycemia. Most recent BG stable at 169 mg/dl    Reviewed outpatient planning for diabetes  Patient reports she was previously using vial and syringe - NPH BID and regular insulin due to cost concerns. Taking regular insulin 2x per day - breakfast and bedtime (?) and having frequent hypoglycemia  Costs were assessed for basal/bolus insulin pens and not financially feasible. Patient reports she feels comfortable with vial and syringe use. Is agreeable to switching to Novolin 70/30 BID as outpatient  Reviewed action of 70/30 insulin, when to take, importance eating 3 meals per day, BG monitoring and BG targets, hypoglycemia recognition and treatment and importance of followup. Patient reports understanding  Per primary team, possible dc tomorrow    MEDICATIONS  (STANDING):  allopurinol 100 milliGRAM(s) Oral daily  aspirin enteric coated 81 milliGRAM(s) Oral daily  atorvastatin 80 milliGRAM(s) Oral at bedtime  buMETAnide 2 milliGRAM(s) Oral every 12 hours  chlorhexidine 2% Cloths 1 Application(s) Topical daily  chlorhexidine 4% Liquid 1 Application(s) Topical <User Schedule>  dextrose 40% Gel 15 Gram(s) Oral once  dextrose 5%. 1000 milliLiter(s) (50 mL/Hr) IV Continuous <Continuous>  dextrose 5%. 1000 milliLiter(s) (100 mL/Hr) IV Continuous <Continuous>  dextrose 50% Injectable 25 Gram(s) IV Push once  dextrose 50% Injectable 12.5 Gram(s) IV Push once  dextrose 50% Injectable 25 Gram(s) IV Push once  gabapentin 300 milliGRAM(s) Oral three times a day  glucagon  Injectable 1 milliGRAM(s) IntraMuscular once  hydrALAZINE 25 milliGRAM(s) Oral three times a day  insulin glargine Injectable (LANTUS) 40 Unit(s) SubCutaneous at bedtime  insulin lispro (ADMELOG) corrective regimen sliding scale   SubCutaneous three times a day before meals  insulin lispro (ADMELOG) corrective regimen sliding scale   SubCutaneous at bedtime  insulin lispro Injectable (ADMELOG) 10 Unit(s) SubCutaneous three times a day before meals  isosorbide   dinitrate Tablet (ISORDIL) 10 milliGRAM(s) Oral three times a day  metoprolol succinate  milliGRAM(s) Oral two times a day  multivitamin 1 Tablet(s) Oral daily  senna 2 Tablet(s) Oral at bedtime    MEDICATIONS  (PRN):  acetaminophen   Tablet .. 650 milliGRAM(s) Oral every 6 hours PRN Temp greater or equal to 38C (100.4F), Mild Pain (1 - 3), Moderate Pain (4 - 6)  ketotifen 0.025% Ophthalmic Solution 1 Drop(s) Both EYES two times a day PRN Allergic Conjunctivitis  sodium chloride 0.9% lock flush 10 milliLiter(s) IV Push every 1 hour PRN Pre/post blood products, medications, blood draw, and to maintain line patency    No Known Allergies    Review of Systems:  Cardiovascular: No chest pain  Respiratory: No SOB  GI: No nausea, vomiting  Endocrine: no hypoglycemia     PHYSICAL EXAM:  VITALS: T(C): 36.8 (05-21-21 @ 08:30)  T(F): 98.2 (05-21-21 @ 08:30), Max: 98.7 (05-20-21 @ 20:49)  HR: 61 (05-21-21 @ 12:50) (60 - 85)  BP: 136/71 (05-21-21 @ 12:50) (102/55 - 138/73)  RR:  (16 - 18)  SpO2:  (98% - 100%)  Wt(kg): --  GENERAL: NAD  EYES: No proptosis, no lid lag, anicteric  HEENT:  Atraumatic, Normocephalic, moist mucous membranes  RESPIRATORY: unlabored respirations   PSYCH: Alert and oriented x 3, normal affect, normal mood    CAPILLARY BLOOD GLUCOSE    POCT Blood Glucose.: 169 mg/dL (21 May 2021 12:11)  POCT Blood Glucose.: 176 mg/dL (21 May 2021 08:19)  POCT Blood Glucose.: 198 mg/dL (20 May 2021 21:55)  POCT Blood Glucose.: 168 mg/dL (20 May 2021 17:41)  POCT Blood Glucose.: 237 mg/dL (20 May 2021 15:20)      05-21    131<L>  |  92<L>  |  46<H>  ----------------------------<  154<H>  4.3   |  25  |  3.99<H>    EGFR if : 12<L>  EGFR if non : 11<L>    Ca    9.9      05-21  Mg     2.1     05-21  Phos  5.0     05-21    Thyroid Function Tests:  04-30 @ 07:41 TSH 2.19 FreeT4 -- T3 -- Anti TPO -- Anti Thyroglobulin Ab -- TSI --    A1C with Estimated Average Glucose Result: 6.8 % (04-30-21 @ 07:41)    Diet, Regular:   Consistent Carbohydrate No Snacks (CSTCHO)  DASH/TLC Sodium & Cholesterol Restricted (DASH)  1500mL Fluid Restriction ([id number])  For patients receiving Renal Replacement - No Protein Restr, No Conc K, No Conc Phos, Low Sodium (RENAL)  No Dairy (05-14-21 @ 16:52)

## 2021-05-21 NOTE — PROGRESS NOTE ADULT - SUBJECTIVE AND OBJECTIVE BOX
Subjective: No complains this morning.     Medications:  acetaminophen   Tablet .. 650 milliGRAM(s) Oral every 6 hours PRN  allopurinol 100 milliGRAM(s) Oral daily  aspirin enteric coated 81 milliGRAM(s) Oral daily  atorvastatin 80 milliGRAM(s) Oral at bedtime  buMETAnide 2 milliGRAM(s) Oral every 12 hours  gabapentin 300 milliGRAM(s) Oral three times a day  glucagon  Injectable 1 milliGRAM(s) IntraMuscular once  hydrALAZINE 25 milliGRAM(s) Oral three times a day  insulin glargine Injectable (LANTUS) 40 Unit(s) SubCutaneous at bedtime  insulin lispro (ADMELOG) corrective regimen sliding scale   SubCutaneous three times a day before meals  insulin lispro (ADMELOG) corrective regimen sliding scale   SubCutaneous at bedtime  insulin lispro Injectable (ADMELOG) 10 Unit(s) SubCutaneous three times a day before meals  isosorbide   dinitrate Tablet (ISORDIL) 10 milliGRAM(s) Oral three times a day  ketotifen 0.025% Ophthalmic Solution 1 Drop(s) Both EYES two times a day PRN  metoprolol succinate  milliGRAM(s) Oral two times a day  multivitamin 1 Tablet(s) Oral daily  senna 2 Tablet(s) Oral at bedtime        Physical Exam:    Vitals:  T(C): 36.8 (21 @ 05:29), Max: 37.1 (21 @ 20:49)  HR: 66 (21 @ 05:29) (60 - 67)  BP: 111/58 (21 @ 05:29) (104/50 - 138/73)  RR: 18 (21 @ 05:29) (16 - 18)  SpO2: 100% (21 @ 05:29) (100% - 100%)    Vital Signs Last 24 Hrs  T(C): 36.8 (21 May 2021 05:29), Max: 37.1 (20 May 2021 20:49)  T(F): 98.3 (21 May 2021 05:29), Max: 98.7 (20 May 2021 20:49)  HR: 66 (21 May 2021 05:29) (60 - 67)  BP: 111/58 (21 May 2021 05:29) (104/50 - 138/73)  RR: 18 (21 May 2021 05:29) (16 - 18)  SpO2: 100% (21 May 2021 05:29) (100% - 100%)    Daily     Daily Weight in k.5 (20 May 2021 19:14)    I&O's Summary    20 May 2021 07:01  -  21 May 2021 07:00  --------------------------------------------------------  IN: 400 mL / OUT: 1900 mL / NET: -1500 mL        General: No distress. Comfortable.  Neck: Neck supple. JVP not elevated. No masses  Chest: Clear to auscultation bilaterally  CV: Normal S1 and S2. No murmurs, rub, or gallops. Radial pulses normal.  Abdomen: Soft, non-distended, non-tender  Skin: No rashes or skin breakdown  Neurology: Alert and oriented times three. Sensation intact  Psych: Affect normal    Labs:                        9.6    6.91  )-----------( 125      ( 21 May 2021 04:35 )             30.0     05-21    131<L>  |  92<L>  |  46<H>  ----------------------------<  154<H>  4.3   |  25  |  3.99<H>    Ca    9.9      21 May 2021 04:35  Phos  5.0       Mg     2.1     21

## 2021-05-21 NOTE — DISCHARGE NOTE PROVIDER - NSDCMRMEDTOKEN_GEN_ALL_CORE_FT
alcohol swabs : Apply topically to affected area 4 times a day   allopurinol 100 mg oral tablet: 1 tab(s) orally once a day  amLODIPine 5 mg oral tablet: 1 tab(s) orally once a day  Aspirin Enteric Coated 81 mg oral delayed release tablet: 1 tab(s) orally once a day  atorvastatin 80 mg oral tablet: 1 tab(s) orally once a day  Basaglar KwikPen 100 units/mL subcutaneous solution: 55 unit(s) subcutaneous once a day   Coreg 6.25 mg oral tablet: 1 tab(s) orally 2 times a day  gabapentin 300 mg oral tablet: 1 tab(s) orally 3 times a day  glucometer (per patient&#x27;s insurance): Test blood sugars four times a day. Dispense #1 glucometer.  HumaLOG KwikPen 100 units/mL injectable solution: 21 unit(s) subcutaneous 3 times a day (with meals)   hydrALAZINE 100 mg oral tablet: 1 tab(s) orally 3 times a day  Insulin Pen Needles, 4mm: 1 application subcutaneously 4 times a day. ** Use with insulin pen **   lancets: 1 application subcutaneously 4 times a day   NovoLIN N 100 units/mL subcutaneous suspension: 25 unit(s) subcutaneous 2 times a day (10am and 10pm)  NovoLIN R 100 units/mL injectable solution: 5 unit(s) injectable 2 times a day (10am and 10pm)  olopatadine 0.1% ophthalmic solution: 1 drop(s) to both eye 2 times a day  test strips (per patient&#x27;s insurance): 1 application subcutaneously 4 times a day. ** Compatible with patient&#x27;s glucometer **  torsemide 20 mg oral tablet: 1 tab(s) orally every other day  Vitamin B Complex 100: 1 tab(s) orally once a day   alcohol swabs : Apply topically to affected area 4 times a day   allopurinol 100 mg oral tablet: 1 tab(s) orally once a day  amLODIPine 5 mg oral tablet: 1 tab(s) orally once a day  Aspirin Enteric Coated 81 mg oral delayed release tablet: 1 tab(s) orally once a day  atorvastatin 80 mg oral tablet: 1 tab(s) orally once a day  Basaglar KwikPen 100 units/mL subcutaneous solution: 55 unit(s) subcutaneous once a day   Coreg 6.25 mg oral tablet: 1 tab(s) orally 2 times a day  gabapentin 300 mg oral tablet: 1 tab(s) orally 3 times a day  glucometer (per patient&#x27;s insurance): Test blood sugars four times a day. Dispense #1 glucometer.  HumaLOG KwikPen 100 units/mL injectable solution: 21 unit(s) subcutaneous 3 times a day (with meals)   hydrALAZINE 25 mg oral tablet: 1 tab(s) orally 3 times a day  Insulin Pen Needles, 4mm: 1 application subcutaneously 4 times a day. ** Use with insulin pen **   isosorbide dinitrate 10 mg oral tablet: 1 tab(s) orally 3 times a day  lancets: 1 application subcutaneously 4 times a day   NovoLIN N 100 units/mL subcutaneous suspension: 25 unit(s) subcutaneous 2 times a day (10am and 10pm)  NovoLIN R 100 units/mL injectable solution: 5 unit(s) injectable 2 times a day (10am and 10pm)  olopatadine 0.1% ophthalmic solution: 1 drop(s) to both eye 2 times a day  test strips (per patient&#x27;s insurance): 1 application subcutaneously 4 times a day. ** Compatible with patient&#x27;s glucometer **  torsemide 20 mg oral tablet: 1 tab(s) orally every other day  Vitamin B Complex 100: 1 tab(s) orally once a day   alcohol swabs : Apply topically to affected area 4 times a day   allopurinol 100 mg oral tablet: 1 tab(s) orally once a day  Aspirin Enteric Coated 81 mg oral delayed release tablet: 1 tab(s) orally once a day  atorvastatin 80 mg oral tablet: 1 tab(s) orally once a day  bumetanide 2 mg oral tablet: 1 tab(s) orally every 12 hours  gabapentin 300 mg oral tablet: 1 tab(s) orally 3 times a day  glucometer (per patient&#x27;s insurance): Test blood sugars four times a day. Dispense #1 glucometer.  hydrALAZINE 25 mg oral tablet: 1 tab(s) orally 3 times a day  Insulin Pen Needles, 4mm: 1 application subcutaneously 4 times a day. ** Use with insulin pen **   isosorbide dinitrate 10 mg oral tablet: 1 tab(s) orally 3 times a day  lancets: 1 application subcutaneously 4 times a day   metoprolol succinate 100 mg oral tablet, extended release: 1 tab(s) orally 2 times a day  NovoLIN 70/30 subcutaneous suspension: Inject 40  units before breakfast and 20  units before dinner. Do not take if not eating within 30 minutes.  olopatadine 0.1% ophthalmic solution: 1 drop(s) to both eye 2 times a day  senna oral tablet: 2 tab(s) orally once a day (at bedtime)  sevelamer carbonate 800 mg oral tablet: 2 tab(s) orally 3 times a day (with meals)  test strips (per patient&#x27;s insurance): 1 application subcutaneously 4 times a day. ** Compatible with patient&#x27;s glucometer **  U-100 Insulin Syringe, 1/2 mL: 1 application subcutaneously 2 times a day ** 1/2 mL holds up to 50 units of insulin **   Vitamin B Complex 100: 1 tab(s) orally once a day

## 2021-05-21 NOTE — DISCHARGE NOTE PROVIDER - NSDCCPCAREPLAN_GEN_ALL_CORE_FT
PRINCIPAL DISCHARGE DIAGNOSIS  Diagnosis: Dyspnea on exertion  Assessment and Plan of Treatment: Acute on chronic congestive heart failure,  - echo normal LVEF  - continue hydralazine, isordil, Toprol  BP meds being Held. on HD days   - 1.5L fluid restriction  - Transitioned from IV to PO Bumex  -if you develop shortness of breath, body swelling, and or difficulty laying flat call your doctor immediately and seek medical attention.   take your medications as perscribed and follow up with your doctors in 1 week call for appointment      SECONDARY DISCHARGE DIAGNOSES  Diagnosis: Renal insufficiency  Assessment and Plan of Treatment: Acute on chronic renal insufficiency.    - Initiated on HD for volume overload   - S/p Shiley placement by interventional radiology on 5/13.  -s/p permacath by interventional radiology on 5/20   - Outpt f/u for AVF placement, if needed   -follow up with nephrology in 1 week call for appointment.  Please hold your blood pressure medications on HD days.  Follow up with nephrology when to resume them on HD days    Diagnosis: Double vision  Assessment and Plan of Treatment: Double vision.     - intermittent double vision over the past month, intermittent, currently resolved  - CT Head with no acute intracranial abnormalities  - f/u outpatient with Opthalmology, has appointment.    Diagnosis: Diabetes  Assessment and Plan of Treatment: Type 2 diabetes mellitus with diabetic polyneuropathy,   endocrine consulted   recommend for discharge - Novolin 70/30 vial (from [employer]) with syringes,  dosing would be 40 units before breakfast and 20 units before dinner  Patient was previously following with endocrinologist Dr. Sahu and will resume follow up after discharge.    Diagnosis: HTN (hypertension)  Assessment and Plan of Treatment: take your blood pressure medications as perscribed, take your blood pressure at home and document for your doctor.  follow up with your PCP call for appointment    Diagnosis: Thyroid nodule  Assessment and Plan of Treatment: Thyroid nodule to be followed in one year - RLP hypoechoic nodule 13mm and LLP isoechoic nodule 1.9 cm. Both benign according to pt in the past but would need records. Can follow up outpatient call your PCP for follow up

## 2021-05-21 NOTE — PROGRESS NOTE ADULT - PROBLEM SELECTOR PLAN 1
- Acute on chronic HFpEF, initiated on HD   - s/p RHC 5/4 c/w severe pulm HTN  - TTE normal LVEF  - S/p metolazone x2  - continue hydralazine 25mg tid, isordil 10 tid, Toprol 100 mg po BID. (BP meds being held on HD days to optimize fluid removal)   - 1.5L fluid restriction, daily weights, I/O   - Cont HD for volume optimization.   - Appreciate HF recs.  - Transitioned to PO Bumex

## 2021-05-21 NOTE — DISCHARGE NOTE NURSING/CASE MANAGEMENT/SOCIAL WORK - NSDCFUADDAPPT_GEN_ALL_CORE_FT
Thyroid nodule to be followed in one year - RLP hypoechoic nodule 13mm and LLP isoechoic nodule 1.9 cm. Both benign according to pt in the past but would need records. Can follow up outpatient with your Please call to make an appointment to follow up with your primary care physician regarding your recent hospitalization     [facility]- ophthalmology as outpatient  has appointment.     - Pt with CKD3, follows with Dr Escudero in outpatient clinic.   -You are scheduled for Hemodialysis tuesday, thursday and saturday at 10:30am .  On tuesday 5/25 please arrive at 9:30am

## 2021-05-21 NOTE — PROGRESS NOTE ADULT - PROBLEM SELECTOR PLAN 8
DVT ppx - Heparin 5,000u sc tid    Dispo- awaiting outpt HD facility setup    PT eval: outpt PT  SW coord with daughter for transportation for HD; will have T/Th/Sa

## 2021-05-22 VITALS
SYSTOLIC BLOOD PRESSURE: 120 MMHG | HEART RATE: 65 BPM | DIASTOLIC BLOOD PRESSURE: 74 MMHG | TEMPERATURE: 98 F | RESPIRATION RATE: 16 BRPM | OXYGEN SATURATION: 100 %

## 2021-05-22 LAB
ANION GAP SERPL CALC-SCNC: 15 MMOL/L — HIGH (ref 7–14)
BUN SERPL-MCNC: 74 MG/DL — HIGH (ref 7–23)
CALCIUM SERPL-MCNC: 10.2 MG/DL — SIGNIFICANT CHANGE UP (ref 8.4–10.5)
CHLORIDE SERPL-SCNC: 89 MMOL/L — LOW (ref 98–107)
CO2 SERPL-SCNC: 24 MMOL/L — SIGNIFICANT CHANGE UP (ref 22–31)
CREAT SERPL-MCNC: 5.45 MG/DL — HIGH (ref 0.5–1.3)
GLUCOSE BLDC GLUCOMTR-MCNC: 120 MG/DL — HIGH (ref 70–99)
GLUCOSE BLDC GLUCOMTR-MCNC: 190 MG/DL — HIGH (ref 70–99)
GLUCOSE BLDC GLUCOMTR-MCNC: 218 MG/DL — HIGH (ref 70–99)
GLUCOSE SERPL-MCNC: 183 MG/DL — HIGH (ref 70–99)
HCT VFR BLD CALC: 29.7 % — LOW (ref 34.5–45)
HGB BLD-MCNC: 9.6 G/DL — LOW (ref 11.5–15.5)
MAGNESIUM SERPL-MCNC: 2.3 MG/DL — SIGNIFICANT CHANGE UP (ref 1.6–2.6)
MCHC RBC-ENTMCNC: 27.7 PG — SIGNIFICANT CHANGE UP (ref 27–34)
MCHC RBC-ENTMCNC: 32.3 GM/DL — SIGNIFICANT CHANGE UP (ref 32–36)
MCV RBC AUTO: 85.8 FL — SIGNIFICANT CHANGE UP (ref 80–100)
NRBC # BLD: 0 /100 WBCS — SIGNIFICANT CHANGE UP
NRBC # FLD: 0 K/UL — SIGNIFICANT CHANGE UP
PHOSPHATE SERPL-MCNC: 7 MG/DL — HIGH (ref 2.5–4.5)
PLATELET # BLD AUTO: 140 K/UL — LOW (ref 150–400)
POTASSIUM SERPL-MCNC: 4.5 MMOL/L — SIGNIFICANT CHANGE UP (ref 3.5–5.3)
POTASSIUM SERPL-SCNC: 4.5 MMOL/L — SIGNIFICANT CHANGE UP (ref 3.5–5.3)
RBC # BLD: 3.46 M/UL — LOW (ref 3.8–5.2)
RBC # FLD: 13.4 % — SIGNIFICANT CHANGE UP (ref 10.3–14.5)
SODIUM SERPL-SCNC: 128 MMOL/L — LOW (ref 135–145)
WBC # BLD: 5.93 K/UL — SIGNIFICANT CHANGE UP (ref 3.8–10.5)
WBC # FLD AUTO: 5.93 K/UL — SIGNIFICANT CHANGE UP (ref 3.8–10.5)

## 2021-05-22 PROCEDURE — 99239 HOSP IP/OBS DSCHRG MGMT >30: CPT

## 2021-05-22 RX ORDER — SEVELAMER CARBONATE 2400 MG/1
2 POWDER, FOR SUSPENSION ORAL
Qty: 180 | Refills: 0
Start: 2021-05-22 | End: 2021-06-20

## 2021-05-22 RX ORDER — AMLODIPINE BESYLATE 2.5 MG/1
1 TABLET ORAL
Qty: 0 | Refills: 0 | DISCHARGE

## 2021-05-22 RX ORDER — INSULIN HUMAN 100 [IU]/ML
5 INJECTION, SOLUTION SUBCUTANEOUS
Qty: 0 | Refills: 0 | DISCHARGE

## 2021-05-22 RX ORDER — INSULIN NPH HUM/REG INSULIN HM 70-30/ML
20 VIAL (ML) SUBCUTANEOUS
Qty: 1 | Refills: 0
Start: 2021-05-22 | End: 2021-06-20

## 2021-05-22 RX ORDER — HYDRALAZINE HCL 50 MG
1 TABLET ORAL
Qty: 90 | Refills: 0
Start: 2021-05-22 | End: 2021-06-20

## 2021-05-22 RX ORDER — SENNA PLUS 8.6 MG/1
2 TABLET ORAL
Qty: 0 | Refills: 0 | DISCHARGE
Start: 2021-05-22

## 2021-05-22 RX ORDER — HYDRALAZINE HCL 50 MG
1 TABLET ORAL
Qty: 0 | Refills: 0 | DISCHARGE

## 2021-05-22 RX ORDER — SEVELAMER CARBONATE 2400 MG/1
1600 POWDER, FOR SUSPENSION ORAL
Refills: 0 | Status: DISCONTINUED | OUTPATIENT
Start: 2021-05-22 | End: 2021-05-22

## 2021-05-22 RX ORDER — CARVEDILOL PHOSPHATE 80 MG/1
1 CAPSULE, EXTENDED RELEASE ORAL
Qty: 0 | Refills: 0 | DISCHARGE

## 2021-05-22 RX ORDER — HUMAN INSULIN 100 [IU]/ML
25 INJECTION, SUSPENSION SUBCUTANEOUS
Qty: 0 | Refills: 0 | DISCHARGE

## 2021-05-22 RX ORDER — BUMETANIDE 0.25 MG/ML
1 INJECTION INTRAMUSCULAR; INTRAVENOUS
Qty: 60 | Refills: 0
Start: 2021-05-22 | End: 2021-06-20

## 2021-05-22 RX ORDER — ISOSORBIDE DINITRATE 5 MG/1
1 TABLET ORAL
Qty: 90 | Refills: 0
Start: 2021-05-22 | End: 2021-06-20

## 2021-05-22 RX ORDER — METOPROLOL TARTRATE 50 MG
1 TABLET ORAL
Qty: 60 | Refills: 0
Start: 2021-05-22 | End: 2021-06-20

## 2021-05-22 RX ADMIN — GABAPENTIN 300 MILLIGRAM(S): 400 CAPSULE ORAL at 15:44

## 2021-05-22 RX ADMIN — BUMETANIDE 2 MILLIGRAM(S): 0.25 INJECTION INTRAMUSCULAR; INTRAVENOUS at 11:55

## 2021-05-22 RX ADMIN — Medication 10 UNIT(S): at 12:28

## 2021-05-22 RX ADMIN — CHLORHEXIDINE GLUCONATE 1 APPLICATION(S): 213 SOLUTION TOPICAL at 05:37

## 2021-05-22 RX ADMIN — SEVELAMER CARBONATE 1600 MILLIGRAM(S): 2400 POWDER, FOR SUSPENSION ORAL at 12:38

## 2021-05-22 RX ADMIN — Medication 10 UNIT(S): at 09:18

## 2021-05-22 RX ADMIN — Medication 4: at 12:27

## 2021-05-22 RX ADMIN — GABAPENTIN 300 MILLIGRAM(S): 400 CAPSULE ORAL at 05:37

## 2021-05-22 RX ADMIN — CHLORHEXIDINE GLUCONATE 1 APPLICATION(S): 213 SOLUTION TOPICAL at 11:55

## 2021-05-22 RX ADMIN — Medication 25 MILLIGRAM(S): at 15:45

## 2021-05-22 RX ADMIN — ISOSORBIDE DINITRATE 10 MILLIGRAM(S): 5 TABLET ORAL at 12:38

## 2021-05-22 RX ADMIN — Medication 81 MILLIGRAM(S): at 11:54

## 2021-05-22 RX ADMIN — Medication 1 TABLET(S): at 11:54

## 2021-05-22 RX ADMIN — Medication 100 MILLIGRAM(S): at 11:54

## 2021-05-22 NOTE — PROGRESS NOTE ADULT - NSICDXPILOT_GEN_ALL_CORE
[place]

## 2021-05-22 NOTE — PROGRESS NOTE ADULT - PROBLEM SELECTOR PROBLEM 1
Acute kidney injury superimposed on CKD
Acute on chronic congestive heart failure, unspecified heart failure type
Acute on chronic diastolic heart failure
Acute on chronic diastolic heart failure
Type 2 diabetes mellitus with hyperglycemia, with long-term current use of insulin
Acute kidney injury superimposed on CKD
Type 2 diabetes mellitus with hyperglycemia, with long-term current use of insulin
Type 2 diabetes mellitus with hyperglycemia, with long-term current use of insulin
Acute kidney injury superimposed on CKD
Acute on chronic congestive heart failure, unspecified heart failure type
Acute on chronic congestive heart failure, unspecified heart failure type
Acute kidney injury superimposed on CKD
Type 2 diabetes mellitus with hyperglycemia, with long-term current use of insulin
Type 2 diabetes mellitus with hyperglycemia, with long-term current use of insulin
Acute kidney injury superimposed on CKD
Shortness of breath
Shortness of breath
Acute kidney injury superimposed on CKD
Acute kidney injury superimposed on CKD
Acute on chronic diastolic heart failure
Acute kidney injury superimposed on CKD
Acute on chronic diastolic heart failure
Acute on chronic diastolic heart failure
Type 2 diabetes mellitus with hyperglycemia, with long-term current use of insulin
Acute kidney injury superimposed on CKD
Acute on chronic diastolic heart failure
Acute kidney injury superimposed on CKD
Acute on chronic congestive heart failure, unspecified heart failure type

## 2021-05-22 NOTE — PROGRESS NOTE ADULT - SUBJECTIVE AND OBJECTIVE BOX
[facility] - FRIDA Murdock / FRIDA Thompson / DALJIT Kendrick/ FRIDA Magallanes/ FRIDA Morocho/ ИВАН Escudero / HIMANSHU Raymundo / MENDOZA Alfaro  ---------------------------------------------------------------------------------------------------------------  seen and examined today for ESRD  Interval : NAD, tolerating HD well  VITALS:  T(F): 98.2 (05-22-21 @ 07:37), Max: 98.5 (05-22-21 @ 05:49)  HR: 65 (05-22-21 @ 07:37)  BP: 105/64 (05-22-21 @ 07:37)  RR: 18 (05-22-21 @ 07:37)  SpO2: 100% (05-22-21 @ 05:49)  Wt(kg): --    05-21 @ 07:01  -  05-22 @ 07:00  --------------------------------------------------------  IN: 720 mL / OUT: 0 mL / NET: 720 mL      Physical Exam :-  Constitutional: NAD  Neck: Supple.  Respiratory: Bilateral equal breath sounds,  Cardiovascular: S1, S2 normal,  Gastrointestinal: Bowel Sounds present, soft, non tender.  Extremities: 2+ edema  Neurological: Alert and Oriented x 3, no focal deficits  Psychiatric: Normal mood, normal affect  Data:-  Allergies :   No Known Allergies    Hospital Medications:   MEDICATIONS  (STANDING):  allopurinol 100 milliGRAM(s) Oral daily  aspirin enteric coated 81 milliGRAM(s) Oral daily  atorvastatin 80 milliGRAM(s) Oral at bedtime  buMETAnide 2 milliGRAM(s) Oral every 12 hours  chlorhexidine 2% Cloths 1 Application(s) Topical daily  chlorhexidine 4% Liquid 1 Application(s) Topical <User Schedule>  dextrose 40% Gel 15 Gram(s) Oral once  dextrose 5%. 1000 milliLiter(s) (50 mL/Hr) IV Continuous <Continuous>  dextrose 5%. 1000 milliLiter(s) (100 mL/Hr) IV Continuous <Continuous>  dextrose 50% Injectable 25 Gram(s) IV Push once  dextrose 50% Injectable 12.5 Gram(s) IV Push once  dextrose 50% Injectable 25 Gram(s) IV Push once  gabapentin 300 milliGRAM(s) Oral three times a day  glucagon  Injectable 1 milliGRAM(s) IntraMuscular once  hydrALAZINE 25 milliGRAM(s) Oral three times a day  insulin glargine Injectable (LANTUS) 40 Unit(s) SubCutaneous at bedtime  insulin lispro (ADMELOG) corrective regimen sliding scale   SubCutaneous three times a day before meals  insulin lispro (ADMELOG) corrective regimen sliding scale   SubCutaneous at bedtime  insulin lispro Injectable (ADMELOG) 10 Unit(s) SubCutaneous three times a day before meals  isosorbide   dinitrate Tablet (ISORDIL) 10 milliGRAM(s) Oral three times a day  metoprolol succinate  milliGRAM(s) Oral two times a day  multivitamin 1 Tablet(s) Oral daily  senna 2 Tablet(s) Oral at bedtime    05-22    128<L>  |  89<L>  |  74<H>  ----------------------------<  183<H>  4.5   |  24  |  5.45<H>    Ca    10.2      22 May 2021 08:28  Phos  7.0     05-22  Mg     2.3     05-22      Creatinine Trend: 5.45 <--, 3.99 <--, 4.54 <--, 4.02 <--, 3.50 <--, 4.72 <--, 3.75 <--                        9.6    5.93  )-----------( 140      ( 22 May 2021 08:28 )             29.7

## 2021-05-22 NOTE — CHART NOTE - NSCHARTNOTEFT_GEN_A_CORE
pt seen and examined by me  had HD this AM  planned for dc  to start outpt HD 5/25, arranged by HD PONCHO  a/w RADHA on CKD now with ESRD req HD  medically stable for dc  32 min spent with dc planning

## 2021-05-22 NOTE — PROGRESS NOTE ADULT - PROVIDER SPECIALTY LIST ADULT
Endocrinology
Heart Failure
Nephrology
Endocrinology
Heart Failure
Heart Failure
Intervent Radiology
Nephrology
Heart Failure
Heart Failure
Nephrology
Endocrinology
Heart Failure
Hospitalist
Nephrology
Heart Failure
Hospitalist
Endocrinology
Nephrology
Heart Failure
Heart Failure
Nephrology
Nephrology
Hospitalist
Nephrology
Nephrology
Endocrinology
Hospitalist
Hospitalist
Endocrinology
Hospitalist

## 2021-05-22 NOTE — PROGRESS NOTE ADULT - ASSESSMENT
71F PMH of DM2 with neuropathy, HTN, CKD, PAD s/p angiogram RLE, HLD, and Thyroid nodules (benign biopsy x2 as per patient) who presents to the hospital with complaints of worsening SOB, LE edema, orthopnea, weight gain, and increasing abdominal girth likely 2/2 CHF exacerbation. Started on bumex gtt. Renal following for JAVED on CKD.       labs, chart reviewed    Javed on CKD 3  Pt with CKD3, follows with Dr Escudero in outpt clinic.   Baseline Cr ~2 from earlier this year  Worsening renal failure, requiring HD start.   shiley cath changed to tunneled cath 5/20  Toelrating HD well today  Euvolemic, as per HF notes.   SW note reviewed-pt accepted to [facility] unit- scheduled for Tuesday, Thursday  and Saturday at 10:30am. first appointment set up for Tuesday 5/25  pt may be d/c home after HD today  dose all meds for eGFR<15ml/min.   avoid ACEi/ARB/NSAIDs/Nephrotoxics.

## 2021-05-25 PROBLEM — G62.9 POLYNEUROPATHY, UNSPECIFIED: Chronic | Status: ACTIVE | Noted: 2021-04-29

## 2021-05-25 PROBLEM — E11.9 TYPE 2 DIABETES MELLITUS WITHOUT COMPLICATIONS: Chronic | Status: ACTIVE | Noted: 2021-04-29

## 2021-05-25 PROBLEM — I10 ESSENTIAL (PRIMARY) HYPERTENSION: Chronic | Status: ACTIVE | Noted: 2021-04-29

## 2021-05-25 PROBLEM — I73.9 PERIPHERAL VASCULAR DISEASE, UNSPECIFIED: Chronic | Status: ACTIVE | Noted: 2021-04-29

## 2021-05-25 PROBLEM — N18.9 CHRONIC KIDNEY DISEASE, UNSPECIFIED: Chronic | Status: ACTIVE | Noted: 2021-04-29

## 2021-05-25 PROBLEM — I50.9 HEART FAILURE, UNSPECIFIED: Chronic | Status: ACTIVE | Noted: 2021-04-29

## 2021-05-25 PROBLEM — E78.5 HYPERLIPIDEMIA, UNSPECIFIED: Chronic | Status: ACTIVE | Noted: 2021-04-29

## 2021-06-04 ENCOUNTER — APPOINTMENT (OUTPATIENT)
Dept: VASCULAR SURGERY | Facility: CLINIC | Age: 71
End: 2021-06-04
Payer: MEDICARE

## 2021-06-04 VITALS — HEART RATE: 78 BPM | SYSTOLIC BLOOD PRESSURE: 114 MMHG | DIASTOLIC BLOOD PRESSURE: 68 MMHG

## 2021-06-04 VITALS — TEMPERATURE: 97 F

## 2021-06-04 PROCEDURE — 99214 OFFICE O/P EST MOD 30 MIN: CPT

## 2021-06-04 PROCEDURE — 93986 DUP-SCAN HEMO COMPL UNI STD: CPT

## 2021-06-04 NOTE — ASSESSMENT
[FreeTextEntry1] : Patient with renal failure on dialysis.  Patient currently being dialyzed through a right-sided tunneled catheter.  Plan for left radiocephalic fistula.  Patient needs medical clearance.

## 2021-06-30 ENCOUNTER — OUTPATIENT (OUTPATIENT)
Dept: OUTPATIENT SERVICES | Facility: HOSPITAL | Age: 71
LOS: 1 days | End: 2021-06-30
Payer: COMMERCIAL

## 2021-06-30 VITALS
WEIGHT: 210.1 LBS | HEART RATE: 69 BPM | RESPIRATION RATE: 17 BRPM | OXYGEN SATURATION: 98 % | HEIGHT: 62 IN | TEMPERATURE: 97 F

## 2021-06-30 DIAGNOSIS — N18.6 END STAGE RENAL DISEASE: ICD-10-CM

## 2021-06-30 DIAGNOSIS — Z90.710 ACQUIRED ABSENCE OF BOTH CERVIX AND UTERUS: Chronic | ICD-10-CM

## 2021-06-30 DIAGNOSIS — E11.9 TYPE 2 DIABETES MELLITUS WITHOUT COMPLICATIONS: ICD-10-CM

## 2021-06-30 DIAGNOSIS — Z98.49 CATARACT EXTRACTION STATUS, UNSPECIFIED EYE: Chronic | ICD-10-CM

## 2021-06-30 DIAGNOSIS — Z98.62 PERIPHERAL VASCULAR ANGIOPLASTY STATUS: Chronic | ICD-10-CM

## 2021-06-30 DIAGNOSIS — I10 ESSENTIAL (PRIMARY) HYPERTENSION: ICD-10-CM

## 2021-06-30 DIAGNOSIS — Z98.890 OTHER SPECIFIED POSTPROCEDURAL STATES: Chronic | ICD-10-CM

## 2021-06-30 DIAGNOSIS — Z29.9 ENCOUNTER FOR PROPHYLACTIC MEASURES, UNSPECIFIED: ICD-10-CM

## 2021-06-30 DIAGNOSIS — Z01.818 ENCOUNTER FOR OTHER PREPROCEDURAL EXAMINATION: ICD-10-CM

## 2021-06-30 LAB
MRSA PCR RESULT.: SIGNIFICANT CHANGE UP
S AUREUS DNA NOSE QL NAA+PROBE: SIGNIFICANT CHANGE UP

## 2021-06-30 PROCEDURE — 87640 STAPH A DNA AMP PROBE: CPT

## 2021-06-30 PROCEDURE — 87641 MR-STAPH DNA AMP PROBE: CPT

## 2021-06-30 PROCEDURE — G0463: CPT

## 2021-06-30 RX ORDER — OLOPATADINE HYDROCHLORIDE 1 MG/ML
1 SOLUTION/ DROPS OPHTHALMIC
Qty: 0 | Refills: 0 | DISCHARGE

## 2021-06-30 NOTE — H&P PST ADULT - NSICDXPROBLEM_GEN_ALL_CORE_FT
PROBLEM DIAGNOSES  Problem: Need for prophylactic measure  Assessment and Plan: pt to wash with chlorhexidine am of surgery    Problem: HTN (hypertension)  Assessment and Plan: Pt instructed to take bp meds am of surgery but monitor first    Problem: DM (diabetes mellitus)  Assessment and Plan: to monitor finger stick am of surgery    Problem: ESRD on dialysis  Assessment and Plan: schedule for left av fistula creation

## 2021-06-30 NOTE — H&P PST ADULT - NSICDXPASTMEDICALHX_GEN_ALL_CORE_FT
PAST MEDICAL HISTORY:  CHF (congestive heart failure)     CKD (chronic kidney disease)     Diabetes     ESRD on dialysis     HLD (hyperlipidemia)     HTN (hypertension)     Peripheral neuropathy     PVD (peripheral vascular disease)

## 2021-06-30 NOTE — H&P PST ADULT - HISTORY OF PRESENT ILLNESS
71 yr old Black female with multiple medical problems: pt recently admitted to [facility] for SOB and fluid accumulation pt treated you with Lasix and noted the kidney function levels were elevated and started dialysis day of admission April 2021. Pt has Continue antidiabetic meds and hold on day of surgery. Perioperative glucose monitoring and cover as needed. Follow-up with PCP for diabetic management HTN and is schedule for left radiocephalic av fistula creation on 7/8/2021. Pt instructed on using the chlorhexidine wash day of surgery.

## 2021-06-30 NOTE — H&P PST ADULT - ASSESSMENT
71 yr old Black female with history HTN diabetic (insulin) PVD CHF presents for a permanent access for dialysis . Pt was recently diagnosed with ESRD presently on dialysis via right perma cath. Pt schedule for left AV fistula creation on 7/8/2021.

## 2021-06-30 NOTE — H&P PST ADULT - NSANTHOSAYNRD_GEN_A_CORE
No. PURVI screening performed.  STOP BANG Legend: 0-2 = LOW Risk; 3-4 = INTERMEDIATE Risk; 5-8 = HIGH Risk

## 2021-07-01 DIAGNOSIS — Z01.818 ENCOUNTER FOR OTHER PREPROCEDURAL EXAMINATION: ICD-10-CM

## 2021-07-06 ENCOUNTER — APPOINTMENT (OUTPATIENT)
Dept: DISASTER EMERGENCY | Facility: CLINIC | Age: 71
End: 2021-07-06

## 2021-07-07 ENCOUNTER — TRANSCRIPTION ENCOUNTER (OUTPATIENT)
Age: 71
End: 2021-07-07

## 2021-07-07 LAB — SARS-COV-2 N GENE NPH QL NAA+PROBE: NOT DETECTED

## 2021-07-08 ENCOUNTER — APPOINTMENT (OUTPATIENT)
Dept: VASCULAR SURGERY | Facility: HOSPITAL | Age: 71
End: 2021-07-08

## 2021-07-08 ENCOUNTER — OUTPATIENT (OUTPATIENT)
Dept: OUTPATIENT SERVICES | Facility: HOSPITAL | Age: 71
LOS: 1 days | End: 2021-07-08
Payer: COMMERCIAL

## 2021-07-08 VITALS
HEART RATE: 65 BPM | SYSTOLIC BLOOD PRESSURE: 96 MMHG | RESPIRATION RATE: 16 BRPM | DIASTOLIC BLOOD PRESSURE: 64 MMHG | TEMPERATURE: 98 F | OXYGEN SATURATION: 95 %

## 2021-07-08 VITALS
RESPIRATION RATE: 16 BRPM | SYSTOLIC BLOOD PRESSURE: 99 MMHG | DIASTOLIC BLOOD PRESSURE: 55 MMHG | TEMPERATURE: 98 F | HEART RATE: 70 BPM | HEIGHT: 62 IN | OXYGEN SATURATION: 100 % | WEIGHT: 210.1 LBS

## 2021-07-08 DIAGNOSIS — Z98.890 OTHER SPECIFIED POSTPROCEDURAL STATES: Chronic | ICD-10-CM

## 2021-07-08 DIAGNOSIS — Z98.62 PERIPHERAL VASCULAR ANGIOPLASTY STATUS: Chronic | ICD-10-CM

## 2021-07-08 DIAGNOSIS — Z98.49 CATARACT EXTRACTION STATUS, UNSPECIFIED EYE: Chronic | ICD-10-CM

## 2021-07-08 DIAGNOSIS — N18.6 END STAGE RENAL DISEASE: ICD-10-CM

## 2021-07-08 DIAGNOSIS — Z01.818 ENCOUNTER FOR OTHER PREPROCEDURAL EXAMINATION: ICD-10-CM

## 2021-07-08 DIAGNOSIS — Z90.710 ACQUIRED ABSENCE OF BOTH CERVIX AND UTERUS: Chronic | ICD-10-CM

## 2021-07-08 LAB
ANION GAP SERPL CALC-SCNC: 13 MMOL/L — SIGNIFICANT CHANGE UP (ref 5–17)
BUN SERPL-MCNC: 42 MG/DL — HIGH (ref 7–18)
CALCIUM SERPL-MCNC: 10.4 MG/DL — SIGNIFICANT CHANGE UP (ref 8.4–10.5)
CHLORIDE SERPL-SCNC: 95 MMOL/L — LOW (ref 96–108)
CO2 SERPL-SCNC: 31 MMOL/L — SIGNIFICANT CHANGE UP (ref 22–31)
CREAT SERPL-MCNC: 8.16 MG/DL — HIGH (ref 0.5–1.3)
GLUCOSE BLDC GLUCOMTR-MCNC: 124 MG/DL — HIGH (ref 70–99)
GLUCOSE BLDC GLUCOMTR-MCNC: 99 MG/DL — SIGNIFICANT CHANGE UP (ref 70–99)
GLUCOSE SERPL-MCNC: 101 MG/DL — HIGH (ref 70–99)
POTASSIUM SERPL-MCNC: 5.1 MMOL/L — SIGNIFICANT CHANGE UP (ref 3.5–5.3)
POTASSIUM SERPL-SCNC: 5.1 MMOL/L — SIGNIFICANT CHANGE UP (ref 3.5–5.3)
SODIUM SERPL-SCNC: 139 MMOL/L — SIGNIFICANT CHANGE UP (ref 135–145)

## 2021-07-08 PROCEDURE — C1889: CPT

## 2021-07-08 PROCEDURE — 82962 GLUCOSE BLOOD TEST: CPT

## 2021-07-08 PROCEDURE — 36415 COLL VENOUS BLD VENIPUNCTURE: CPT

## 2021-07-08 PROCEDURE — 36820 AV FUSION/FOREARM VEIN: CPT | Mod: AS,LT

## 2021-07-08 PROCEDURE — 80048 BASIC METABOLIC PNL TOTAL CA: CPT

## 2021-07-08 PROCEDURE — 36820 AV FUSION/FOREARM VEIN: CPT

## 2021-07-08 RX ORDER — ALLOPURINOL 300 MG
1 TABLET ORAL
Qty: 0 | Refills: 0 | DISCHARGE

## 2021-07-08 RX ORDER — SODIUM CHLORIDE 9 MG/ML
3 INJECTION INTRAMUSCULAR; INTRAVENOUS; SUBCUTANEOUS EVERY 8 HOURS
Refills: 0 | Status: DISCONTINUED | OUTPATIENT
Start: 2021-07-08 | End: 2021-07-08

## 2021-07-08 RX ORDER — ONDANSETRON 8 MG/1
4 TABLET, FILM COATED ORAL ONCE
Refills: 0 | Status: DISCONTINUED | OUTPATIENT
Start: 2021-07-08 | End: 2021-07-08

## 2021-07-08 RX ORDER — FENTANYL CITRATE 50 UG/ML
25 INJECTION INTRAVENOUS
Refills: 0 | Status: DISCONTINUED | OUTPATIENT
Start: 2021-07-08 | End: 2021-07-08

## 2021-07-08 RX ORDER — ATORVASTATIN CALCIUM 80 MG/1
1 TABLET, FILM COATED ORAL
Qty: 0 | Refills: 0 | DISCHARGE

## 2021-07-08 RX ORDER — CHLORHEXIDINE GLUCONATE 213 G/1000ML
1 SOLUTION TOPICAL DAILY
Refills: 0 | Status: DISCONTINUED | OUTPATIENT
Start: 2021-07-08 | End: 2021-07-08

## 2021-07-08 RX ORDER — FENTANYL CITRATE 50 UG/ML
50 INJECTION INTRAVENOUS
Refills: 0 | Status: DISCONTINUED | OUTPATIENT
Start: 2021-07-08 | End: 2021-07-08

## 2021-07-08 RX ORDER — SODIUM CHLORIDE 9 MG/ML
1000 INJECTION, SOLUTION INTRAVENOUS
Refills: 0 | Status: DISCONTINUED | OUTPATIENT
Start: 2021-07-08 | End: 2021-07-08

## 2021-07-08 RX ORDER — GABAPENTIN 400 MG/1
1 CAPSULE ORAL
Qty: 0 | Refills: 0 | DISCHARGE

## 2021-07-08 RX ORDER — ASPIRIN/CALCIUM CARB/MAGNESIUM 324 MG
1 TABLET ORAL
Qty: 0 | Refills: 0 | DISCHARGE

## 2021-07-08 NOTE — ASU PATIENT PROFILE, ADULT - PMH
CHF (congestive heart failure)    CKD (chronic kidney disease)    Diabetes    ESRD on dialysis    HLD (hyperlipidemia)    HTN (hypertension)    Peripheral neuropathy    PVD (peripheral vascular disease)

## 2021-07-08 NOTE — ASU DISCHARGE PLAN (ADULT/PEDIATRIC) - ASU DC SPECIAL INSTRUCTIONSFT
left upper extremity precautions  no iv, blood draw or blood pressure cuff on the left upper extremity

## 2021-07-08 NOTE — ASU PATIENT PROFILE, ADULT - PSH
History of angioplasty of peripheral vessel  RLE - no stents  History of breast biopsy  bilateral - benign  History of cataract surgery  bilateral eyes  History of hysterectomy    S/P thyroid biopsy  benign

## 2021-07-30 ENCOUNTER — APPOINTMENT (OUTPATIENT)
Dept: VASCULAR SURGERY | Facility: CLINIC | Age: 71
End: 2021-07-30

## 2021-08-03 ENCOUNTER — APPOINTMENT (OUTPATIENT)
Dept: VASCULAR SURGERY | Facility: CLINIC | Age: 71
End: 2021-08-03
Payer: MEDICARE

## 2021-08-03 ENCOUNTER — RESULT REVIEW (OUTPATIENT)
Age: 71
End: 2021-08-03

## 2021-08-03 ENCOUNTER — APPOINTMENT (OUTPATIENT)
Dept: ENDOVASCULAR SURGERY | Facility: CLINIC | Age: 71
End: 2021-08-03
Payer: MEDICARE

## 2021-08-03 VITALS
TEMPERATURE: 98.1 F | HEIGHT: 62 IN | SYSTOLIC BLOOD PRESSURE: 162 MMHG | DIASTOLIC BLOOD PRESSURE: 79 MMHG | BODY MASS INDEX: 40.48 KG/M2 | OXYGEN SATURATION: 100 % | WEIGHT: 220 LBS | RESPIRATION RATE: 20 BRPM | HEART RATE: 76 BPM

## 2021-08-03 PROBLEM — N18.6 END STAGE RENAL DISEASE: Chronic | Status: ACTIVE | Noted: 2021-06-30

## 2021-08-03 PROCEDURE — 93923 UPR/LXTR ART STDY 3+ LVLS: CPT

## 2021-08-03 PROCEDURE — 36902Z: CUSTOM | Mod: 58

## 2021-08-03 PROCEDURE — 76937 US GUIDE VASCULAR ACCESS: CPT

## 2021-08-03 PROCEDURE — 93926 LOWER EXTREMITY STUDY: CPT

## 2021-08-03 PROCEDURE — 36215Z: CUSTOM | Mod: 59,58

## 2021-08-09 PROBLEM — N18.9 CHRONIC KIDNEY DISEASE, UNSPECIFIED CKD STAGE: Status: ACTIVE | Noted: 2020-11-20

## 2021-08-09 PROBLEM — T82.898A INADEQUATE FLOW OF DIALYSIS ARTERIOVENOUS FISTULA: Status: ACTIVE | Noted: 2021-08-09

## 2021-08-10 ENCOUNTER — NON-APPOINTMENT (OUTPATIENT)
Age: 71
End: 2021-08-10

## 2021-08-10 ENCOUNTER — RESULT REVIEW (OUTPATIENT)
Age: 71
End: 2021-08-10

## 2021-08-10 ENCOUNTER — APPOINTMENT (OUTPATIENT)
Dept: ENDOVASCULAR SURGERY | Facility: CLINIC | Age: 71
End: 2021-08-10
Payer: MEDICARE

## 2021-08-10 VITALS
HEIGHT: 62 IN | OXYGEN SATURATION: 95 % | WEIGHT: 220 LBS | DIASTOLIC BLOOD PRESSURE: 80 MMHG | BODY MASS INDEX: 40.48 KG/M2 | TEMPERATURE: 97.7 F | HEART RATE: 63 BPM | SYSTOLIC BLOOD PRESSURE: 128 MMHG | RESPIRATION RATE: 20 BRPM

## 2021-08-10 DIAGNOSIS — N18.9 CHRONIC KIDNEY DISEASE, UNSPECIFIED: ICD-10-CM

## 2021-08-10 DIAGNOSIS — T82.898A OTHER SPECIFIED COMPLICATION OF VASCULAR PROSTHETIC DEVICES, IMPLANTS AND GRAFTS, INITIAL ENCOUNTER: ICD-10-CM

## 2021-08-10 PROCEDURE — 36902Z: CUSTOM | Mod: 58

## 2021-08-10 PROCEDURE — 76937 US GUIDE VASCULAR ACCESS: CPT

## 2021-08-10 RX ORDER — FUROSEMIDE 40 MG/1
40 TABLET ORAL
Refills: 0 | Status: DISCONTINUED | COMMUNITY
End: 2021-08-10

## 2021-08-10 RX ORDER — ISOSORBIDE DINITRATE 10 MG/1
10 TABLET ORAL
Refills: 0 | Status: ACTIVE | COMMUNITY

## 2021-08-10 RX ORDER — ALLOPURINOL 100 MG/1
100 TABLET ORAL
Refills: 0 | Status: ACTIVE | COMMUNITY

## 2021-08-10 RX ORDER — SEVELAMER CARBONATE 800 MG/1
800 TABLET, FILM COATED ORAL
Refills: 0 | Status: ACTIVE | COMMUNITY

## 2021-08-10 RX ORDER — HYDRALAZINE HYDROCHLORIDE 10 MG/1
10 TABLET ORAL
Refills: 0 | Status: ACTIVE | COMMUNITY

## 2021-08-10 RX ORDER — CLOPIDOGREL BISULFATE 75 MG/1
75 TABLET, FILM COATED ORAL DAILY
Qty: 90 | Refills: 3 | Status: DISCONTINUED | COMMUNITY
Start: 2020-12-16 | End: 2021-08-10

## 2021-08-10 RX ORDER — METOPROLOL TARTRATE 50 MG/1
50 TABLET, FILM COATED ORAL
Refills: 0 | Status: ACTIVE | COMMUNITY

## 2021-08-10 RX ORDER — BUMETANIDE 2 MG/1
2 TABLET ORAL
Refills: 0 | Status: ACTIVE | COMMUNITY

## 2021-08-23 NOTE — PROGRESS NOTE ADULT - SUBJECTIVE AND OBJECTIVE BOX
Bulb Lynn-Pharynx Suction with additional procedures [facility] Division of Hospital Medicine  Chad BROWN ([place]) MD Michael  Pager [phone]    SUBJECTIVE:  Follow up for ________.      ROS: All systems negative except as noted.      Vital Signs Last 24 Hrs  T(C): 36.8 (12 May 2021 11:20), Max: 36.9 (12 May 2021 02:12)  T(F): 98.2 (12 May 2021 11:20), Max: 98.5 (12 May 2021 02:12)  HR: 66 (12 May 2021 11:20) (64 - 68)  BP: 146/74 (12 May 2021 11:20) (123/56 - 146/74)  BP(mean): --  RR: 17 (12 May 2021 11:20) (16 - 18)  SpO2: 95% (12 May 2021 11:20) (94% - 99%)      PHYSICAL EXAM:  Gen- In bed, comfortable, NAD  Eyes- EOMI, PERRLA, nonicteric.  EMNT- Fair dentition. MMM. No tonsilar exudates. No posterior pharynx erythema.  Neck- Supple. No masses. No tracheal deviation.  Resp- CTAB, good effort. No r/r/w. No accessory muscle use.  CVS- RRR, S1S2, no g/r/m. Trace LE edema.  GI- Soft abd, NT, ND, +BSx4. No HSM.  MSK- No C/C. ROM intact. No crepitus.  Neuro- CN II-XII intact. Speech fluent/face symmetric. Sensation intact.  Skin- No rashes/ulcers. Warm/moist.  Psych- AAOx3. Appropriate mood/affect.        MEDICATION:  MEDICATIONS  (STANDING):  allopurinol 100 milliGRAM(s) Oral daily  aspirin enteric coated 81 milliGRAM(s) Oral daily  atorvastatin 80 milliGRAM(s) Oral at bedtime  buMETAnide Injectable 2 milliGRAM(s) IV Push two times a day  dextrose 40% Gel 15 Gram(s) Oral once  dextrose 5%. 1000 milliLiter(s) (50 mL/Hr) IV Continuous <Continuous>  dextrose 5%. 1000 milliLiter(s) (100 mL/Hr) IV Continuous <Continuous>  dextrose 50% Injectable 25 Gram(s) IV Push once  dextrose 50% Injectable 12.5 Gram(s) IV Push once  dextrose 50% Injectable 25 Gram(s) IV Push once  gabapentin 300 milliGRAM(s) Oral three times a day  glucagon  Injectable 1 milliGRAM(s) IntraMuscular once  heparin   Injectable 5000 Unit(s) SubCutaneous every 8 hours  hydrALAZINE 50 milliGRAM(s) Oral three times a day  insulin glargine Injectable (LANTUS) 55 Unit(s) SubCutaneous at bedtime  insulin lispro (ADMELOG) corrective regimen sliding scale   SubCutaneous three times a day before meals  insulin lispro (ADMELOG) corrective regimen sliding scale   SubCutaneous at bedtime  insulin lispro Injectable (ADMELOG) 20 Unit(s) SubCutaneous three times a day before meals  isosorbide   dinitrate Tablet (ISORDIL) 10 milliGRAM(s) Oral three times a day  metoprolol succinate  milliGRAM(s) Oral two times a day  multivitamin 1 Tablet(s) Oral daily    MEDICATIONS  (PRN):  acetaminophen   Tablet .. 650 milliGRAM(s) Oral every 6 hours PRN Temp greater or equal to 38C (100.4F), Mild Pain (1 - 3), Moderate Pain (4 - 6)  ketotifen 0.025% Ophthalmic Solution 1 Drop(s) Both EYES two times a day PRN Allergic Conjunctivitis            LABORATORY:                          9.8    5.45  )-----------( 178      ( 12 May 2021 08:37 )             30.2     05-12    134<L>  |  91<L>  |  160<H>  ----------------------------<  132<H>  4.4   |  29  |  4.94<H>    Ca    10.3      12 May 2021 08:37  Phos  4.9     05-12  Mg     2.8     05-12                COVID-19 PCR: Cedric (29 Apr 2021 10:24)               [facility] Division of Hospital Medicine  Chad WilsonElías) MD Michael  Pager [phone]    SUBJECTIVE:  Follow up for CHF.    Pt seen and evaluated at bedside this AM. No o/n events. Denies any SOb/Cp/NV. Feels well. No complaints.      ROS: All systems negative except as noted.      Vital Signs Last 24 Hrs  T(C): 36.8 (12 May 2021 11:20), Max: 36.9 (12 May 2021 02:12)  T(F): 98.2 (12 May 2021 11:20), Max: 98.5 (12 May 2021 02:12)  HR: 66 (12 May 2021 11:20) (64 - 68)  BP: 146/74 (12 May 2021 11:20) (123/56 - 146/74)  BP(mean): --  RR: 17 (12 May 2021 11:20) (16 - 18)  SpO2: 95% (12 May 2021 11:20) (94% - 99%)      PHYSICAL EXAM:  Gen- In bed, comfortable, NAD  Eyes- EOMI, PERRLA, nonicteric.  EMNT- Fair dentition. MMM. No tonsilar exudates. No posterior pharynx erythema.  Neck- Supple. No masses. No tracheal deviation.  Resp- CTAB, good effort. No r/r/w. No accessory muscle use.  CVS- RRR, S1S2, no g/r/m. Trace LE edema.  GI- Soft abd, NT, ND, +BSx4. No HSM.  MSK- No C/C. ROM intact. No crepitus.  Neuro- CN II-XII intact. Speech fluent/face symmetric. Sensation intact.  Skin- No rashes/ulcers. Warm/moist.  Psych- AAOx3. Appropriate mood/affect.        MEDICATION:  MEDICATIONS  (STANDING):  allopurinol 100 milliGRAM(s) Oral daily  aspirin enteric coated 81 milliGRAM(s) Oral daily  atorvastatin 80 milliGRAM(s) Oral at bedtime  buMETAnide Injectable 2 milliGRAM(s) IV Push two times a day  dextrose 40% Gel 15 Gram(s) Oral once  dextrose 5%. 1000 milliLiter(s) (50 mL/Hr) IV Continuous <Continuous>  dextrose 5%. 1000 milliLiter(s) (100 mL/Hr) IV Continuous <Continuous>  dextrose 50% Injectable 25 Gram(s) IV Push once  dextrose 50% Injectable 12.5 Gram(s) IV Push once  dextrose 50% Injectable 25 Gram(s) IV Push once  gabapentin 300 milliGRAM(s) Oral three times a day  glucagon  Injectable 1 milliGRAM(s) IntraMuscular once  heparin   Injectable 5000 Unit(s) SubCutaneous every 8 hours  hydrALAZINE 50 milliGRAM(s) Oral three times a day  insulin glargine Injectable (LANTUS) 55 Unit(s) SubCutaneous at bedtime  insulin lispro (ADMELOG) corrective regimen sliding scale   SubCutaneous three times a day before meals  insulin lispro (ADMELOG) corrective regimen sliding scale   SubCutaneous at bedtime  insulin lispro Injectable (ADMELOG) 20 Unit(s) SubCutaneous three times a day before meals  isosorbide   dinitrate Tablet (ISORDIL) 10 milliGRAM(s) Oral three times a day  metoprolol succinate  milliGRAM(s) Oral two times a day  multivitamin 1 Tablet(s) Oral daily    MEDICATIONS  (PRN):  acetaminophen   Tablet .. 650 milliGRAM(s) Oral every 6 hours PRN Temp greater or equal to 38C (100.4F), Mild Pain (1 - 3), Moderate Pain (4 - 6)  ketotifen 0.025% Ophthalmic Solution 1 Drop(s) Both EYES two times a day PRN Allergic Conjunctivitis            LABORATORY:                          9.8    5.45  )-----------( 178      ( 12 May 2021 08:37 )             30.2     05-12    134<L>  |  91<L>  |  160<H>  ----------------------------<  132<H>  4.4   |  29  |  4.94<H>    Ca    10.3      12 May 2021 08:37  Phos  4.9     05-12  Mg     2.8     05-12                COVID-19 PCR: Jorgetec (29 Apr 2021 10:24)

## 2021-08-24 ENCOUNTER — APPOINTMENT (OUTPATIENT)
Dept: ENDOVASCULAR SURGERY | Facility: CLINIC | Age: 71
End: 2021-08-24

## 2021-09-01 NOTE — HISTORY OF PRESENT ILLNESS
[] : left radiocephalic fistula [FreeTextEntry1] : Dr. Kidd 7/08/21  [FreeTextEntry4] : Yesterday  [FreeTextEntry5] : Yesterday 930pm  [FreeTextEntry6] :

## 2021-09-01 NOTE — PROCEDURE
[Resume diet] : resume diet [Site check for bleeding/hematoma] : Site check for bleeding/hematoma [Vital signs on admission the q 15 mins x2] : Vital signs on admission the q 15 mins x2 [FreeTextEntry1] : Left arm fistula/fistulogram/angioplasty/coil embolization

## 2021-09-01 NOTE — PAST MEDICAL HISTORY
[FreeTextEntry1] : Malignant Hyperthermia Screening Tool and Risk of Bleeding Assessment\par Ms. YON WARREN denies family history of unexpected death following Anesthesia or Exercise.\par Denies Family history of Malignant Hyperthermia, Muscle or Neuromuscular disorder and High Temperature following exercise.\par \par Ms. YON WARREN denies history of Muscle Spasm, Dark or Chocolate - Colored urine and Unanticipated fever immediately following anesthesia or serious exercise. \par Ms. WARREN also denies bleeding tendencies/ Risks of Bleeding.

## 2021-09-01 NOTE — HISTORY OF PRESENT ILLNESS
[] : right internal jugular tunneled catheter [FreeTextEntry1] : Dr. Kidd 7/08/21  [FreeTextEntry4] : Yesterday  [FreeTextEntry5] : Yesterday 7pm [FreeTextEntry6] : Dr. Sparks

## 2021-09-01 NOTE — PAST MEDICAL HISTORY
[Increasing age ( >40 years old)] : Increasing age ( >40 years old) [No therapy indicated for cases scheduled for less than one hour] : No therapy indicated for cases scheduled for less than one hour. [FreeTextEntry1] : Malignant Hyperthermia Screening Tool and Risk of Bleeding Assessment\par Ms. YON WARREN denies family history of unexpected death following Anesthesia or Exercise.\par Denies Family history of Malignant Hyperthermia, Muscle or Neuromuscular disorder and High Temperature following exercise.\par \par Ms. YON WARREN denies history of Muscle Spasm, Dark or Chocolate - Colored urine and Unanticipated fever immediately following anesthesia or serious exercise. \par Ms. WARREN also denies bleeding tendencies/ Risks of Bleeding.

## 2021-09-01 NOTE — ASSESSMENT
[FreeTextEntry1] : Patient with renal failure on dialysis.  Patient currently being dialyzed through a right-sided tunneled catheter.  Plan for left fistulagram

## 2022-06-28 NOTE — H&P PST ADULT - PSYCHIATRIC
Called patient and LVM with the appt changed to tomorrow at 2:20 p m  for consult appt  detailed exam negative

## 2023-07-23 NOTE — DIETITIAN INITIAL EVALUATION ADULT. - ETIOLOGY
Addended by: PEARL VARGAS on: 7/23/2023 02:55 PM     Modules accepted: Level of Service     limited prior nutrition education

## 2023-08-12 ENCOUNTER — NON-APPOINTMENT (OUTPATIENT)
Age: 73
End: 2023-08-12

## 2024-07-08 NOTE — H&P ADULT - PROBLEM SELECTOR PROBLEM 8
GENERAL PRE-PROCEDURE:   Procedure:  Left lower extremity angiogram with sedation and possible intervention  Date/Time:  7/8/2024 9:25 AM    Verbal consent obtained?: Yes    Written consent obtained?: Yes    Risks and benefits: Risks, benefits and alternatives were discussed    Consent given by:  Patient  Patient states understanding of procedure being performed: Yes    Patient's understanding of procedure matches consent: Yes    Procedure consent matches procedure scheduled: Yes    Expected level of sedation:  Moderate  Appropriately NPO:  Yes  ASA Class:  2  Mallampati  :  Grade 2- soft palate, base of uvula, tonsillar pillars, and portion of posterior pharyngeal wall visible  Lungs:  Lungs clear with good breath sounds bilaterally  Heart:  Normal heart sounds and rate  History & Physical reviewed:  History and physical reviewed and no updates needed  Statement of review:  I have reviewed the lab findings, diagnostic data, medications, and the plan for sedation    
HTN (hypertension)

## 2024-10-17 NOTE — H&P PST ADULT - VENOUS THROMBOEMBOLISM SCORE
3 30 day Outreach call to patient.  No answer, no VM set up, unable to leave message.    Is This A New Presentation, Or A Follow-Up?: Skin Lesions What Type Of Note Output Would You Prefer (Optional)?: Bullet Format How Severe Is Your Skin Lesion?: mild Has Your Skin Lesion Been Treated?: not been treated

## 2025-03-21 NOTE — PROGRESS NOTE ADULT - PROBLEM SELECTOR PLAN 4
